# Patient Record
Sex: FEMALE | Race: WHITE | NOT HISPANIC OR LATINO | Employment: FULL TIME | URBAN - METROPOLITAN AREA
[De-identification: names, ages, dates, MRNs, and addresses within clinical notes are randomized per-mention and may not be internally consistent; named-entity substitution may affect disease eponyms.]

---

## 2017-04-12 ENCOUNTER — TELEPHONE (OUTPATIENT)
Dept: CARDIOLOGY | Facility: CLINIC | Age: 62
End: 2017-04-12

## 2017-04-12 DIAGNOSIS — R73.01 IMPAIRED FASTING GLUCOSE: Primary | ICD-10-CM

## 2017-04-12 DIAGNOSIS — E78.5 HYPERLIPIDEMIA, UNSPECIFIED HYPERLIPIDEMIA TYPE: ICD-10-CM

## 2017-04-26 ENCOUNTER — TELEPHONE (OUTPATIENT)
Dept: CARDIOLOGY | Facility: CLINIC | Age: 62
End: 2017-04-26

## 2017-04-26 NOTE — TELEPHONE ENCOUNTER
----- Message from Dianna Maynard sent at 4/26/2017 11:46 AM CDT -----  Contact: Pt  [phone] this is the wife work number  Della this pt  called and ask that you give them a call in ref to their appt.    Thanks

## 2017-05-23 ENCOUNTER — TELEPHONE (OUTPATIENT)
Dept: CARDIOLOGY | Facility: CLINIC | Age: 62
End: 2017-05-23

## 2017-05-24 NOTE — TELEPHONE ENCOUNTER
----- Message from Dianna Maynard sent at 5/23/2017  9:32 AM CDT -----  Contact: pt [phone]  Della this pt would like a call in ref to some labs that are ordered.    Thanks

## 2017-05-24 NOTE — TELEPHONE ENCOUNTER
Called patient. She has outside labs she wants to fax before her June 15th appt. She will get them from her outside pcp.

## 2017-06-06 DIAGNOSIS — Z00.00 PHYSICAL EXAM: Primary | ICD-10-CM

## 2017-06-07 ENCOUNTER — TELEPHONE (OUTPATIENT)
Dept: CARDIOLOGY | Facility: CLINIC | Age: 62
End: 2017-06-07

## 2017-06-07 NOTE — TELEPHONE ENCOUNTER
----- Message from Yaquelin Vazquez MA sent at 6/7/2017  4:32 PM CDT -----  Contact: patient called  Della please call the patient  At [phone] she need to talk to you about her surgery. Thank you.

## 2017-06-07 NOTE — TELEPHONE ENCOUNTER
"Returned pt's call and told her  reviewed the ekg's she faxed and they "looked ok ". Also confirmed her appt w/ for pre op clearance next week   ( for Thyroid surgery).   "

## 2017-06-15 PROBLEM — Z01.810 PREOP CARDIOVASCULAR EXAM: Status: ACTIVE | Noted: 2017-06-15

## 2017-08-11 ENCOUNTER — TELEPHONE (OUTPATIENT)
Dept: FAMILY MEDICINE | Facility: CLINIC | Age: 62
End: 2017-08-11

## 2017-08-11 ENCOUNTER — OFFICE VISIT (OUTPATIENT)
Dept: FAMILY MEDICINE | Facility: CLINIC | Age: 62
End: 2017-08-11
Payer: COMMERCIAL

## 2017-08-11 ENCOUNTER — APPOINTMENT (OUTPATIENT)
Dept: RADIOLOGY | Facility: HOSPITAL | Age: 62
End: 2017-08-11
Attending: FAMILY MEDICINE
Payer: COMMERCIAL

## 2017-08-11 VITALS
HEIGHT: 61 IN | TEMPERATURE: 98 F | WEIGHT: 198.44 LBS | BODY MASS INDEX: 37.47 KG/M2 | OXYGEN SATURATION: 93 % | SYSTOLIC BLOOD PRESSURE: 144 MMHG | HEART RATE: 84 BPM | RESPIRATION RATE: 20 BRPM | DIASTOLIC BLOOD PRESSURE: 82 MMHG

## 2017-08-11 DIAGNOSIS — M79.89 PAIN AND SWELLING OF LOWER LEG, UNSPECIFIED LATERALITY: ICD-10-CM

## 2017-08-11 DIAGNOSIS — I10 ESSENTIAL HYPERTENSION: ICD-10-CM

## 2017-08-11 DIAGNOSIS — E87.6 HYPOKALEMIA: Primary | ICD-10-CM

## 2017-08-11 DIAGNOSIS — M79.669 PAIN AND SWELLING OF LOWER LEG, UNSPECIFIED LATERALITY: Primary | ICD-10-CM

## 2017-08-11 DIAGNOSIS — Z98.890 HISTORY OF THYROID SURGERY: ICD-10-CM

## 2017-08-11 DIAGNOSIS — M79.669 PAIN AND SWELLING OF LOWER LEG, UNSPECIFIED LATERALITY: ICD-10-CM

## 2017-08-11 DIAGNOSIS — M79.89 PAIN AND SWELLING OF LOWER LEG, UNSPECIFIED LATERALITY: Primary | ICD-10-CM

## 2017-08-11 PROCEDURE — 3077F SYST BP >= 140 MM HG: CPT | Mod: S$GLB,,, | Performed by: FAMILY MEDICINE

## 2017-08-11 PROCEDURE — 99214 OFFICE O/P EST MOD 30 MIN: CPT | Mod: S$GLB,,, | Performed by: FAMILY MEDICINE

## 2017-08-11 PROCEDURE — 71020 XR CHEST PA AND LATERAL: CPT | Mod: TC,PN

## 2017-08-11 PROCEDURE — 71020 XR CHEST PA AND LATERAL: CPT | Mod: 26,,, | Performed by: RADIOLOGY

## 2017-08-11 PROCEDURE — 99999 PR PBB SHADOW E&M-EST. PATIENT-LVL III: CPT | Mod: PBBFAC,,, | Performed by: FAMILY MEDICINE

## 2017-08-11 PROCEDURE — 3079F DIAST BP 80-89 MM HG: CPT | Mod: S$GLB,,, | Performed by: FAMILY MEDICINE

## 2017-08-11 PROCEDURE — 3008F BODY MASS INDEX DOCD: CPT | Mod: S$GLB,,, | Performed by: FAMILY MEDICINE

## 2017-08-11 RX ORDER — ACETAMINOPHEN AND CODEINE PHOSPHATE 300; 60 MG/1; MG/1
TABLET ORAL
COMMUNITY
Start: 2017-08-01 | End: 2017-08-23

## 2017-08-11 RX ORDER — FUROSEMIDE 40 MG/1
40 TABLET ORAL EVERY MORNING
Refills: 0 | COMMUNITY
Start: 2017-08-06 | End: 2017-08-14 | Stop reason: SDUPTHER

## 2017-08-11 RX ORDER — AMOXICILLIN AND CLAVULANATE POTASSIUM 500; 125 MG/1; MG/1
1 TABLET, FILM COATED ORAL 2 TIMES DAILY
Refills: 0 | COMMUNITY
Start: 2017-08-06 | End: 2017-08-14 | Stop reason: SDUPTHER

## 2017-08-11 RX ORDER — LOSARTAN POTASSIUM 50 MG/1
50 TABLET ORAL DAILY
Refills: 1 | COMMUNITY
Start: 2017-08-10 | End: 2019-02-18

## 2017-08-11 RX ORDER — CLONAZEPAM 2 MG/1
TABLET ORAL
COMMUNITY
Start: 2017-08-01

## 2017-08-11 RX ORDER — FUROSEMIDE 20 MG/1
20 TABLET ORAL DAILY
Refills: 3 | COMMUNITY
Start: 2017-07-27 | End: 2017-08-14 | Stop reason: DRUGHIGH

## 2017-08-11 RX ORDER — FINASTERIDE 5 MG/1
TABLET, FILM COATED ORAL
COMMUNITY
Start: 2017-07-24 | End: 2017-08-23

## 2017-08-11 RX ORDER — POTASSIUM CHLORIDE 1500 MG/1
20 TABLET, EXTENDED RELEASE ORAL DAILY
Qty: 30 TABLET | Refills: 0 | Status: SHIPPED | OUTPATIENT
Start: 2017-08-11 | End: 2019-06-20

## 2017-08-11 RX ORDER — VENLAFAXINE HYDROCHLORIDE 150 MG/1
CAPSULE, EXTENDED RELEASE ORAL
COMMUNITY
Start: 2017-07-31 | End: 2019-06-20

## 2017-08-11 RX ORDER — LEVOTHYROXINE SODIUM 137 UG/1
TABLET ORAL
Refills: 5 | COMMUNITY
Start: 2017-07-11 | End: 2017-08-23
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
## 2017-08-12 PROBLEM — Z01.810 PREOP CARDIOVASCULAR EXAM: Status: RESOLVED | Noted: 2017-06-15 | Resolved: 2017-08-12

## 2017-08-12 NOTE — TELEPHONE ENCOUNTER
Called patient, discussed lab results.  +hypokalemia.  Sent potassium to her pharmacy. Likely due to Lasix usage.  Kidney function normal.  Discussed taking 80mg daily during the weekend, and going back down to 40mg on Monday.   Then come back to clinic in 7-10 days.  Her thyroid studies show TSH suppression, she had thyroid surgery and would like endocrinology consult.  That has been placed.  CXR results also discussed.  No cardiomegaly, BNP normal.  No evidence of heart stress.    Sincerely  Dr Hernandez

## 2017-08-14 ENCOUNTER — TELEPHONE (OUTPATIENT)
Dept: FAMILY MEDICINE | Facility: CLINIC | Age: 62
End: 2017-08-14

## 2017-08-14 DIAGNOSIS — L03.119 CELLULITIS OF LOWER EXTREMITY, UNSPECIFIED LATERALITY: ICD-10-CM

## 2017-08-14 DIAGNOSIS — R60.0 LOWER EXTREMITY EDEMA: Primary | ICD-10-CM

## 2017-08-14 RX ORDER — AMOXICILLIN AND CLAVULANATE POTASSIUM 500; 125 MG/1; MG/1
1 TABLET, FILM COATED ORAL 2 TIMES DAILY
Qty: 10 TABLET | Refills: 0 | Status: SHIPPED | OUTPATIENT
Start: 2017-08-14 | End: 2017-08-23

## 2017-08-14 RX ORDER — FUROSEMIDE 40 MG/1
40 TABLET ORAL EVERY MORNING
Qty: 30 TABLET | Refills: 0 | Status: SHIPPED | OUTPATIENT
Start: 2017-08-14 | End: 2017-08-23

## 2017-08-14 NOTE — TELEPHONE ENCOUNTER
----- Message from Kayley Larsen sent at 8/11/2017  4:37 PM CDT -----  Contact: self  Pt just left appt... She does not have a clear understanding of what medications she should be talking.... Please call [phone].. thanks
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
## 2017-08-14 NOTE — TELEPHONE ENCOUNTER
----- Message from Yulisa Duque sent at 8/14/2017  9:45 AM CDT -----  Contact: self  Pt is requesting a refill for furosemide (LASIX) 40 MG tablet with 80 mg & a script for antibiotics. Please advise. [phone]

## 2017-08-16 ENCOUNTER — TELEPHONE (OUTPATIENT)
Dept: FAMILY MEDICINE | Facility: CLINIC | Age: 62
End: 2017-08-16

## 2017-08-16 DIAGNOSIS — M79.604 PAIN IN BOTH LOWER EXTREMITIES: Primary | ICD-10-CM

## 2017-08-16 DIAGNOSIS — M79.605 PAIN IN BOTH LOWER EXTREMITIES: Primary | ICD-10-CM

## 2017-08-16 RX ORDER — GABAPENTIN 300 MG/1
600 CAPSULE ORAL 3 TIMES DAILY PRN
Qty: 180 CAPSULE | Refills: 1 | Status: SHIPPED | OUTPATIENT
Start: 2017-08-16 | End: 2017-08-23

## 2017-08-16 RX ORDER — KETOROLAC TROMETHAMINE 30 MG/ML
60 INJECTION, SOLUTION INTRAMUSCULAR; INTRAVENOUS
Status: COMPLETED | OUTPATIENT
Start: 2017-08-17 | End: 2017-08-17

## 2017-08-16 NOTE — TELEPHONE ENCOUNTER
----- Message from Corina Owusu sent at 8/16/2017  2:42 PM CDT -----  Contact: self  [phone]  Pt called requesting a appt, I told her that there was a 3:20 appt, pt declined and asked for a 4:30 to 5:00 appt , nothing available. . Pt said to call her [phone].......Thank..,.,,,,Leila  :

## 2017-08-16 NOTE — TELEPHONE ENCOUNTER
"Patient called and spoke to.  She says that she was "doing good" for the last 2 days.  And then today, she feels horrible "like I can't even walk."  Her legs are less swollen, but they are painful.      She's taking the potassium pills.  She continues to have tingling pains in her hands and feet.     She would like to get a pain shot, she's okay with anti-inflammatory. She'd like to come in tomorrow morning for this.     Sincerely  Dr Hernandez    "

## 2017-08-17 ENCOUNTER — TELEPHONE (OUTPATIENT)
Dept: FAMILY MEDICINE | Facility: CLINIC | Age: 62
End: 2017-08-17

## 2017-08-17 ENCOUNTER — LAB VISIT (OUTPATIENT)
Dept: LAB | Facility: HOSPITAL | Age: 62
End: 2017-08-17
Attending: INTERNAL MEDICINE
Payer: COMMERCIAL

## 2017-08-17 ENCOUNTER — CLINICAL SUPPORT (OUTPATIENT)
Dept: FAMILY MEDICINE | Facility: CLINIC | Age: 62
End: 2017-08-17
Payer: COMMERCIAL

## 2017-08-17 VITALS — TEMPERATURE: 98 F

## 2017-08-17 DIAGNOSIS — C73 THYROID CANCER: ICD-10-CM

## 2017-08-17 DIAGNOSIS — E89.0 POSTSURGICAL HYPOTHYROIDISM: Primary | ICD-10-CM

## 2017-08-17 LAB
T4 FREE SERPL-MCNC: 1.05 NG/DL
TSH SERPL DL<=0.005 MIU/L-ACNC: 0.05 UIU/ML

## 2017-08-17 PROCEDURE — 86800 THYROGLOBULIN ANTIBODY: CPT | Mod: 91

## 2017-08-17 PROCEDURE — 84432 ASSAY OF THYROGLOBULIN: CPT

## 2017-08-17 PROCEDURE — 83036 HEMOGLOBIN GLYCOSYLATED A1C: CPT

## 2017-08-17 PROCEDURE — 84443 ASSAY THYROID STIM HORMONE: CPT

## 2017-08-17 PROCEDURE — 36415 COLL VENOUS BLD VENIPUNCTURE: CPT | Mod: PN

## 2017-08-17 PROCEDURE — 99999 PR PBB SHADOW E&M-EST. PATIENT-LVL I: CPT | Mod: PBBFAC,,,

## 2017-08-17 PROCEDURE — 96372 THER/PROPH/DIAG INJ SC/IM: CPT | Mod: S$GLB,,, | Performed by: FAMILY MEDICINE

## 2017-08-17 PROCEDURE — 84439 ASSAY OF FREE THYROXINE: CPT

## 2017-08-17 RX ADMIN — KETOROLAC TROMETHAMINE 60 MG: 30 INJECTION, SOLUTION INTRAMUSCULAR; INTRAVENOUS at 09:08

## 2017-08-17 NOTE — TELEPHONE ENCOUNTER
Pt. Called c/o of diahrrea since starting the Neurontin yesturday. Per Dr. Romeo, pt. Is to stop Neurontin and start Imodium for the diahrrea.  She may be referred to specialist since patient has tried all other meds.     Message given to patient. Pt. States she has an appt next week with Dr. Romeo and will follow up at that time.

## 2017-08-17 NOTE — PROGRESS NOTES
Patient tolerate Toradol  60 mg Im injection . Patient advise to wait 15 min after injection  for assessment of any posssible side effects.

## 2017-08-17 NOTE — TELEPHONE ENCOUNTER
----- Message from Kristie Jovel sent at 8/17/2017  2:46 PM CDT -----  Contact: Self  Pt calling regarding medication. Please call [phone]

## 2017-08-18 ENCOUNTER — OFFICE VISIT (OUTPATIENT)
Dept: FAMILY MEDICINE | Facility: CLINIC | Age: 62
End: 2017-08-18
Payer: COMMERCIAL

## 2017-08-18 VITALS
WEIGHT: 196.19 LBS | HEART RATE: 74 BPM | RESPIRATION RATE: 20 BRPM | HEIGHT: 61 IN | DIASTOLIC BLOOD PRESSURE: 84 MMHG | BODY MASS INDEX: 37.04 KG/M2 | SYSTOLIC BLOOD PRESSURE: 138 MMHG | TEMPERATURE: 98 F

## 2017-08-18 DIAGNOSIS — M79.605 PAIN IN BOTH LOWER EXTREMITIES: Primary | ICD-10-CM

## 2017-08-18 DIAGNOSIS — K13.0 ANGULAR CHEILOSIS: ICD-10-CM

## 2017-08-18 DIAGNOSIS — M79.604 PAIN IN BOTH LOWER EXTREMITIES: Primary | ICD-10-CM

## 2017-08-18 LAB
ESTIMATED AVG GLUCOSE: 103 MG/DL
HBA1C MFR BLD HPLC: 5.2 %
THRYOGLOBULIN INTERPRETATION: ABNORMAL
THYROGLOB AB SERPL IA-ACNC: <4 IU/ML
THYROGLOB AB SERPL-ACNC: <1.8 IU/ML
THYROGLOB SERPL-MCNC: 0.1 NG/ML

## 2017-08-18 PROCEDURE — 99999 PR PBB SHADOW E&M-EST. PATIENT-LVL III: CPT | Mod: PBBFAC,,, | Performed by: FAMILY MEDICINE

## 2017-08-18 PROCEDURE — 99214 OFFICE O/P EST MOD 30 MIN: CPT | Mod: 25,S$GLB,, | Performed by: FAMILY MEDICINE

## 2017-08-18 PROCEDURE — 3008F BODY MASS INDEX DOCD: CPT | Mod: S$GLB,,, | Performed by: FAMILY MEDICINE

## 2017-08-18 PROCEDURE — 3079F DIAST BP 80-89 MM HG: CPT | Mod: S$GLB,,, | Performed by: FAMILY MEDICINE

## 2017-08-18 PROCEDURE — 96372 THER/PROPH/DIAG INJ SC/IM: CPT | Mod: S$GLB,,, | Performed by: FAMILY MEDICINE

## 2017-08-18 PROCEDURE — 3075F SYST BP GE 130 - 139MM HG: CPT | Mod: S$GLB,,, | Performed by: FAMILY MEDICINE

## 2017-08-18 RX ORDER — NABUMETONE 750 MG/1
750 TABLET, FILM COATED ORAL 2 TIMES DAILY
Qty: 60 TABLET | Refills: 0 | Status: SHIPPED | OUTPATIENT
Start: 2017-08-18 | End: 2017-08-23

## 2017-08-18 RX ORDER — KETOROLAC TROMETHAMINE 30 MG/ML
60 INJECTION, SOLUTION INTRAMUSCULAR; INTRAVENOUS
Status: COMPLETED | OUTPATIENT
Start: 2017-08-18 | End: 2017-08-18

## 2017-08-18 RX ORDER — HYDROCODONE BITARTRATE AND ACETAMINOPHEN 5; 325 MG/1; MG/1
TABLET ORAL
Status: ON HOLD | COMMUNITY
Start: 2017-08-11 | End: 2017-08-24 | Stop reason: HOSPADM

## 2017-08-18 RX ADMIN — KETOROLAC TROMETHAMINE 60 MG: 30 INJECTION, SOLUTION INTRAMUSCULAR; INTRAVENOUS at 02:08

## 2017-08-18 NOTE — PROGRESS NOTES
"Routine Office Visit    Patient Name: Jorge Joshua    : 1955  MRN: [id number]    Subjective:  Jorge is a 61 y.o. female who presents today for:    1. Hypothyroidism - she just got a call from her endocrinologist.  She doesn't need radiation/chemotherapy.  She is going to f/u with endocrinologist in January.      2. Anxious - forgetting things.  She is having trouble at work.  "Always been nervous."  She's worried about forgetting things.  She's scared about losing her job.  She's sleeping well.  She's tearful multiple times during interview.  "I'm falling apart.  I don't want to lose my job."  She states that what is causing her to feel this way is "the pain."  She gets out of the bed, and she has pain.   She's overwhelmed with "everything - just everything.  Taking my medications in the morning takes 15 minutes each morning."  Dr. Estrada sees - Sept 2, psychiatrist.  She states her hair is falling out "a big clump fell out yesterday."      3.  Corners of mouth are painful - there are cracking in both sides of her mouth.  Her tongue is also "burning."  She states she takes multiple medications but does not know specifically what they are.   She takes fish oil, and a couple other ones.      4.  Leg swelling - much improved. She continues to take lasix.  The erythema on the back of her leg is now resolved.  They continue to be painful. She uses her compression stockings at night and "sometimes at work."        Past Medical History  Past Medical History:   Diagnosis Date    Anxiety     Chronic low back pain     Depression     Heart murmur     Hyperlipidemia     Hypertension     Menorrhagia     OA (osteoarthritis) of knee     Obesity     Personal history of colonic polyps        Past Surgical History  Past Surgical History:   Procedure Laterality Date    breast reduction       SECTION, LOW TRANSVERSE      HYSTERECTOMY  2012    TOTAL KNEE ARTHROPLASTY      left       Family History  Family " History   Problem Relation Age of Onset    Colon cancer Father      colon    Hypertension Mother     Alzheimer's disease Father        Social History  Social History     Social History    Marital status:      Spouse name: kelly    Number of children: 2    Years of education: 12     Occupational History     [employer]     Social History Main Topics    Smoking status: Former Smoker     Types: Cigarettes     Quit date: 7/31/2012    Smokeless tobacco: Never Used    Alcohol use No    Drug use: No    Sexual activity: Yes     Partners: Male     Other Topics Concern    Not on file     Social History Narrative    No narrative on file       Current Medications  Current Outpatient Prescriptions on File Prior to Visit   Medication Sig Dispense Refill    acetaminophen-codeine 300-60mg (TYLENOL #4) 300-60 mg Tab       amoxicillin-clavulanate 500-125mg (AUGMENTIN) 500-125 mg Tab Take 1 tablet (500 mg total) by mouth 2 (two) times daily. 10 tablet 0    clonazePAM (KLONOPIN) 2 MG Tab       cyclobenzaprine (FLEXERIL) 10 MG tablet Take 1 tablet (10 mg total) by mouth nightly as needed for Muscle spasms. 30 tablet 2    duloxetine (CYMBALTA) 60 MG capsule Take 60 mg by mouth once daily.      estradiol (ESTRACE) 0.5 MG tablet   1    finasteride (PROSCAR) 5 mg tablet       furosemide (LASIX) 40 MG tablet Take 1 tablet (40 mg total) by mouth every morning. 30 tablet 0    gabapentin (NEURONTIN) 300 MG capsule Take 2 capsules (600 mg total) by mouth 3 (three) times daily as needed. 180 capsule 1    levothyroxine (SYNTHROID) 137 MCG Tab tablet TK 1 T PO QD  5    losartan (COZAAR) 50 MG tablet Take 50 mg by mouth once daily.  1    potassium chloride (K-TAB) 20 mEq Take 1 tablet (20 mEq total) by mouth once daily. 30 tablet 0    venlafaxine (EFFEXOR-XR) 150 MG Cp24       amlodipine (NORVASC) 10 MG tablet Take 1 tablet (10 mg total) by mouth once daily. 30 tablet 1     No current  "facility-administered medications on file prior to visit.        Allergies   Review of patient's allergies indicates:  No Known Allergies    Review of Systems (Pertinent positives)  Constititutional: Weight loss, excess fatigue, chills, fever, night sweats, weakness, loss of appetite  Skin: Rash, itching, lesions, color changes  Ears: Earache, ringing in ears, discharge, hearing loss, hearing aid, popping, infection Nose: stuffy nose, mouth breathing, post-nasal drip,   Lungs: Cough, sputum, cough up blood, wheeze, frequent URI, SOA, Asthma  Heart: Chest pain, angina, palpitations, extra heart beats  Stomach/Intestine: Heartburn, Nausea, vomiting, diarrhea, indigestion, bloating, constipation, change in bowel movements  Bones/Muscles/Joints: Joint pain, deformities, back pain, swelling, stiffness  Brain: Numbness, tingling, tremor, fainting, headaches, muscle weakness, frequent falls    /84 (BP Location: Left arm, Patient Position: Sitting, BP Method: Medium (Manual))   Pulse 74   Temp 98.3 °F (36.8 °C) (Oral)   Resp 20   Ht 5' 1" (1.549 m)   Wt 89 kg (196 lb 3.4 oz)   BMI 37.07 kg/m²     GENERAL APPEARANCE: in no apparent distress and well developed and well nourished  HEENT: PERRLA, EOMI, Sclera clear, anicteric, Oropharynx clear, no lesions, Neck supple with midline trachea  RESPIRATORY: appears well, vitals normal, no respiratory distress, acyanotic, normal RR, chest clear, no wheezing, crepitations, rhonchi, normal symmetric air entry  HEART: regular rate and rhythm, S1, S2 normal, no murmur, click, rub or gallop.    NEUROLOGIC: normal without focal findings, CN II-XII are intact.    Extremities: warm/well perfused.  No abnormal hair patterns.  No clubbing, cyanosis. +slight edema in lower legs b/l.   SKIN: no rashes, no wounds, no other lesions. +corners of mouth with cracking, redness.  +tongue appears dry, inflamed.  PSYCH: Alert, oriented x 3, thought content appropriate, speech normal, pleasant " and cooperative, good eye contact, well groomed, recall good, concentration/judgement good and apparently average intelligence.    Assessment/Plan:  Jorge Joshua is a 61 y.o. female who presents today for :    Jorge was seen today for mouth lesions.    Diagnoses and all orders for this visit:    Pain in both lower extremities  -     ketorolac injection 60 mg; Inject 2 mLs (60 mg total) into the muscle one time.  -     nabumetone (RELAFEN) 750 MG tablet; Take 1 tablet (750 mg total) by mouth 2 (two) times daily.    Angular cheilosis     - may be due to vitamin B deficiency, told to put vitamin B liquid and oral tablets.      -  Consider yeast infection due to recent antibiotics.  Can try treatment for oral candidiasis.    Anxiety  -   Follow up with Dr. Estrada. Has tried Abilify with side effects.  Unsure of her complete psych history and meds.  Recommended f/u with specialist.    F/u 2 weeks - angular cheilosis.

## 2017-08-18 NOTE — PATIENT INSTRUCTIONS
Vitamin B Complex Liquid Drops for Re-newed Energy, Vitality, and Immunity by [employer]  Average ratinout [id number], based [id number] reviewsratings   Q&A   By: [employer]   share on [employer] on [employer] on [employer]         This button opens a dialog that displays additional images for this product with the option to zoom in or out.   Highlights   Finally, An EASY Way to Take Your Vitamins - so many people do not take vitamins and minerals because swallowing pills can be difficult and uncomfortable. With [employer] Vitamin B Drops you can get your daily Vitamin B supplements with just a dropper full of LIQUID Vitamin B on your tongue. Read more....   Tell us if something is incorrect   $14.44

## 2017-08-23 ENCOUNTER — TELEPHONE (OUTPATIENT)
Dept: FAMILY MEDICINE | Facility: CLINIC | Age: 62
End: 2017-08-23

## 2017-08-23 ENCOUNTER — HOSPITAL ENCOUNTER (OUTPATIENT)
Facility: HOSPITAL | Age: 62
Discharge: HOME | End: 2017-08-24
Admitting: HOSPITALIST
Payer: COMMERCIAL

## 2017-08-23 DIAGNOSIS — R07.9 CHEST PAIN, UNSPECIFIED TYPE: Primary | ICD-10-CM

## 2017-08-23 DIAGNOSIS — F33.9 EPISODE OF RECURRENT MAJOR DEPRESSIVE DISORDER, UNSPECIFIED DEPRESSION EPISODE SEVERITY: ICD-10-CM

## 2017-08-23 DIAGNOSIS — I10 ESSENTIAL (PRIMARY) HYPERTENSION: ICD-10-CM

## 2017-08-23 DIAGNOSIS — E89.0 S/P THYROIDECTOMY: ICD-10-CM

## 2017-08-23 LAB
ALBUMIN SERPL BCP-MCNC: 3.4 G/DL
ALP SERPL-CCNC: 85 U/L
ALT SERPL W/O P-5'-P-CCNC: 18 U/L
ANION GAP SERPL CALC-SCNC: 6 MMOL/L
AORTIC VALVE STENOSIS: ABNORMAL
AST SERPL-CCNC: 16 U/L
BACTERIA #/AREA URNS HPF: NORMAL /HPF
BASOPHILS # BLD AUTO: 0.02 K/UL
BASOPHILS NFR BLD: 0.3 %
BILIRUB SERPL-MCNC: 0.6 MG/DL
BILIRUB UR QL STRIP: NEGATIVE
BNP SERPL-MCNC: 124 PG/ML
BUN SERPL-MCNC: 14 MG/DL
CALCIUM SERPL-MCNC: 9.4 MG/DL
CHLORIDE SERPL-SCNC: 106 MMOL/L
CK MB SERPL-MCNC: 0.8 NG/ML
CK MB SERPL-RTO: 2.1 %
CK SERPL-CCNC: 38 U/L
CLARITY UR: CLEAR
CO2 SERPL-SCNC: 31 MMOL/L
COLOR UR: YELLOW
CREAT SERPL-MCNC: 0.7 MG/DL
DIASTOLIC DYSFUNCTION: YES
DIFFERENTIAL METHOD: ABNORMAL
EOSINOPHIL # BLD AUTO: 0.1 K/UL
EOSINOPHIL NFR BLD: 0.6 %
ERYTHROCYTE [DISTWIDTH] IN BLOOD BY AUTOMATED COUNT: 12.7 %
EST. GFR  (AFRICAN AMERICAN): >60 ML/MIN/1.73 M^2
EST. GFR  (NON AFRICAN AMERICAN): >60 ML/MIN/1.73 M^2
ESTIMATED PA SYSTOLIC PRESSURE: 37.11
GLOBAL PERICARDIAL EFFUSION: ABNORMAL
GLUCOSE SERPL-MCNC: 120 MG/DL
GLUCOSE UR QL STRIP: NEGATIVE
HCT VFR BLD AUTO: 37.8 %
HGB BLD-MCNC: 12.4 G/DL
HGB UR QL STRIP: NEGATIVE
INR PPP: 1
KETONES UR QL STRIP: NEGATIVE
LEUKOCYTE ESTERASE UR QL STRIP: NEGATIVE
LYMPHOCYTES # BLD AUTO: 1.7 K/UL
LYMPHOCYTES NFR BLD: 21.4 %
MCH RBC QN AUTO: 29 PG
MCHC RBC AUTO-ENTMCNC: 32.8 G/DL
MCV RBC AUTO: 89 FL
MICROSCOPIC COMMENT: NORMAL
MONOCYTES # BLD AUTO: 0.6 K/UL
MONOCYTES NFR BLD: 7.1 %
NEUTROPHILS # BLD AUTO: 5.6 K/UL
NEUTROPHILS NFR BLD: 70.6 %
NITRITE UR QL STRIP: NEGATIVE
PH UR STRIP: 6 [PH] (ref 5–8)
PLATELET # BLD AUTO: 386 K/UL
PMV BLD AUTO: 9.2 FL
POTASSIUM SERPL-SCNC: 4.2 MMOL/L
PROT SERPL-MCNC: 6.6 G/DL
PROT UR QL STRIP: NEGATIVE
PROTHROMBIN TIME: 10.1 SEC
RBC # BLD AUTO: 4.27 M/UL
RETIRED EF AND QEF - SEE NOTES: 55 (ref 55–65)
SODIUM SERPL-SCNC: 143 MMOL/L
SP GR UR STRIP: 1.01 (ref 1–1.03)
T4 FREE SERPL-MCNC: 1.4 NG/DL
TRICUSPID VALVE REGURGITATION: ABNORMAL
TROPONIN I SERPL DL<=0.01 NG/ML-MCNC: <0.006 NG/ML
TSH SERPL DL<=0.005 MIU/L-ACNC: 0.06 UIU/ML
URN SPEC COLLECT METH UR: NORMAL
UROBILINOGEN UR STRIP-ACNC: NEGATIVE EU/DL
WBC # BLD AUTO: 7.86 K/UL
WBC #/AREA URNS HPF: 2 /HPF (ref 0–5)

## 2017-08-23 PROCEDURE — 93306 TTE W/DOPPLER COMPLETE: CPT | Mod: 26,,, | Performed by: INTERNAL MEDICINE

## 2017-08-23 PROCEDURE — 85610 PROTHROMBIN TIME: CPT

## 2017-08-23 PROCEDURE — 93010 ELECTROCARDIOGRAM REPORT: CPT | Mod: ,,, | Performed by: INTERNAL MEDICINE

## 2017-08-23 PROCEDURE — 96375 TX/PRO/DX INJ NEW DRUG ADDON: CPT

## 2017-08-23 PROCEDURE — 25000003 PHARM REV CODE 250: Performed by: NURSE PRACTITIONER

## 2017-08-23 PROCEDURE — 84484 ASSAY OF TROPONIN QUANT: CPT | Mod: 91

## 2017-08-23 PROCEDURE — 63600175 PHARM REV CODE 636 W HCPCS

## 2017-08-23 PROCEDURE — 80053 COMPREHEN METABOLIC PANEL: CPT

## 2017-08-23 PROCEDURE — 93306 TTE W/DOPPLER COMPLETE: CPT

## 2017-08-23 PROCEDURE — 84439 ASSAY OF FREE THYROXINE: CPT

## 2017-08-23 PROCEDURE — 83880 ASSAY OF NATRIURETIC PEPTIDE: CPT

## 2017-08-23 PROCEDURE — 99244 OFF/OP CNSLTJ NEW/EST MOD 40: CPT | Mod: ,,, | Performed by: INTERNAL MEDICINE

## 2017-08-23 PROCEDURE — G0378 HOSPITAL OBSERVATION PER HR: HCPCS

## 2017-08-23 PROCEDURE — 81000 URINALYSIS NONAUTO W/SCOPE: CPT

## 2017-08-23 PROCEDURE — 82553 CREATINE MB FRACTION: CPT

## 2017-08-23 PROCEDURE — 85025 COMPLETE CBC W/AUTO DIFF WBC: CPT

## 2017-08-23 PROCEDURE — 96374 THER/PROPH/DIAG INJ IV PUSH: CPT

## 2017-08-23 PROCEDURE — 99285 EMERGENCY DEPT VISIT HI MDM: CPT

## 2017-08-23 PROCEDURE — 93005 ELECTROCARDIOGRAM TRACING: CPT

## 2017-08-23 PROCEDURE — 25000003 PHARM REV CODE 250

## 2017-08-23 PROCEDURE — 36415 COLL VENOUS BLD VENIPUNCTURE: CPT

## 2017-08-23 PROCEDURE — 84443 ASSAY THYROID STIM HORMONE: CPT

## 2017-08-23 RX ORDER — LEVOTHYROXINE SODIUM 125 UG/1
125 TABLET ORAL
Status: DISCONTINUED | OUTPATIENT
Start: 2017-08-24 | End: 2017-08-24 | Stop reason: HOSPADM

## 2017-08-23 RX ORDER — POTASSIUM CHLORIDE 20 MEQ/1
20 TABLET, EXTENDED RELEASE ORAL DAILY
Status: DISCONTINUED | OUTPATIENT
Start: 2017-08-23 | End: 2017-08-24

## 2017-08-23 RX ORDER — ASPIRIN 325 MG
325 TABLET, DELAYED RELEASE (ENTERIC COATED) ORAL DAILY
Status: DISCONTINUED | OUTPATIENT
Start: 2017-08-24 | End: 2017-08-24 | Stop reason: HOSPADM

## 2017-08-23 RX ORDER — NITROGLYCERIN 0.4 MG/1
0.4 TABLET SUBLINGUAL EVERY 5 MIN PRN
Status: DISCONTINUED | OUTPATIENT
Start: 2017-08-23 | End: 2017-08-24 | Stop reason: HOSPADM

## 2017-08-23 RX ORDER — NITROGLYCERIN 0.4 MG/1
0.4 TABLET SUBLINGUAL
Status: ACTIVE | OUTPATIENT
Start: 2017-08-23 | End: 2017-08-24

## 2017-08-23 RX ORDER — ACETAMINOPHEN AND CODEINE PHOSPHATE 300; 60 MG/1; MG/1
1 TABLET ORAL EVERY 12 HOURS PRN
Status: DISCONTINUED | OUTPATIENT
Start: 2017-08-23 | End: 2017-08-24

## 2017-08-23 RX ORDER — MORPHINE SULFATE 10 MG/ML
5 INJECTION INTRAMUSCULAR; INTRAVENOUS; SUBCUTANEOUS
Status: COMPLETED | OUTPATIENT
Start: 2017-08-23 | End: 2017-08-23

## 2017-08-23 RX ORDER — NAPROXEN SODIUM 220 MG/1
162 TABLET, FILM COATED ORAL
Status: COMPLETED | OUTPATIENT
Start: 2017-08-23 | End: 2017-08-23

## 2017-08-23 RX ORDER — ONDANSETRON 2 MG/ML
4 INJECTION INTRAMUSCULAR; INTRAVENOUS
Status: COMPLETED | OUTPATIENT
Start: 2017-08-23 | End: 2017-08-23

## 2017-08-23 RX ORDER — LEVOTHYROXINE SODIUM 125 UG/1
125 TABLET ORAL DAILY
COMMUNITY
End: 2017-09-11 | Stop reason: SDUPTHER

## 2017-08-23 RX ORDER — CLONAZEPAM 0.5 MG/1
1 TABLET ORAL 2 TIMES DAILY PRN
Status: DISCONTINUED | OUTPATIENT
Start: 2017-08-23 | End: 2017-08-24

## 2017-08-23 RX ORDER — MORPHINE SULFATE 10 MG/ML
5 INJECTION INTRAMUSCULAR; INTRAVENOUS; SUBCUTANEOUS EVERY 6 HOURS PRN
Status: DISCONTINUED | OUTPATIENT
Start: 2017-08-23 | End: 2017-08-23

## 2017-08-23 RX ORDER — VENLAFAXINE HYDROCHLORIDE 37.5 MG/1
150 CAPSULE, EXTENDED RELEASE ORAL DAILY
Status: DISCONTINUED | OUTPATIENT
Start: 2017-08-23 | End: 2017-08-24 | Stop reason: HOSPADM

## 2017-08-23 RX ORDER — LOSARTAN POTASSIUM 25 MG/1
50 TABLET ORAL DAILY
Status: DISCONTINUED | OUTPATIENT
Start: 2017-08-23 | End: 2017-08-24 | Stop reason: HOSPADM

## 2017-08-23 RX ADMIN — ASPIRIN 81 MG 162 MG: 81 TABLET ORAL at 10:08

## 2017-08-23 RX ADMIN — MORPHINE SULFATE 5 MG: 10 INJECTION INTRAVENOUS at 03:08

## 2017-08-23 RX ADMIN — NITROGLYCERIN 0.5 INCH: 20 OINTMENT TOPICAL at 05:08

## 2017-08-23 RX ADMIN — ACETAMINOPHEN AND CODEINE PHOSPHATE 1 TABLET: 300; 60 TABLET ORAL at 07:08

## 2017-08-23 RX ADMIN — NITROGLYCERIN 0.4 MG: 0.4 TABLET SUBLINGUAL at 11:08

## 2017-08-23 RX ADMIN — LOSARTAN POTASSIUM 50 MG: 25 TABLET, FILM COATED ORAL at 02:08

## 2017-08-23 RX ADMIN — CLONAZEPAM 1 MG: 0.5 TABLET ORAL at 11:08

## 2017-08-23 RX ADMIN — NITROGLYCERIN 0.5 INCH: 20 OINTMENT TOPICAL at 10:08

## 2017-08-23 RX ADMIN — ONDANSETRON 4 MG: 2 INJECTION INTRAMUSCULAR; INTRAVENOUS at 01:08

## 2017-08-23 RX ADMIN — VENLAFAXINE HYDROCHLORIDE 150 MG: 37.5 CAPSULE, EXTENDED RELEASE ORAL at 02:08

## 2017-08-23 RX ADMIN — NITROGLYCERIN 0.5 INCH: 20 OINTMENT TOPICAL at 11:08

## 2017-08-23 RX ADMIN — MORPHINE SULFATE 5 MG: 10 INJECTION INTRAVENOUS at 01:08

## 2017-08-23 RX ADMIN — POTASSIUM CHLORIDE 20 MEQ: 1500 TABLET, EXTENDED RELEASE ORAL at 02:08

## 2017-08-23 NOTE — TELEPHONE ENCOUNTER
----- Message from Taylor Gomez LPN sent at 8/23/2017  9:14 AM CDT -----  Contact: self      ----- Message -----  From: Emma Brownlee  Sent: 8/22/2017   1:33 PM  To: Mary GIRON Staff    Patient need to talk with doctor regarding the endocrinologist. Patient can be reached at [phone].    thanks

## 2017-08-23 NOTE — SUBJECTIVE & OBJECTIVE
Past Medical History:   Diagnosis Date    Anxiety     Cancer     thyroid    Chronic low back pain     Depression     Heart murmur     Hyperlipidemia     Hypertension     Menorrhagia     OA (osteoarthritis) of knee     Obesity     Personal history of colonic polyps        Past Surgical History:   Procedure Laterality Date    breast reduction       SECTION, LOW TRANSVERSE      HYSTERECTOMY  2012    TOTAL KNEE ARTHROPLASTY      left       Review of patient's allergies indicates:  No Known Allergies    No current facility-administered medications on file prior to encounter.      Current Outpatient Prescriptions on File Prior to Encounter   Medication Sig    clonazePAM (KLONOPIN) 2 MG Tab     hydrocodone-acetaminophen 5-325mg (NORCO) 5-325 mg per tablet     losartan (COZAAR) 50 MG tablet Take 50 mg by mouth once daily.    potassium chloride (K-TAB) 20 mEq Take 1 tablet (20 mEq total) by mouth once daily.    venlafaxine (EFFEXOR-XR) 150 MG Cp24     [DISCONTINUED] acetaminophen-codeine 300-60mg (TYLENOL #4) 300-60 mg Tab     [DISCONTINUED] amlodipine (NORVASC) 10 MG tablet Take 1 tablet (10 mg total) by mouth once daily.    [DISCONTINUED] amoxicillin-clavulanate 500-125mg (AUGMENTIN) 500-125 mg Tab Take 1 tablet (500 mg total) by mouth 2 (two) times daily.    [DISCONTINUED] cyclobenzaprine (FLEXERIL) 10 MG tablet Take 1 tablet (10 mg total) by mouth nightly as needed for Muscle spasms.    [DISCONTINUED] duloxetine (CYMBALTA) 60 MG capsule Take 60 mg by mouth once daily.    [DISCONTINUED] estradiol (ESTRACE) 0.5 MG tablet     [DISCONTINUED] finasteride (PROSCAR) 5 mg tablet     [DISCONTINUED] furosemide (LASIX) 40 MG tablet Take 1 tablet (40 mg total) by mouth every morning.    [DISCONTINUED] gabapentin (NEURONTIN) 300 MG capsule Take 2 capsules (600 mg total) by mouth 3 (three) times daily as needed.    [DISCONTINUED] levothyroxine (SYNTHROID) 137 MCG Tab tablet TK 1 T PO QD     [DISCONTINUED] nabumetone (RELAFEN) 750 MG tablet Take 1 tablet (750 mg total) by mouth 2 (two) times daily.     Family History     Problem Relation (Age of Onset)    Alzheimer's disease Father    Colon cancer Father    Hypertension Mother        Social History Main Topics    Smoking status: Former Smoker     Types: Cigarettes     Quit date: 7/31/2012    Smokeless tobacco: Never Used    Alcohol use No    Drug use: No    Sexual activity: Yes     Partners: Male     Review of Systems   Constitution: Negative.   HENT: Negative.    Eyes: Negative.    Cardiovascular: Positive for chest pain. Negative for dyspnea on exertion, irregular heartbeat, leg swelling, near-syncope, orthopnea, palpitations, paroxysmal nocturnal dyspnea and syncope.   Respiratory: Negative for shortness of breath.    Skin: Negative.    Musculoskeletal: Negative.    Gastrointestinal: Negative for abdominal pain, constipation and diarrhea.   Genitourinary: Negative for dysuria.   Neurological: Positive for dizziness, focal weakness and light-headedness.   Psychiatric/Behavioral: Negative.      Objective:     Vital Signs (Most Recent):  Temp: 97.8 °F (36.6 °C) (08/23/17 1237)  Pulse: (!) 46 (08/23/17 1400)  Resp: 17 (08/23/17 1328)  BP: (!) 184/78 (08/23/17 1328)  SpO2: 100 % (08/23/17 1328) Vital Signs (24h Range):  Temp:  [97.8 °F (36.6 °C)-98.1 °F (36.7 °C)] 97.8 °F (36.6 °C)  Pulse:  [42-50] 46  Resp:  [17-20] 17  SpO2:  [96 %-100 %] 100 %  BP: (119-186)/(59-79) 184/78     Weight: 79.8 kg (176 lb)  Body mass index is 33.25 kg/m².    SpO2: 100 %  O2 Device (Oxygen Therapy): room air    No intake or output data in the 24 hours ending 08/23/17 1818    Lines/Drains/Airways     Peripheral Intravenous Line                 Peripheral IV - Single Lumen 08/23/17 1023 Left Hand less than 1 day                Physical Exam   Constitutional: She is oriented to person, place, and time. She appears well-developed and well-nourished.   HENT:   Head:  Normocephalic and atraumatic.   Eyes: Conjunctivae and EOM are normal. Pupils are equal, round, and reactive to light.   Neck: Normal range of motion. Neck supple. No thyromegaly present.   Cardiovascular: Normal rate and regular rhythm.    Murmur heard.   Harsh midsystolic murmur is present with a grade of 2/6  at the upper right sternal border radiating to the neck  Pulmonary/Chest: Effort normal and breath sounds normal. No respiratory distress.   Abdominal: Soft. Bowel sounds are normal.   Musculoskeletal: She exhibits no edema.   Neurological: She is alert and oriented to person, place, and time.   Skin: Skin is warm and dry.   Psychiatric: She has a normal mood and affect. Her behavior is normal.       Significant Labs:   CMP   Recent Labs  Lab 08/23/17  1120      K 4.2      CO2 31*   *   BUN 14   CREATININE 0.7   CALCIUM 9.4   PROT 6.6   ALBUMIN 3.4*   BILITOT 0.6   ALKPHOS 85   AST 16   ALT 18   ANIONGAP 6*   ESTGFRAFRICA >60   EGFRNONAA >60   , CBC   Recent Labs  Lab 08/23/17  1120   WBC 7.86   HGB 12.4   HCT 37.8   *   , INR   Recent Labs  Lab 08/23/17  1120   INR 1.0   , Lipid Panel No results for input(s): CHOL, HDL, LDLCALC, TRIG, CHOLHDL in the last 48 hours. and Troponin   Recent Labs  Lab 08/23/17  1120 08/23/17  1340   TROPONINI <0.006 <0.006       Significant Imaging: Echocardiogram:   2D echo with color flow doppler:   Results for orders placed or performed during the hospital encounter of 08/23/17   2D echo with color flow doppler   Result Value Ref Range    EF 55 55 - 65    Diastolic Dysfunction Yes (A)     Aortic Valve Stenosis TRIVIAL     Est. PA Systolic Pressure 37.11     Pericardial Effusion NONE     Tricuspid Valve Regurgitation MILD

## 2017-08-23 NOTE — CONSULTS
[facility]  Cardiology  Consult Note    Patient Name: Jorge Joshua  MRN: [id number]  Admission Date: 8/23/2017  Hospital Length of Stay: 0 days  Code Status: Full Code   Attending Provider: Kenton Muñiz MD   Consulting Provider: Víctor Camejo MD  Primary Care Physician: Adelaida Hernandez MD  Principal Problem:<principal problem not specified>    Patient information was obtained from patient, past medical records and ER records.     Inpatient consult to Cardiology  Consult performed by: VÍCTOR CAMEJO  Consult ordered by: MARTA COOK  Reason for consult: Chest pain        Subjective:     Chief Complaint:  Chest pain     HPI:   61 y.o. female with a past medical history of Anxiety; Chronic low back pain; Depression; Heart murmur; Hyperlipidemia; Hypertension; Menorrhagia; OA of knee; Obesity; and Personal history of colonic polyps, presents to the ED via EMS complaining of a mid sternal chest pain, described as tightness that radiates to her bilateral jaws and left shoulder since this morning at 7 AM. She reports associated nausea, mild SOB along with her CP episode. No exacerbating or alleviating factors are noted. She denies any family hx of heart disease. She states pain was 8/10 but now has subsided to 5/10. Patient was not given any medication en route by EMS and she did not take her aspirin this morning. She denies fever, chills, arm pain, numbness/weakness, dizziness or any other associated sx.     Previously followed by Dr. Morrell.  She's not followed regularly but had a remote workup prior to surgery several years ago which mainly including echocardiogram and EKG.  She had recent thyroid surgery and was cleared again by her cardiologist at the main campus of E[facility].  She mainly is felt a little erratic since the surgery.  She says she's mainly felt a little stressed when back at work.  She developed some substernal chest pressure which radiated into her jaw.  This also  radiated into her back.  She felt lightheaded and was brought to the emergency room for evaluation.  She is accompanied by her  at bedside who says she's been acting hormonal since the surgery.  She was admitted to rule out any acute coronary syndrome.    Past Medical History:   Diagnosis Date    Anxiety     Cancer     thyroid    Chronic low back pain     Depression     Heart murmur     Hyperlipidemia     Hypertension     Menorrhagia     OA (osteoarthritis) of knee     Obesity     Personal history of colonic polyps        Past Surgical History:   Procedure Laterality Date    breast reduction       SECTION, LOW TRANSVERSE      HYSTERECTOMY      TOTAL KNEE ARTHROPLASTY      left       Review of patient's allergies indicates:  No Known Allergies    No current facility-administered medications on file prior to encounter.      Current Outpatient Prescriptions on File Prior to Encounter   Medication Sig    clonazePAM (KLONOPIN) 2 MG Tab     hydrocodone-acetaminophen 5-325mg (NORCO) 5-325 mg per tablet     losartan (COZAAR) 50 MG tablet Take 50 mg by mouth once daily.    potassium chloride (K-TAB) 20 mEq Take 1 tablet (20 mEq total) by mouth once daily.    venlafaxine (EFFEXOR-XR) 150 MG Cp24     [DISCONTINUED] acetaminophen-codeine 300-60mg (TYLENOL #4) 300-60 mg Tab     [DISCONTINUED] amlodipine (NORVASC) 10 MG tablet Take 1 tablet (10 mg total) by mouth once daily.    [DISCONTINUED] amoxicillin-clavulanate 500-125mg (AUGMENTIN) 500-125 mg Tab Take 1 tablet (500 mg total) by mouth 2 (two) times daily.    [DISCONTINUED] cyclobenzaprine (FLEXERIL) 10 MG tablet Take 1 tablet (10 mg total) by mouth nightly as needed for Muscle spasms.    [DISCONTINUED] duloxetine (CYMBALTA) 60 MG capsule Take 60 mg by mouth once daily.    [DISCONTINUED] estradiol (ESTRACE) 0.5 MG tablet     [DISCONTINUED] finasteride (PROSCAR) 5 mg tablet     [DISCONTINUED] furosemide (LASIX) 40 MG tablet  Take 1 tablet (40 mg total) by mouth every morning.    [DISCONTINUED] gabapentin (NEURONTIN) 300 MG capsule Take 2 capsules (600 mg total) by mouth 3 (three) times daily as needed.    [DISCONTINUED] levothyroxine (SYNTHROID) 137 MCG Tab tablet TK 1 T PO QD    [DISCONTINUED] nabumetone (RELAFEN) 750 MG tablet Take 1 tablet (750 mg total) by mouth 2 (two) times daily.     Family History     Problem Relation (Age of Onset)    Alzheimer's disease Father    Colon cancer Father    Hypertension Mother        Social History Main Topics    Smoking status: Former Smoker     Types: Cigarettes     Quit date: 7/31/2012    Smokeless tobacco: Never Used    Alcohol use No    Drug use: No    Sexual activity: Yes     Partners: Male     Review of Systems   Constitution: Negative.   HENT: Negative.    Eyes: Negative.    Cardiovascular: Positive for chest pain. Negative for dyspnea on exertion, irregular heartbeat, leg swelling, near-syncope, orthopnea, palpitations, paroxysmal nocturnal dyspnea and syncope.   Respiratory: Negative for shortness of breath.    Skin: Negative.    Musculoskeletal: Negative.    Gastrointestinal: Negative for abdominal pain, constipation and diarrhea.   Genitourinary: Negative for dysuria.   Neurological: Positive for dizziness, focal weakness and light-headedness.   Psychiatric/Behavioral: Negative.      Objective:     Vital Signs (Most Recent):  Temp: 97.8 °F (36.6 °C) (08/23/17 1237)  Pulse: (!) 46 (08/23/17 1400)  Resp: 17 (08/23/17 1328)  BP: (!) 184/78 (08/23/17 1328)  SpO2: 100 % (08/23/17 1328) Vital Signs (24h Range):  Temp:  [97.8 °F (36.6 °C)-98.1 °F (36.7 °C)] 97.8 °F (36.6 °C)  Pulse:  [42-50] 46  Resp:  [17-20] 17  SpO2:  [96 %-100 %] 100 %  BP: (119-186)/(59-79) 184/78     Weight: 79.8 kg (176 lb)  Body mass index is 33.25 kg/m².    SpO2: 100 %  O2 Device (Oxygen Therapy): room air    No intake or output data in the 24 hours ending 08/23/17 1818    Lines/Drains/Airways     Peripheral  Intravenous Line                 Peripheral IV - Single Lumen 08/23/17 1023 Left Hand less than 1 day                Physical Exam   Constitutional: She is oriented to person, place, and time. She appears well-developed and well-nourished.   HENT:   Head: Normocephalic and atraumatic.   Eyes: Conjunctivae and EOM are normal. Pupils are equal, round, and reactive to light.   Neck: Normal range of motion. Neck supple. No thyromegaly present.   Cardiovascular: Normal rate and regular rhythm.    Murmur heard.   Harsh midsystolic murmur is present with a grade of 2/6  at the upper right sternal border radiating to the neck  Pulmonary/Chest: Effort normal and breath sounds normal. No respiratory distress.   Abdominal: Soft. Bowel sounds are normal.   Musculoskeletal: She exhibits no edema.   Neurological: She is alert and oriented to person, place, and time.   Skin: Skin is warm and dry.   Psychiatric: She has a normal mood and affect. Her behavior is normal.       Significant Labs:   CMP   Recent Labs  Lab 08/23/17  1120      K 4.2      CO2 31*   *   BUN 14   CREATININE 0.7   CALCIUM 9.4   PROT 6.6   ALBUMIN 3.4*   BILITOT 0.6   ALKPHOS 85   AST 16   ALT 18   ANIONGAP 6*   ESTGFRAFRICA >60   EGFRNONAA >60   , CBC   Recent Labs  Lab 08/23/17  1120   WBC 7.86   HGB 12.4   HCT 37.8   *   , INR   Recent Labs  Lab 08/23/17  1120   INR 1.0   , Lipid Panel No results for input(s): CHOL, HDL, LDLCALC, TRIG, CHOLHDL in the last 48 hours. and Troponin   Recent Labs  Lab 08/23/17  1120 08/23/17  1340   TROPONINI <0.006 <0.006       Significant Imaging: Echocardiogram:   2D echo with color flow doppler:   Results for orders placed or performed during the hospital encounter of 08/23/17   2D echo with color flow doppler   Result Value Ref Range    EF 55 55 - 65    Diastolic Dysfunction Yes (A)     Aortic Valve Stenosis TRIVIAL     Est. PA Systolic Pressure 37.11     Pericardial Effusion NONE     Tricuspid  Valve Regurgitation MILD      Assessment and Plan:     Chest pain    Rule out for ACS  EF preserved by echo  Plan for stress test in a.m.        Hypertension    Titrate medicines as needed        Hyperlipidemia    Previously on statin        Anxiety             Obesity (BMI 30-39.9)    Encouraged diet and exercise tolerated            VTE Risk Mitigation     None          Thank you for your consult. I will follow-up with patient. Please contact us if you have any additional questions.    Víctor Limon MD  Cardiology   [facility]

## 2017-08-23 NOTE — HPI
61 y.o. female with a past medical history of Anxiety; Chronic low back pain; Depression; Heart murmur; Hyperlipidemia; Hypertension; Menorrhagia; OA of knee; Obesity; and Personal history of colonic polyps, presents to the ED via EMS complaining of a mid sternal chest pain, described as tightness that radiates to her bilateral jaws and left shoulder since this morning at 7 AM. She reports associated nausea, mild SOB along with her CP episode. No exacerbating or alleviating factors are noted. She denies any family hx of heart disease. She states pain was 8/10 but now has subsided to 5/10. Patient was not given any medication en route by EMS and she did not take her aspirin this morning. She denies fever, chills, arm pain, numbness/weakness, dizziness or any other associated sx.     Previously followed by Dr. Morrell.  She's not followed regularly but had a remote workup prior to surgery several years ago which mainly including echocardiogram and EKG.  She had recent thyroid surgery and was cleared again by her cardiologist at the main campus of EKG.  She mainly is felt a little erratic since the surgery.  She says she's mainly felt a little stressed when back at work.  She developed some substernal chest pressure which radiated into her jaw.  This also radiated into her back.  She felt lightheaded and was brought to the emergency room for evaluation.  She is accompanied by her  at bedside who says she's been acting hormonal since the surgery.  She was admitted to rule out any acute coronary syndrome.

## 2017-08-23 NOTE — PLAN OF CARE
Problem: Pain, Acute (Adult)  Goal: Identify Related Risk Factors and Signs and Symptoms  Related risk factors and signs and symptoms are identified upon initiation of Human Response Clinical Practice Guideline (CPG)  Outcome: Ongoing (interventions implemented as appropriate)   08/23/17 1433   Pain, Acute   Related Risk Factors (Acute Pain) patient perception;fear;other (see comments);disease process   Signs and Symptoms (Acute Pain) verbalization of pain descriptors;questions meaning of pain;pacing/restlessness;fear of reinjury     PAtient admitted to Obs with chest pain extended to jaw and back. Awaiting echocardiogram. Troponin currently negative. Continue to monitor/

## 2017-08-23 NOTE — ED TRIAGE NOTES
Pt arrived via [facility] from work. Pt reports intermittent jaw pain early this morning. Pt reports chest pain started around 0930. Pt report cold sweats. Co worker called EMS. Pt reports SOB and nausea.

## 2017-08-23 NOTE — PROVIDER PROGRESS NOTES - EMERGENCY DEPT.
Encounter Date: 8/23/2017    ED Physician Progress Notes        Physician Note:   Silvia Bauer NP obs     patient had a normal nuclear medicine perfusion scan in 2012.  She states prior to her thyroid surgery she was given clearance by cardiologist with the inferior lateral ischemic changes.      Patient states the pain occasionally radiates between her shoulder blades still in her right jaw and centralized chest pain, comes and goes.  She remains bradycardic.  I'm concerned she may be showing signs of inferior wall ischemia versus early infarct.  She is having headaches no nitrates we'll give her 5 morphine    Silvia

## 2017-08-23 NOTE — ED NOTES
Pt states SL nitro somewhat relieved chest pain but did not help her jaw pain. Did not want any more nitro due to headache.

## 2017-08-23 NOTE — ED PROVIDER NOTES
Encounter Date: 2017    SCRIBE #1 NOTE: I, Zaina Aponte, am scribing for, and in the presence of,  Brown Lynn MD. I have scribed the following portions of the note - Other sections scribed: ROS and HPI.       History     Chief Complaint   Patient presents with    Chest Pain     intermittent midsternal chest pain/bilat jaw pain/diaphoresis since 7am      CC: Chest Pain  HPI: This 61 y.o. female with a past medical history of Anxiety; Chronic low back pain; Depression; Heart murmur; Hyperlipidemia; Hypertension; Menorrhagia; OA of knee; Obesity; and Personal history of colonic polyps, presents to the ED via EMS complaining of a mid sternal chest pain, described as tightness that radiates to her bilateral jaws and left shoulder since this morning at 7 AM. She reports associated nausea, mild SOB along with her CP episode. No exacerbating or alleviating factors are noted. She denies any family hx of heart disease. She states pain was 8/10 but now has subsided to 5/10. Patient was not given any medication en route by EMS and she did not take her aspirin this morning. She denies fever, chills, arm pain, numbness/weakness, dizziness or any other associated sx.       The history is provided by the patient. No  was used.     Review of patient's allergies indicates:  No Known Allergies  Past Medical History:   Diagnosis Date    Anxiety     Cancer     thyroid    Chronic low back pain     Depression     Heart murmur     Hyperlipidemia     Hypertension     Menorrhagia     OA (osteoarthritis) of knee     Obesity     Personal history of colonic polyps      Past Surgical History:   Procedure Laterality Date    breast reduction       SECTION, LOW TRANSVERSE      HYSTERECTOMY      TOTAL KNEE ARTHROPLASTY      left     Family History   Problem Relation Age of Onset    Hypertension Mother     Colon cancer Father      colon    Alzheimer's disease Father      Social History    Substance Use Topics    Smoking status: Former Smoker     Types: Cigarettes     Quit date: 7/31/2012    Smokeless tobacco: Never Used    Alcohol use No     Review of Systems   Constitutional: Positive for diaphoresis. Negative for chills and fever.   HENT: Negative for congestion, ear pain, rhinorrhea and sore throat.    Eyes: Negative for pain and visual disturbance.   Respiratory: Positive for shortness of breath (mild). Negative for cough.    Cardiovascular: Positive for chest pain (mid sternal chest tightness with radiating pain to bilateral jaws and L shoulder).   Gastrointestinal: Positive for nausea. Negative for abdominal pain, diarrhea and vomiting.   Genitourinary: Negative for dysuria.   Musculoskeletal: Negative for back pain and neck pain.   Skin: Negative for rash.   Neurological: Negative for headaches.       Physical Exam     Initial Vitals [08/23/17 1021]   BP Pulse Resp Temp SpO2   (!) 148/76 (!) 50 20 98.1 °F (36.7 °C) 99 %      MAP       100         Physical Exam well-developed well-nourished white female alert oriented ×3  HEENT exam pupils reactive extraocular movements full nonicteric sclera TMs gray nares benign moist mixed membranes posterior pharynx benign  Neck well-healed anterior crescent-shaped surgical incision, one plus carotids without bruit no subclavicular bruit no significant adenopathy  Lungs clear no rales rhonchi or wheezing no chest wall tenderness  Cardiac vascular regular rate and rhythm no murmur rub or gallop  Abdomen bowel sounds positive soft nontender no masses or prostatomegaly  Musca skeletal without deformity  Skin without rash distal pulses full and equal    ED Course   Procedures  Labs Reviewed   CBC W/ AUTO DIFFERENTIAL - Abnormal; Notable for the following:        Result Value    Platelets 386 (*)     All other components within normal limits   COMPREHENSIVE METABOLIC PANEL - Abnormal; Notable for the following:     CO2 31 (*)     Glucose 120 (*)     Albumin  3.4 (*)     Anion Gap 6 (*)     All other components within normal limits   B-TYPE NATRIURETIC PEPTIDE - Abnormal; Notable for the following:      (*)     All other components within normal limits   PROTIME-INR   TROPONIN I   URINALYSIS     EKG Readings: (Independently Interpreted)   [place] sinus bradycardia of 45 bpm normal UT normal QT early criteria for LVH inferior lateral ischemic changes noted, also some inferior lateral ischemia noted on August 1 EKG 2012             MDM patient with history of hypertension and hyperlipidemia status post recent thyroid surgery for malignancy, presents with onset of centralized chest tightness with radiation to her jaw and her left shoulder associated nausea diaphoresis and slight shortness of breath.  Pain was 8 out of 10 ambulance states she was given nothing did not take her morning aspirin and is currently a 5 out of 10.  No family history cardiac disease differential includes acute MI unstable angina pneumothorax aortic dissection I doubt pulmonary embolus          Scribe Attestation:   Scribe #1: I performed the above scribed service and the documentation accurately describes the services I performed. I attest to the accuracy of the note.    Attending Attestation:           Physician Attestation for Scribe:  Physician Attestation Statement for Scribe #1: I, Brown Lynn MD, reviewed documentation, as scribed by Zaina Aponte in my presence, and it is both accurate and complete.                 ED Course     Clinical Impression:   The encounter diagnosis was Chest pain, unspecified type.                           Brown Lynn MD  08/23/17 9317

## 2017-08-24 VITALS
WEIGHT: 184.5 LBS | RESPIRATION RATE: 16 BRPM | OXYGEN SATURATION: 98 % | DIASTOLIC BLOOD PRESSURE: 55 MMHG | SYSTOLIC BLOOD PRESSURE: 102 MMHG | HEIGHT: 61 IN | HEART RATE: 50 BPM | BODY MASS INDEX: 34.83 KG/M2 | TEMPERATURE: 97 F

## 2017-08-24 PROBLEM — E89.0 S/P THYROIDECTOMY: Status: ACTIVE | Noted: 2017-08-24

## 2017-08-24 PROBLEM — Z90.89 S/P THYROIDECTOMY: Status: ACTIVE | Noted: 2017-08-24

## 2017-08-24 PROBLEM — Z98.890 S/P THYROIDECTOMY: Status: ACTIVE | Noted: 2017-08-24

## 2017-08-24 LAB
ALBUMIN SERPL BCP-MCNC: 3.4 G/DL
ALP SERPL-CCNC: 87 U/L
ALT SERPL W/O P-5'-P-CCNC: 22 U/L
ANION GAP SERPL CALC-SCNC: 7 MMOL/L
AST SERPL-CCNC: 17 U/L
BILIRUB SERPL-MCNC: 0.5 MG/DL
BUN SERPL-MCNC: 15 MG/DL
CALCIUM SERPL-MCNC: 9.5 MG/DL
CHLORIDE SERPL-SCNC: 106 MMOL/L
CO2 SERPL-SCNC: 29 MMOL/L
CREAT SERPL-MCNC: 0.8 MG/DL
DIASTOLIC DYSFUNCTION: NO
EST. GFR  (AFRICAN AMERICAN): >60 ML/MIN/1.73 M^2
EST. GFR  (NON AFRICAN AMERICAN): >60 ML/MIN/1.73 M^2
GLUCOSE SERPL-MCNC: 89 MG/DL
POTASSIUM SERPL-SCNC: 4.9 MMOL/L
PROT SERPL-MCNC: 6.6 G/DL
SODIUM SERPL-SCNC: 142 MMOL/L

## 2017-08-24 PROCEDURE — 78452 HT MUSCLE IMAGE SPECT MULT: CPT | Mod: 26,,, | Performed by: INTERNAL MEDICINE

## 2017-08-24 PROCEDURE — 80053 COMPREHEN METABOLIC PANEL: CPT

## 2017-08-24 PROCEDURE — 63600175 PHARM REV CODE 636 W HCPCS

## 2017-08-24 PROCEDURE — 25000003 PHARM REV CODE 250

## 2017-08-24 PROCEDURE — 93016 CV STRESS TEST SUPVJ ONLY: CPT | Mod: ,,, | Performed by: INTERNAL MEDICINE

## 2017-08-24 PROCEDURE — 36415 COLL VENOUS BLD VENIPUNCTURE: CPT

## 2017-08-24 PROCEDURE — 93018 CV STRESS TEST I&R ONLY: CPT | Mod: ,,, | Performed by: INTERNAL MEDICINE

## 2017-08-24 PROCEDURE — 25000003 PHARM REV CODE 250: Performed by: HOSPITALIST

## 2017-08-24 PROCEDURE — 93017 CV STRESS TEST TRACING ONLY: CPT

## 2017-08-24 PROCEDURE — 99224 PR SUBSEQUENT OBSERVATION CARE,LEVEL I: CPT | Mod: 25,,, | Performed by: INTERNAL MEDICINE

## 2017-08-24 PROCEDURE — G0378 HOSPITAL OBSERVATION PER HR: HCPCS

## 2017-08-24 RX ORDER — REGADENOSON 0.08 MG/ML
INJECTION, SOLUTION INTRAVENOUS
Status: DISCONTINUED
Start: 2017-08-24 | End: 2017-08-24 | Stop reason: HOSPADM

## 2017-08-24 RX ORDER — CLONAZEPAM 0.5 MG/1
2 TABLET ORAL 2 TIMES DAILY PRN
Status: DISCONTINUED | OUTPATIENT
Start: 2017-08-24 | End: 2017-08-24 | Stop reason: HOSPADM

## 2017-08-24 RX ORDER — CLONAZEPAM 0.5 MG/1
1 TABLET ORAL ONCE
Status: COMPLETED | OUTPATIENT
Start: 2017-08-24 | End: 2017-08-24

## 2017-08-24 RX ORDER — ACETAMINOPHEN AND CODEINE PHOSPHATE 300; 60 MG/1; MG/1
1 TABLET ORAL ONCE
Status: COMPLETED | OUTPATIENT
Start: 2017-08-24 | End: 2017-08-24

## 2017-08-24 RX ADMIN — CLONAZEPAM 2 MG: 0.5 TABLET ORAL at 02:08

## 2017-08-24 RX ADMIN — CLONAZEPAM 1 MG: 0.5 TABLET ORAL at 02:08

## 2017-08-24 RX ADMIN — LOSARTAN POTASSIUM 50 MG: 25 TABLET, FILM COATED ORAL at 09:08

## 2017-08-24 RX ADMIN — VENLAFAXINE HYDROCHLORIDE 150 MG: 37.5 CAPSULE, EXTENDED RELEASE ORAL at 09:08

## 2017-08-24 RX ADMIN — ACETAMINOPHEN AND CODEINE PHOSPHATE 1 TABLET: 300; 60 TABLET ORAL at 04:08

## 2017-08-24 RX ADMIN — NITROGLYCERIN 0.5 INCH: 20 OINTMENT TOPICAL at 06:08

## 2017-08-24 RX ADMIN — ASPIRIN 325 MG: 325 TABLET, DELAYED RELEASE ORAL at 09:08

## 2017-08-24 NOTE — PLAN OF CARE
08/24/2017      Jorge Joshua   [address]  [facility] [address]       [facility]  Department of Hospital Medicine  [address]  [place], [place] [id number] [phone] fax      Jorge Joshua has been hospitalized at the [facility] since 8/23/2017.  Please excuse the patient from duties.  Patient may return on 08/28/2017 .  No restrictions.     Please contact me if you have any questions.                  __________________________  DONITA Davison  08/24/2017

## 2017-08-24 NOTE — NURSING
Report received from JENISE Lechuga. Visualized patient and assessed patient's overall condition and appearance. NAD noted. Will continue to monitor.

## 2017-08-24 NOTE — H&P
[facility] Hospital Medicine  History & Physical    Patient Name: Jorge Joshua  MRN: [id number]  Admission Date: 2017  Attending Physician: Kenton Muñiz MD   Primary Care Provider: Adelaida Hernandez MD         Patient information was obtained from patient and ER records.     Subjective:     Principal Problem:<principal problem not specified>    Chief Complaint:   Chief Complaint   Patient presents with    Chest Pain     intermittent midsternal chest pain/bilat jaw pain/diaphoresis since 7am         HPI: Jogre Joshua is a 61 y.o. female with a history as below who presented to the ED with multiple vague complaints. She c/o initially jaw pain, nausea, diarrhea and chest tightness. She reports when she woke up she felt some jaw pain and thought possibly a toothache; she went work, felt nauseated, thought she had to vomit, but instead, had diarrhea. She further reports as she was standing up she felt like she was going to pass out, then chest started to feel tight and jaw pain returned. She states symptoms wax/wane, off/on. Denies known aggravating/alleviating factors. Reports thyroidectomy 2017, followed by endocrinology with recent adjustment made to levothyroxine. In ED, EKG significant for SB. CXR without acute findings. Troponin negative. BNP mildly elevated.  CBC and chemistry unimpressive. Admitted for ACS r/o and symptom management with cardiology consulted in ED.             Past Medical History:   Diagnosis Date    Anxiety     Cancer     thyroid    Chronic low back pain     Depression     Heart murmur     Hyperlipidemia     Hypertension     Menorrhagia     OA (osteoarthritis) of knee     Obesity     Personal history of colonic polyps        Past Surgical History:   Procedure Laterality Date    breast reduction       SECTION, LOW TRANSVERSE      HYSTERECTOMY      TOTAL KNEE ARTHROPLASTY      left       Review of patient's allergies indicates:  No  Known Allergies    No current facility-administered medications on file prior to encounter.      Current Outpatient Prescriptions on File Prior to Encounter   Medication Sig    clonazePAM (KLONOPIN) 2 MG Tab     hydrocodone-acetaminophen 5-325mg (NORCO) 5-325 mg per tablet     losartan (COZAAR) 50 MG tablet Take 50 mg by mouth once daily.    potassium chloride (K-TAB) 20 mEq Take 1 tablet (20 mEq total) by mouth once daily.    venlafaxine (EFFEXOR-XR) 150 MG Cp24      Family History     Problem Relation (Age of Onset)    Alzheimer's disease Father    Colon cancer Father    Hypertension Mother        Social History Main Topics    Smoking status: Former Smoker     Types: Cigarettes     Quit date: 7/31/2012    Smokeless tobacco: Never Used    Alcohol use No    Drug use: No    Sexual activity: Yes     Partners: Male     Review of Systems   Constitutional: Negative for chills, diaphoresis and fever.   HENT: Negative for congestion, postnasal drip, sinus pressure and sore throat.    Eyes: Negative for visual disturbance.   Respiratory: Negative for cough, chest tightness, shortness of breath and wheezing.    Cardiovascular: Positive for chest pain. Negative for palpitations and leg swelling.   Gastrointestinal: Positive for diarrhea and nausea. Negative for abdominal distention, abdominal pain, blood in stool, constipation and vomiting.   Endocrine: Negative for polydipsia and polyuria.   Genitourinary: Negative for dysuria.   Musculoskeletal: Negative.         Jaw pain   Skin: Negative.    Allergic/Immunologic: Negative.    Neurological: Negative for dizziness, weakness, numbness and headaches.   Hematological: Negative.    Psychiatric/Behavioral: Negative.      Objective:     Vital Signs (Most Recent):  Temp: 98.8 °F (37.1 °C) (08/24/17 0753)  Pulse: (!) 45 (08/24/17 0753)  Resp: 18 (08/24/17 0753)  BP: (!) 154/70 (08/24/17 0753)  SpO2: 98 % (08/24/17 0753) Vital Signs (24h Range):  Temp:  [97.6 °F (36.4  °C)-98.8 °F (37.1 °C)] 98.8 °F (37.1 °C)  Pulse:  [42-50] 45  Resp:  [17-19] 18  SpO2:  [96 %-100 %] 98 %  BP: (119-186)/(59-79) 154/70     Weight: 83.7 kg (184 lb 8 oz)  Body mass index is 34.86 kg/m².    Physical Exam   Constitutional: She is oriented to person, place, and time. She appears well-developed and well-nourished. She is cooperative.  Non-toxic appearance. No distress.   HENT:   Head: Normocephalic and atraumatic.   Eyes: Conjunctivae and lids are normal. Pupils are equal, round, and reactive to light.   Neck: Trachea normal, normal range of motion and full passive range of motion without pain. Neck supple. Normal carotid pulses and no JVD present. No tracheal deviation present. No thyroid mass and no thyromegaly present.   Cardiovascular: Regular rhythm, S1 normal, S2 normal, normal heart sounds and normal pulses.  Bradycardia present.    Pulmonary/Chest: Effort normal and breath sounds normal. No stridor.   Abdominal: Soft. Normal appearance and bowel sounds are normal. There is no tenderness.   Musculoskeletal: Normal range of motion.   Neurological: She is alert and oriented to person, place, and time. She has normal strength. No cranial nerve deficit or sensory deficit.   Skin: Skin is warm, dry and intact. She is not diaphoretic. No cyanosis. Nails show no clubbing.   Psychiatric: She has a normal mood and affect. Her speech is normal and behavior is normal. Judgment and thought content normal. Cognition and memory are normal.        Significant Labs:   CBC:   Recent Labs  Lab 08/23/17  1120   WBC 7.86   HGB 12.4   HCT 37.8   *     CMP:   Recent Labs  Lab 08/23/17  1120 08/24/17  0635    142   K 4.2 4.9    106   CO2 31* 29   * 89   BUN 14 15   CREATININE 0.7 0.8   CALCIUM 9.4 9.5   PROT 6.6 6.6   ALBUMIN 3.4* 3.4*   BILITOT 0.6 0.5   ALKPHOS 85 87   AST 16 17   ALT 18 22   ANIONGAP 6* 7*   EGFRNONAA >60 >60     Cardiac Markers:   Recent Labs  Lab 08/23/17  1120   *      Troponin:   Recent Labs  Lab 08/23/17  1120 08/23/17  1340 08/23/17  1902   TROPONINI <0.006 <0.006 <0.006  <0.006       Significant Imaging:   Imaging Results          X-Ray Chest AP Portable (Final result)  Result time 08/23/17 12:13:15    Final result by Ragini Tristan MD (08/23/17 12:13:15)                 Impression:     No acute intrathoracic process seen.      Electronically signed by: RAGINI TRISTAN MD  Date:     08/23/17  Time:    12:13              Narrative:     AP chest radiograph.      Comparison: 8/11/2017    Results: The lung volume appears adequate.  No parenchymal or pleural abnormality is seen.  The cardiac silhouette  and pulmonary vascularity appear within normal limits.  The mediastinum is midline. .  No pneumothorax.  The osseous structures  appear normal  for age.                                Assessment/Plan:     S/P thyroidectomy    Report history of thyroid CA with thyroidectomy June 2017.  TSH on admit 0.055. Reports followed by endocrine with recent adjustments made to levoxyl Friday.  This could ?possibly be contributory to her symptoms.  Will consult endocrinology to assist with management            Chest pain    Admitted for rule out ACS.  Ischemic work-up (-) to date. EKG is non-ischemic. Initial troponin level negative. BNP wnl. Plan for continuous cardiac monitoring. Continue to trend serial troponins q6 hours X 2. ASA/NTG 0.4 mg SL PRN CP. Echo pending. Cardiology consulted with plans for NST in AM                 History of narcotic addiction    Discontinue IV and oral opioids.  Treat conservatively.         Major depression    Denies SI/HI. Continue Effexor at currently prescribed dose          Hypertension    Continue current home antihypertensive medication regimen          Anxiety    Continue current clonazepam at prescribed home dose            VTE Risk Mitigation     None             DONITA Davison  Department of Hospital Medicine   [facility]

## 2017-08-24 NOTE — PROGRESS NOTES
[facility]  Cardiology  Progress Note    Patient Name: Jorge Joshua  MRN: [id number]  Admission Date: 8/23/2017  Hospital Length of Stay: 0 days  Code Status: Full Code   Attending Physician: Kenton Muñiz MD   Primary Care Physician: Adelaida Hernandez MD  Expected Discharge Date:   Principal Problem:<principal problem not specified>    Subjective:     Hospital Course:   9/22: Admitted for chest pain, rule out for ACS    Interval History: Still complains of chest and jaw pain only relieved with narcotic pain medication    Review of Systems   Cardiovascular: Positive for chest pain.   Musculoskeletal: Positive for neck pain.   All other systems reviewed and are negative.    Objective:     Vital Signs (Most Recent):  Temp: 98.8 °F (37.1 °C) (08/24/17 0753)  Pulse: (!) 45 (08/24/17 0753)  Resp: 18 (08/24/17 0753)  BP: (!) 154/70 (08/24/17 0753)  SpO2: 98 % (08/24/17 0753) Vital Signs (24h Range):  Temp:  [97.6 °F (36.4 °C)-98.8 °F (37.1 °C)] 98.8 °F (37.1 °C)  Pulse:  [42-50] 45  Resp:  [17-18] 18  SpO2:  [96 %-100 %] 98 %  BP: (134-186)/(62-79) 154/70     Weight: 83.7 kg (184 lb 8 oz)  Body mass index is 34.86 kg/m².     SpO2: 98 %  O2 Device (Oxygen Therapy): room air      Intake/Output Summary (Last 24 hours) at 08/24/17 1141  Last data filed at 08/23/17 2200   Gross per 24 hour   Intake              240 ml   Output                0 ml   Net              240 ml       Lines/Drains/Airways     Peripheral Intravenous Line                 Peripheral IV - Single Lumen 08/23/17 1023 Left Hand 1 day                Physical Exam   Constitutional: She is oriented to person, place, and time. She appears well-developed and well-nourished.   HENT:   Head: Normocephalic and atraumatic.   Eyes: Conjunctivae and EOM are normal. Pupils are equal, round, and reactive to light.   Neck: Normal range of motion. Neck supple. No thyromegaly present.   Cardiovascular: Normal rate and regular rhythm.    Murmur  heard.   Harsh midsystolic murmur is present with a grade of 2/6  at the upper right sternal border radiating to the neck  Pulmonary/Chest: Effort normal and breath sounds normal. No respiratory distress.   Abdominal: Soft. Bowel sounds are normal.   Musculoskeletal: She exhibits no edema.   Neurological: She is alert and oriented to person, place, and time.   Skin: Skin is warm and dry.   Psychiatric: She has a normal mood and affect. Her behavior is normal.       Significant Labs:   BMP:   Recent Labs  Lab 08/23/17  1120 08/24/17  0635   * 89    142   K 4.2 4.9    106   CO2 31* 29   BUN 14 15   CREATININE 0.7 0.8   CALCIUM 9.4 9.5    and CBC   Recent Labs  Lab 08/23/17  1120   WBC 7.86   HGB 12.4   HCT 37.8   *       Significant Imaging: Echocardiogram:   2D echo with color flow doppler:   Results for orders placed or performed during the hospital encounter of 08/23/17   2D echo with color flow doppler   Result Value Ref Range    EF 55 55 - 65    Diastolic Dysfunction Yes (A)     Aortic Valve Stenosis TRIVIAL     Est. PA Systolic Pressure 37.11     Pericardial Effusion NONE     Tricuspid Valve Regurgitation MILD      Assessment and Plan:     Brief HPI:     Chest pain    Rule out for ACS  EF preserved by echo  Plan for stress test in a.m.        Hypertension    Titrate medicines as needed        Hyperlipidemia    Previously on statin        Anxiety             Obesity (BMI 30-39.9)    Encouraged diet and exercise tolerated            VTE Risk Mitigation     None        If nuclear stress within normal limits then okay for DC from CV standpoint follow-up with me in 2 weeks.    Víctor Limon MD  Cardiology  [facility]

## 2017-08-24 NOTE — NURSING
Ambulating in [place], continues to complaine about upper chest and jaw pain. States NTG doesn't help. Spoke with Dr Velez regarding pain and bradycardia to 41 with B/P 132/62. Order for once extra dose Tylenol #4.

## 2017-08-24 NOTE — SUBJECTIVE & OBJECTIVE
Past Medical History:   Diagnosis Date    Anxiety     Cancer     thyroid    Chronic low back pain     Depression     Heart murmur     Hyperlipidemia     Hypertension     Menorrhagia     OA (osteoarthritis) of knee     Obesity     Personal history of colonic polyps        Past Surgical History:   Procedure Laterality Date    breast reduction  1998     SECTION, LOW TRANSVERSE      HYSTERECTOMY  2012    TOTAL KNEE ARTHROPLASTY      left       Review of patient's allergies indicates:  No Known Allergies    No current facility-administered medications on file prior to encounter.      Current Outpatient Prescriptions on File Prior to Encounter   Medication Sig    clonazePAM (KLONOPIN) 2 MG Tab     hydrocodone-acetaminophen 5-325mg (NORCO) 5-325 mg per tablet     losartan (COZAAR) 50 MG tablet Take 50 mg by mouth once daily.    potassium chloride (K-TAB) 20 mEq Take 1 tablet (20 mEq total) by mouth once daily.    venlafaxine (EFFEXOR-XR) 150 MG Cp24      Family History     Problem Relation (Age of Onset)    Alzheimer's disease Father    Colon cancer Father    Hypertension Mother        Social History Main Topics    Smoking status: Former Smoker     Types: Cigarettes     Quit date: 2012    Smokeless tobacco: Never Used    Alcohol use No    Drug use: No    Sexual activity: Yes     Partners: Male     Review of Systems   Constitutional: Negative for chills, diaphoresis and fever.   HENT: Negative for congestion, postnasal drip, sinus pressure and sore throat.    Eyes: Negative for visual disturbance.   Respiratory: Negative for cough, chest tightness, shortness of breath and wheezing.    Cardiovascular: Positive for chest pain. Negative for palpitations and leg swelling.   Gastrointestinal: Positive for diarrhea and nausea. Negative for abdominal distention, abdominal pain, blood in stool, constipation and vomiting.   Endocrine: Negative for polydipsia and polyuria.   Genitourinary:  Negative for dysuria.   Musculoskeletal: Negative.         Jaw pain   Skin: Negative.    Allergic/Immunologic: Negative.    Neurological: Negative for dizziness, weakness, numbness and headaches.   Hematological: Negative.    Psychiatric/Behavioral: Negative.      Objective:     Vital Signs (Most Recent):  Temp: 98.8 °F (37.1 °C) (08/24/17 0753)  Pulse: (!) 45 (08/24/17 0753)  Resp: 18 (08/24/17 0753)  BP: (!) 154/70 (08/24/17 0753)  SpO2: 98 % (08/24/17 0753) Vital Signs (24h Range):  Temp:  [97.6 °F (36.4 °C)-98.8 °F (37.1 °C)] 98.8 °F (37.1 °C)  Pulse:  [42-50] 45  Resp:  [17-19] 18  SpO2:  [96 %-100 %] 98 %  BP: (119-186)/(59-79) 154/70     Weight: 83.7 kg (184 lb 8 oz)  Body mass index is 34.86 kg/m².    Physical Exam   Constitutional: She is oriented to person, place, and time. She appears well-developed and well-nourished. She is cooperative.  Non-toxic appearance. No distress.   HENT:   Head: Normocephalic and atraumatic.   Eyes: Conjunctivae and lids are normal. Pupils are equal, round, and reactive to light.   Neck: Trachea normal, normal range of motion and full passive range of motion without pain. Neck supple. Normal carotid pulses and no JVD present. No tracheal deviation present. No thyroid mass and no thyromegaly present.   Cardiovascular: Regular rhythm, S1 normal, S2 normal, normal heart sounds and normal pulses.  Bradycardia present.    Pulmonary/Chest: Effort normal and breath sounds normal. No stridor.   Abdominal: Soft. Normal appearance and bowel sounds are normal. There is no tenderness.   Musculoskeletal: Normal range of motion.   Neurological: She is alert and oriented to person, place, and time. She has normal strength. No cranial nerve deficit or sensory deficit.   Skin: Skin is warm, dry and intact. She is not diaphoretic. No cyanosis. Nails show no clubbing.   Psychiatric: She has a normal mood and affect. Her speech is normal and behavior is normal. Judgment and thought content normal.  Cognition and memory are normal.        Significant Labs:   CBC:   Recent Labs  Lab 08/23/17  1120   WBC 7.86   HGB 12.4   HCT 37.8   *     CMP:   Recent Labs  Lab 08/23/17  1120 08/24/17  0635    142   K 4.2 4.9    106   CO2 31* 29   * 89   BUN 14 15   CREATININE 0.7 0.8   CALCIUM 9.4 9.5   PROT 6.6 6.6   ALBUMIN 3.4* 3.4*   BILITOT 0.6 0.5   ALKPHOS 85 87   AST 16 17   ALT 18 22   ANIONGAP 6* 7*   EGFRNONAA >60 >60     Cardiac Markers:   Recent Labs  Lab 08/23/17  1120   *     Troponin:   Recent Labs  Lab 08/23/17  1120 08/23/17  1340 08/23/17  1902   TROPONINI <0.006 <0.006 <0.006  <0.006       Significant Imaging:   Imaging Results          X-Ray Chest AP Portable (Final result)  Result time 08/23/17 12:13:15    Final result by Ragini Tristan MD (08/23/17 12:13:15)                 Impression:     No acute intrathoracic process seen.      Electronically signed by: RAGINI TRISTAN MD  Date:     08/23/17  Time:    12:13              Narrative:     AP chest radiograph.      Comparison: 8/11/2017    Results: The lung volume appears adequate.  No parenchymal or pleural abnormality is seen.  The cardiac silhouette  and pulmonary vascularity appear within normal limits.  The mediastinum is midline. .  No pneumothorax.  The osseous structures  appear normal  for age.

## 2017-08-24 NOTE — SUBJECTIVE & OBJECTIVE
Interval History: Still complains of chest and jaw pain only relieved with narcotic pain medication    Review of Systems   Cardiovascular: Positive for chest pain.   Musculoskeletal: Positive for neck pain.   All other systems reviewed and are negative.    Objective:     Vital Signs (Most Recent):  Temp: 98.8 °F (37.1 °C) (08/24/17 0753)  Pulse: (!) 45 (08/24/17 0753)  Resp: 18 (08/24/17 0753)  BP: (!) 154/70 (08/24/17 0753)  SpO2: 98 % (08/24/17 0753) Vital Signs (24h Range):  Temp:  [97.6 °F (36.4 °C)-98.8 °F (37.1 °C)] 98.8 °F (37.1 °C)  Pulse:  [42-50] 45  Resp:  [17-18] 18  SpO2:  [96 %-100 %] 98 %  BP: (134-186)/(62-79) 154/70     Weight: 83.7 kg (184 lb 8 oz)  Body mass index is 34.86 kg/m².     SpO2: 98 %  O2 Device (Oxygen Therapy): room air      Intake/Output Summary (Last 24 hours) at 08/24/17 1141  Last data filed at 08/23/17 2200   Gross per 24 hour   Intake              240 ml   Output                0 ml   Net              240 ml       Lines/Drains/Airways     Peripheral Intravenous Line                 Peripheral IV - Single Lumen 08/23/17 1023 Left Hand 1 day                Physical Exam   Constitutional: She is oriented to person, place, and time. She appears well-developed and well-nourished.   HENT:   Head: Normocephalic and atraumatic.   Eyes: Conjunctivae and EOM are normal. Pupils are equal, round, and reactive to light.   Neck: Normal range of motion. Neck supple. No thyromegaly present.   Cardiovascular: Normal rate and regular rhythm.    Murmur heard.   Harsh midsystolic murmur is present with a grade of 2/6  at the upper right sternal border radiating to the neck  Pulmonary/Chest: Effort normal and breath sounds normal. No respiratory distress.   Abdominal: Soft. Bowel sounds are normal.   Musculoskeletal: She exhibits no edema.   Neurological: She is alert and oriented to person, place, and time.   Skin: Skin is warm and dry.   Psychiatric: She has a normal mood and affect. Her behavior  is normal.       Significant Labs:   BMP:   Recent Labs  Lab 08/23/17  1120 08/24/17  0635   * 89    142   K 4.2 4.9    106   CO2 31* 29   BUN 14 15   CREATININE 0.7 0.8   CALCIUM 9.4 9.5    and CBC   Recent Labs  Lab 08/23/17  1120   WBC 7.86   HGB 12.4   HCT 37.8   *       Significant Imaging: Echocardiogram:   2D echo with color flow doppler:   Results for orders placed or performed during the hospital encounter of 08/23/17   2D echo with color flow doppler   Result Value Ref Range    EF 55 55 - 65    Diastolic Dysfunction Yes (A)     Aortic Valve Stenosis TRIVIAL     Est. PA Systolic Pressure 37.11     Pericardial Effusion NONE     Tricuspid Valve Regurgitation MILD

## 2017-08-24 NOTE — CONSULTS
Reason for consult : Recent thyroidectomy for thyroid cancer.     HPI : Ms Joshua is a 61 yr old woman with recent Hx of total thyroidectomy for PTC and is on thyroid hormone suppression for thyroid cancer with levothyroxine 125 mcg daily. Post surgery she was on synthroid 137 mcg daily and due to severe suppression the dose was reduced to 125 mcg daily.  She was admitted on 17 with jaw pain , chest tightness and was admitted for further cardiac work up.   No previous Hx of CAD. No family Hx of thryoid cancer.   She has underlying anxiety problem. Since surgery she is more emotional -depressed , tendency to cry easily , irritable , weight gain . She just don't feel right . Clinically she has more of hypothyroid symptoms than hyperthyroid ( recent diarrhea and irritability/anxiety) .   Denied obstructive symptoms. Patient not sure of the pathology report.   Since admission she is bradycardic. Still gets jaw pain intermittently.     Past Medical History:   Diagnosis Date    Anxiety     Cancer     thyroid    Chronic low back pain     Depression     Heart murmur     Hyperlipidemia     Hypertension     Menorrhagia     OA (osteoarthritis) of knee     Obesity     Personal history of colonic polyps      Past Surgical History:   Procedure Laterality Date    breast reduction       SECTION, LOW TRANSVERSE      HYSTERECTOMY  2012    TOTAL KNEE ARTHROPLASTY      left     Social History     Social History    Marital status:      Spouse name: kelly    Number of children: 2    Years of education: 12     Occupational History     [employer]     Social History Main Topics    Smoking status: Former Smoker     Types: Cigarettes     Quit date: 2012    Smokeless tobacco: Never Used    Alcohol use No    Drug use: No    Sexual activity: Yes     Partners: Male     Other Topics Concern    Not on file     Social History Narrative    No narrative on file      Family History   Problem Relation Age of Onset    Hypertension Mother     Colon cancer Father      colon    Alzheimer's disease Father      No current facility-administered medications on file prior to encounter.      Current Outpatient Prescriptions on File Prior to Encounter   Medication Sig Dispense Refill    clonazePAM (KLONOPIN) 2 MG Tab       hydrocodone-acetaminophen 5-325mg (NORCO) 5-325 mg per tablet       losartan (COZAAR) 50 MG tablet Take 50 mg by mouth once daily.  1    potassium chloride (K-TAB) 20 mEq Take 1 tablet (20 mEq total) by mouth once daily. 30 tablet 0    venlafaxine (EFFEXOR-XR) 150 MG Cp24        Review of patient's allergies indicates:  No Known Allergies    Review of Systems    Constitutional: Negative for activity change, + appetite change, + fatigue and + unexpected weight change.   HENT: Negative for congestion.   Eyes: Negative for discharge and visual disturbance.  Respiratory: Negative for cough,  +chest tightness and no shortness of breath.    Cardiovascular: Negative for  palpitations and leg swelling.   Gastrointestinal: Negative for abdominal distention.   Endocrine: Negative for cold intolerance,  polydipsia, polyphagia and polyuria.   Genitourinary: Negative for frequency and decreased urine volume.  Neurological: Negative for dizziness, tremors, speech difficulty, weakness, light-headedness, numbness and headaches.  Hematological: Negative for adenopathy.  Psychiatric/Behavioral: Negative for agitation.     Physical Exam   Vitals:    08/24/17 0753   BP: (!) 154/70   Pulse: (!) 45   Resp: 18   Temp: 98.8 °F (37.1 °C)       Constitutional:  oriented to person, place, and time. Emotional.  HENT: Head: Normocephalic and atraumatic.   Right Ear:External ear normal.  Left Ear: External ear normal. Eyes: Conjunctivae are normal. Pupils are equal, round, and reactive to light.  Neck: Normal range of motion. Neck supple. Healed thyroidectomy scar.   Cardiovascular: Normal  rate and regular rhythm.   Pulmonary/Chest: Effort normal and breath sounds normal.   Abdominal: Soft. Bowel sounds are normal. Non distended and non tender.    Musculoskeletal: Normal range of motion. exhibits no edema or tenderness.   Neurological: alert and oriented to person, place, and time.    Skin: Skin is warm and dry.    Psychiatric:  has a normal mood and affect.     Labs :   Results for MARK POSADA (MRN [id number]) as of 8/24/2017 13:00   Ref. Range 8/11/2017 14:28 8/17/2017 09:07 8/23/2017 11:20   TSH Latest Ref Range: 0.400 - 4.000 uIU/mL 0.086 (L) 0.052 (L) 0.055 (L)   Free T4 Latest Ref Range: 0.71 - 1.51 ng/dL 1.14 1.05 1.40   Thyroglobulin Interpretation Unknown  SEE BELOW    Thyroglobulin Antibody Screen Latest Ref Range: <4.0 IU/mL  <1.8    Thyroglobulin, Tumor Marker Latest Units: ng/mL  0.1 (H)        Assessment and Plan :     Ms Posada is a 61 yr old woman with recent total thyroidectomy , pathology showing thyroid cancer ,on TSH suppression therapy for thyroid cancer is admitted with chest tightness , jaw pain and bradycardia.   Even though labs showing excess thyroid hormone , her clinical picture is more of hypothyroidism. High likely galarza the increased anxiety level is due to increased thyroid hormone level. But will need further evaluation for bradycardia and Jaw pain. Levothyroxine dose was reduced to 125 mcg 5 days ago , therefore further dose adjustment can be made in 4-6 weeks .     Plan :   -Will get pathology report. Based on the size of the tumor /extend of thyroid cancer , will decide if she needs radioactive iodine suppression therapy or not.   - If patient not able to tolerate the current dose , then will have to reduce to synthroid 112 mcg daily. If she is low risk PTC patient , then can keep the TSH target at the low normal range.   - Will see her as OP in 2 weeks. Discussed the treatment plan and also advised to bring the path report .     Case discussed in detail with the  patient.     Thank you for involving me in the care of this patient.        Fidelina Morales M.D.  Endocrinology     [address], [address]  [place] [address]  [phone] ([facility])  [phone] (Fax

## 2017-08-24 NOTE — NURSING
Provided patient with d/c instructions and patient verbalized understanding. Patient refused to wait for transport, ambulating off unit for discharge. NAD noted.

## 2017-08-24 NOTE — PLAN OF CARE
Problem: Fall Risk (Adult)  Goal: Absence of Falls  Patient will demonstrate the desired outcomes by discharge/transition of care.   Outcome: Ongoing (interventions implemented as appropriate)   08/24/17 1507   Fall Risk (Adult)   Absence of Falls making progress toward outcome       Problem: Patient Care Overview  Goal: Plan of Care Review  Outcome: Ongoing (interventions implemented as appropriate)   08/24/17 1507   Coping/Psychosocial   Plan Of Care Reviewed With patient       Problem: Pain, Acute (Adult)  Goal: Acceptable Pain Control/Comfort Level  Patient will demonstrate the desired outcomes by discharge/transition of care.   Outcome: Ongoing (interventions implemented as appropriate)   08/24/17 1507   Pain, Acute (Adult)   Acceptable Pain Control/Comfort Level making progress toward outcome       Problem: Anxiety (Adult)  Goal: Reduction/Resolution  Patient will demonstrate the desired outcomes by discharge/transition of care.   Outcome: Ongoing (interventions implemented as appropriate)   08/24/17 1507   Anxiety (Adult)   Reduction/Resolution making progress toward outcome

## 2017-08-24 NOTE — PLAN OF CARE
Problem: Fall Risk (Adult)  Goal: Identify Related Risk Factors and Signs and Symptoms  Related risk factors and signs and symptoms are identified upon initiation of Human Response Clinical Practice Guideline (CPG)   Outcome: Ongoing (interventions implemented as appropriate)   08/24/17 0319   Fall Risk   Related Risk Factors (Fall Risk) depression/anxiety;polypharmacy;sleep pattern alteration;environment unfamiliar   Signs and Symptoms (Fall Risk) presence of risk factors     Plan of care reviewed with patient and . Informed of meds ordered for pain and anxiety. Pain relieved with Tylenol # 4 dose initially, but pain returned. Patient voiced that she was on Clonazepam 2mg at home and only 1mg here. Spoke with Dr Velez and dose adjusted. Patient able to fall asleep after increased dose. Handout for nuclear stress given to patient.    Problem: Patient Care Overview  Goal: Plan of Care Review  Outcome: Ongoing (interventions implemented as appropriate)   08/24/17 0307   Coping/Psychosocial   Plan Of Care Reviewed With patient;spouse     Goal: Individualization & Mutuality  Outcome: Ongoing (interventions implemented as appropriate)   08/23/17 1400   Mutuality/Individual Preferences   What Information Would Help Us Give You More Personalized Care? anxiety       Problem: Pain, Acute (Adult)  Goal: Identify Related Risk Factors and Signs and Symptoms  Related risk factors and signs and symptoms are identified upon initiation of Human Response Clinical Practice Guideline (CPG)   Outcome: Ongoing (interventions implemented as appropriate)   08/24/17 0307   Pain, Acute   Related Risk Factors (Acute Pain) disease process;patient perception;persistent pain   Signs and Symptoms (Acute Pain) sleep pattern alteration;verbalization of pain descriptors     Goal: Acceptable Pain Control/Comfort Level  Patient will demonstrate the desired outcomes by discharge/transition of care.   Outcome: Ongoing (interventions  implemented as appropriate)   08/24/17 0307   Pain, Acute (Adult)   Acceptable Pain Control/Comfort Level making progress toward outcome       Problem: Anxiety (Adult)  Goal: Identify Related Risk Factors and Signs and Symptoms  Related risk factors and signs and symptoms are identified upon initiation of Human Response Clinical Practice Guideline (CPG)  Outcome: Ongoing (interventions implemented as appropriate)   08/24/17 0307   Anxiety   Related Risk Factors (Anxiety) coping ineffective;pain;procedure/treatment;substance use/abuse   Signs and Symptoms (Anxiety) apprehension/being worried;nervousness/tension/restlessness;sleep disturbance     Goal: Reduction/Resolution  Patient will demonstrate the desired outcomes by discharge/transition of care.  Outcome: Ongoing (interventions implemented as appropriate)   08/24/17 0307   Anxiety (Adult)   Reduction/Resolution making progress toward outcome

## 2017-08-24 NOTE — ASSESSMENT & PLAN NOTE
Admitted for rule out ACS.  Ischemic work-up (-) to date. EKG is non-ischemic. Initial troponin level negative. BNP wnl. Plan for continuous cardiac monitoring. Continue to trend serial troponins q6 hours X 2. ASA/NTG 0.4 mg SL PRN CP. Echo pending. Cardiology consulted with plans for NST in AM

## 2017-08-24 NOTE — PLAN OF CARE
08/23/17 1326   Discharge Assessment   Assessment Type Discharge Planning Assessment   Confirmed/corrected address and phone number on facesheet? Yes   Assessment information obtained from? Patient   Communicated expected length of stay with patient/caregiver yes   Prior to hospitilization cognitive status: Alert/Oriented   Prior to hospitalization functional status: Independent   Current cognitive status: Alert/Oriented   Current Functional Status: Independent   Lives With spouse   Able to Return to Prior Arrangements yes   Is patient able to care for self after discharge? Yes   Who are your caregiver(s) and their phone number(s)? Dom- benita- [phone]   Patient's perception of discharge disposition home or selfcare   Readmission Within The Last 30 Days no previous admission in last 30 days   Patient currently being followed by outpatient case management? No   Patient currently receives any other outside agency services? No   Is it the patient/care giver preference to resume care with the current outside agency? No   Equipment Currently Used at Home none   Do you have any problems affording any of your prescribed medications? No   Is the patient taking medications as prescribed? yes   Does the patient have transportation home? Yes   Transportation Available family or friend will provide   Does the patient receive services at the [facility]mad[facility] Clinic? No   Discharge Plan A Home with family   Patient/Family In Agreement With Plan yes     [facility] [place] [address]. - [place], [place] - [address] [address]  Phone: [phone] Fax: [phone]    [facility] - [place], [place] - [address]  [address]  [facility] [address]  Phone: [phone] Fax: [phone]    [facility] - [place], [place] - [address]  [address] [facility] [address]  Phone: [phone] Fax: [phone]

## 2017-08-24 NOTE — HPI
Jorge Joshua is a 61 y.o. female with a history as below who presented to the ED with multiple vague complaints. She c/o initially jaw pain, nausea, diarrhea and chest tightness. She reports when she woke up she felt some jaw pain and thought possibly a toothache; she went work, felt nauseated, thought she had to vomit, but instead, had diarrhea. She further reports as she was standing up she felt like she was going to pass out, then chest started to feel tight and jaw pain returned. She states symptoms wax/wane, off/on. Denies known aggravating/alleviating factors. Reports thyroidectomy June 2017, followed by endocrinology with recent adjustment made to levothyroxine. In ED, EKG significant for SB. CXR without acute findings. Troponin negative. BNP mildly elevated.  CBC and chemistry unimpressive. Admitted for ACS r/o and symptom management with cardiology consulted in ED.

## 2017-08-24 NOTE — NURSING
Report given to Rosas Bauer NP regarding bradycardia 39-40s and complaints of continued pain. Requested levothyroxine to be held and consult to endocrinology. Patient updated.

## 2017-08-24 NOTE — NURSING
In preparation for discharge, d/c'ed patient's saline lock, applied pressure to site, secured site with gauze and tape, no redness or swelling noted. D/C'ed patient's tele, SB, prior to removal. Patient is sitting in room waiting for discharge instructions. NAD noted.

## 2017-08-24 NOTE — ASSESSMENT & PLAN NOTE
Report history of thyroid CA with thyroidectomy June 2017.  TSH on admit 0.055. Reports followed by endocrine with recent adjustments made to levoxyl Friday.  This could ?possibly be contributory to her symptoms.  Will consult endocrinology to assist with management

## 2017-08-24 NOTE — PLAN OF CARE
08/24/17 1505   Final Note   Assessment Type Final Discharge Note   Discharge Disposition Home   Hospital Follow Up  Appt(s) scheduled? Yes   Discharge plans and expectations educations in teach back method with documentation complete? Yes   Right Care Referral Info   Post Acute Recommendation No Care     Follow-up Information     Víctor Limon MD On 9/7/2017.    Specialties:  INTERVENTIONAL CARDIOLOGY, Cardiology  Why:  Appointment scheduled for Thursday September 7th at 2pm  Contact information:  [address] [facility] [facility]  [address]  [place] [id number]  [phone]             Adelaida Hernandez MD On 9/6/2017.    Specialty:  Family Medicine  Why:  Appointment scheduled for Wednesday September 6th at 8:20am. Please be sure to keep all scheduled follow up appointments  Contact information:  [address]  [place] [id number] [phone]             Fidelina Morales MD. Call in 1 week.    Specialty:  Endocrinology  Contact information:  [address] [facility]  [address]  [place] [id number]  [phone]

## 2017-09-05 NOTE — DISCHARGE SUMMARY
[facility] Hospital Medicine  Discharge Summary      Patient Name: Jorge Joshua  MRN: [id number]  Admission Date: 8/23/2017  Hospital Length of Stay: 0 days  Discharge Date and Time: 8/24/2017  4:30 PM  Attending Physician: No att. providers found   Discharging Provider: DONITA Davison  Primary Care Provider: Adelaida Hernandez MD      HPI:   Jorge Joshua is a 61 y.o. female with a history as below who presented to the ED with multiple vague complaints. She c/o initially jaw pain, nausea, diarrhea and chest tightness. She reports when she woke up she felt some jaw pain and thought possibly a toothache; she went work, felt nauseated, thought she had to vomit, but instead, had diarrhea. She further reports as she was standing up she felt like she was going to pass out, then chest started to feel tight and jaw pain returned. She states symptoms wax/wane, off/on. Denies known aggravating/alleviating factors. Reports thyroidectomy June 2017, followed by endocrinology with recent adjustment made to levothyroxine. In ED, EKG significant for SB. CXR without acute findings. Troponin negative. BNP mildly elevated.  CBC and chemistry unimpressive. Admitted for ACS r/o and symptom management with cardiology consulted in ED.             * No surgery found *      Indwelling Lines/Drains at time of discharge:   Lines/Drains/Airways          No matching active lines, drains, or airways        Hospital Course:   Admitted for chest tightness with ACS ruled out.  EKG is non ischemic. Serial troponin levels normal x 3. BNP normal. CXR without acute cardiothoracic processes. Echocardiogram performed and shows EF 55% + DD. NST performed and is without evidence of myocardial ischemia. Cardiology consulted and after evaluation has cleared the patient for discharge from a CV standpoint. Also reports recent thyroid surgery for thyroid CA. Found to have decreased TSH levels. Patient reports she is followed by  endocrine services at [facility] but have been unable to obtain an appointment. Endocrinology consulted and evaluated and agrees to follow patient with plans for further outpatient evaluation. She is stable and will discharge home at this time with instructions to follow up with PCP and endocrinology in one to two weeks.        Consults:   Consults         Status Ordering Provider     Inpatient consult to Cardiology  Once     Provider:  Víctor Limon MD    Completed MARTA COOK     Inpatient consult to Endocrinology  Once     Provider:  Fidelina Morales MD    Completed RAD HARRIS          Significant Diagnostic Studies: Labs: All labs within the past 24 hours have been reviewed    Pending Diagnostic Studies:     Procedure Component Value Units Date/Time    NM Myocardial Perfusion Spect Multi Pharmacologic [[id number]] Updated:  08/24/17 1241    Order Status:  Sent Lab Status:  In process         Final Active Diagnoses:    Diagnosis Date Noted POA    PRINCIPAL PROBLEM:  Chest pain [R07.9] 08/23/2017 Yes    S/P thyroidectomy [E89.0] 08/24/2017 Not Applicable    History of narcotic addiction [F11.21] 03/06/2015 Yes    Anxiety [F41.9]  Yes    Hypertension [I10]  Yes    Major depression [F32.9]  Yes      Problems Resolved During this Admission:    Diagnosis Date Noted Date Resolved POA      No new Assessment & Plan notes have been filed under this hospital service since the last note was generated.  Service: Hospital Medicine      Discharged Condition: good    Disposition: Home or Self Care    Follow Up:  Follow-up Information     Víctor Limon MD On 9/7/2017.    Specialties:  INTERVENTIONAL CARDIOLOGY, Cardiology  Why:  Appointment scheduled for Thursday September 7th at 2pm  Contact information:  [address] [place] [address]  [phone]             Adelaida Hernandez MD On 9/6/2017.    Specialty:  Family Medicine  Why:  Appointment scheduled for Wednesday September 6th at  8:20am. Please be sure to keep all scheduled follow up appointments  Contact information:  [facility]  [phone]             Fidelina Morales MD. Call in 1 week.    Specialty:  Endocrinology  Contact information:  [address]  [address]  [place] [address]  [phone]                 Patient Instructions:     Diet general   Order Specific Question Answer Comments   Na restriction, if any: 2gNa      Activity as tolerated     Call MD for:  persistent dizziness, light-headedness, or visual disturbances     Call MD for:  increased confusion or weakness     Call MD for:  severe persistent headache     Call MD for:  difficulty breathing or increased cough     Call MD for:  severe uncontrolled pain       Medications:  Reconciled Home Medications:   Discharge Medication List as of 8/24/2017  3:28 PM      CONTINUE these medications which have NOT CHANGED    Details   clonazePAM (KLONOPIN) 2 MG Tab Starting Tue 8/1/2017, Historical Med      levothyroxine (LEVOXYL) 125 MCG tablet Take 125 mcg by mouth once daily., Historical Med      losartan (COZAAR) 50 MG tablet Take 50 mg by mouth once daily., Starting Thu 8/10/2017, Historical Med      potassium chloride (K-TAB) 20 mEq Take 1 tablet (20 mEq total) by mouth once daily., Starting Fri 8/11/2017, Normal      venlafaxine (EFFEXOR-XR) 150 MG Cp24 Starting Mon 7/31/2017, Historical Med         STOP taking these medications       hydrocodone-acetaminophen 5-325mg (NORCO) 5-325 mg per tablet Comments:   Reason for Stopping:             Time spent on the discharge of patient: 30 minutes    HOS POC IP DISCHARGE SUMMARY    DONITA Davison  Department of Hospital Medicine  [facility]

## 2017-09-05 NOTE — HOSPITAL COURSE
Admitted for chest tightness with ACS ruled out.  EKG is non ischemic. Serial troponin levels normal x 3. BNP normal. CXR without acute cardiothoracic processes. Echocardiogram performed and shows EF 55% + DD. NST performed and is without evidence of myocardial ischemia. Cardiology consulted and after evaluation has cleared the patient for discharge from a CV standpoint. Also reports recent thyroid surgery for thyroid CA. Found to have decreased TSH levels. Patient reports she is followed by endocrine services at [facility] but have been unable to obtain an appointment. Endocrinology consulted and evaluated and agrees to follow patient with plans for further outpatient evaluation. She is stable and will discharge home at this time with instructions to follow up with PCP and endocrinology in one to two weeks.

## 2017-11-30 ENCOUNTER — TELEPHONE (OUTPATIENT)
Dept: OBSTETRICS AND GYNECOLOGY | Facility: CLINIC | Age: 62
End: 2017-11-30

## 2017-11-30 NOTE — TELEPHONE ENCOUNTER
----- Message from Apple English sent at 11/29/2017  3:56 PM CST -----  Contact: Patient   X _1st Request  _  2nd Request  _  3rd Request    Who:MARK POSADA [[id number]]    Why:Patient states she needs to schedule a annual exam and discuss pellets     What Number to Call Back:[phone]    When to Expect a call back: (Before the end of the day)   -- if call after 3:00 call back will be tomorrow.

## 2017-12-18 ENCOUNTER — HOSPITAL ENCOUNTER (OUTPATIENT)
Dept: RADIOLOGY | Facility: HOSPITAL | Age: 62
Discharge: HOME | End: 2017-12-18
Attending: INTERNAL MEDICINE
Payer: COMMERCIAL

## 2017-12-18 DIAGNOSIS — C73 PRIMARY THYROID CANCER: ICD-10-CM

## 2017-12-18 PROCEDURE — 76536 US EXAM OF HEAD AND NECK: CPT | Mod: TC

## 2017-12-18 PROCEDURE — 76536 US EXAM OF HEAD AND NECK: CPT | Mod: 26,,, | Performed by: RADIOLOGY

## 2018-01-26 ENCOUNTER — TELEPHONE (OUTPATIENT)
Dept: OBSTETRICS AND GYNECOLOGY | Facility: CLINIC | Age: 63
End: 2018-01-26

## 2018-01-26 NOTE — TELEPHONE ENCOUNTER
Called Jorge Joshua to reschedule recent cancelled appointment.  No answer.  Mail box full unable to leave message.

## 2018-04-13 ENCOUNTER — HOSPITAL ENCOUNTER (EMERGENCY)
Facility: HOSPITAL | Age: 63
Discharge: HOME | End: 2018-04-13
Attending: EMERGENCY MEDICINE
Payer: COMMERCIAL

## 2018-04-13 VITALS
HEIGHT: 61 IN | BODY MASS INDEX: 35.5 KG/M2 | OXYGEN SATURATION: 98 % | DIASTOLIC BLOOD PRESSURE: 89 MMHG | WEIGHT: 188 LBS | HEART RATE: 66 BPM | TEMPERATURE: 98 F | RESPIRATION RATE: 18 BRPM | SYSTOLIC BLOOD PRESSURE: 213 MMHG

## 2018-04-13 DIAGNOSIS — M54.6 CHRONIC THORACIC BACK PAIN, UNSPECIFIED BACK PAIN LATERALITY: Primary | ICD-10-CM

## 2018-04-13 DIAGNOSIS — G89.29 CHRONIC THORACIC BACK PAIN, UNSPECIFIED BACK PAIN LATERALITY: Primary | ICD-10-CM

## 2018-04-13 PROCEDURE — 25000003 PHARM REV CODE 250: Performed by: EMERGENCY MEDICINE

## 2018-04-13 PROCEDURE — 99284 EMERGENCY DEPT VISIT MOD MDM: CPT

## 2018-04-13 RX ORDER — HYDROCODONE BITARTRATE AND ACETAMINOPHEN 5; 325 MG/1; MG/1
2 TABLET ORAL
Status: COMPLETED | OUTPATIENT
Start: 2018-04-13 | End: 2018-04-13

## 2018-04-13 RX ORDER — LOSARTAN POTASSIUM 25 MG/1
50 TABLET ORAL ONCE
Status: COMPLETED | OUTPATIENT
Start: 2018-04-13 | End: 2018-04-13

## 2018-04-13 RX ADMIN — HYDROCODONE BITARTRATE AND ACETAMINOPHEN 2 TABLET: 5; 325 TABLET ORAL at 02:04

## 2018-04-13 RX ADMIN — LOSARTAN POTASSIUM 50 MG: 25 TABLET, FILM COATED ORAL at 04:04

## 2018-04-13 NOTE — ED NOTES
Patient ambulated to nursing station to ask for ice. Ambulated without apparent distress or difficulty.

## 2018-04-13 NOTE — DISCHARGE INSTRUCTIONS
Is very important that you see your spine specialist as soon as possible to review your MRI and further manage her symptoms.  Continue using the medications you have been prescribed as well as warm compresses and stretching exercises as tolerated.  Return to emergency room for any new or worsening symptoms.

## 2018-04-13 NOTE — ED NOTES
"Patient again walking around unit stated that she was trying to "walk off the knot in her back". Instructed patient that she must remain in her room. Patient verbalized understanding.  "

## 2018-04-13 NOTE — ED TRIAGE NOTES
Patient presents to the ER via EMS. Patient presents with mid back pain that radiates to left side of abdomen. Reports not being able to get any relief from reposition. 10/10 pain
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
## 2019-01-08 ENCOUNTER — TELEPHONE (OUTPATIENT)
Dept: OBSTETRICS AND GYNECOLOGY | Facility: CLINIC | Age: 64
End: 2019-01-08

## 2019-01-08 NOTE — TELEPHONE ENCOUNTER
----- Message from Monica Burns sent at 1/8/2019 10:28 AM CST -----  Contact: Patient   Patient called to schedule an appointment for an annual exam and to establish care. The patient was referred by a patient that is under the providers care. The patient can be reached at [phone] or [phone].

## 2019-01-29 DIAGNOSIS — R00.2 PALPITATIONS: Primary | ICD-10-CM

## 2019-02-17 PROBLEM — I35.0 AS (AORTIC STENOSIS): Status: ACTIVE | Noted: 2019-02-17

## 2019-02-17 PROBLEM — R06.09 DYSPNEA ON EXERTION: Status: ACTIVE | Noted: 2019-02-17

## 2019-02-17 NOTE — PROGRESS NOTES
Subjective:   Patient ID:  Jorge Joshua is a 63 y.o. female who presents for evaluation of LOPEZ    HPI: She was supposed to see me years ago but did not-had CP in past coming now for LOPEZ-I have seen a number of family members. The patient has no chest pain TIA, palpitations, syncope or pre-syncope.Patient does not exercise much.        Review of Systems   Constitution: Negative for chills, decreased appetite, diaphoresis, fever, weakness, malaise/fatigue, night sweats, weight gain and weight loss.   HENT: Negative for congestion, hoarse voice, nosebleeds, sore throat and tinnitus.    Eyes: Negative for blurred vision, double vision, vision loss in left eye, vision loss in right eye, visual disturbance and visual halos.   Cardiovascular: Positive for dyspnea on exertion. Negative for chest pain, claudication, cyanosis, irregular heartbeat, leg swelling, near-syncope, orthopnea, palpitations, paroxysmal nocturnal dyspnea and syncope.   Respiratory: Positive for shortness of breath. Negative for cough, hemoptysis, sleep disturbances due to breathing, snoring, sputum production and wheezing.    Endocrine: Negative for cold intolerance, heat intolerance, polydipsia, polyphagia and polyuria.   Hematologic/Lymphatic: Negative for adenopathy and bleeding problem. Does not bruise/bleed easily.   Skin: Negative for color change, dry skin, flushing, itching, nail changes, poor wound healing, rash, skin cancer, suspicious lesions and unusual hair distribution.   Musculoskeletal: Negative for arthritis, back pain, falls, gout, joint pain, joint swelling, muscle cramps, muscle weakness, myalgias and stiffness.   Gastrointestinal: Negative for abdominal pain, anorexia, change in bowel habit, constipation, diarrhea, dysphagia, heartburn, hematemesis, hematochezia, melena and vomiting.   Genitourinary: Negative for decreased libido, dysuria, hematuria, hesitancy and urgency.   Neurological: Negative for excessive daytime sleepiness,  dizziness, focal weakness, headaches, light-headedness, loss of balance, numbness, paresthesias, seizures, sensory change, tremors and vertigo.   Psychiatric/Behavioral: Negative for altered mental status, depression, hallucinations, memory loss, substance abuse and suicidal ideas. The patient does not have insomnia and is not nervous/anxious.    Allergic/Immunologic: Negative for environmental allergies and hives.       Objective: BP (!) 165/96 (BP Location: Left arm, Patient Position: Sitting, BP Method: X-Large (Automatic))   Pulse 88   Ht 5' (1.524 m)   Wt 89.1 kg (196 lb 6.9 oz)   SpO2 (!) 94%   BMI 38.36 kg/m²      Physical Exam   Constitutional: She is oriented to person, place, and time. She appears well-developed and well-nourished.   HENT:   Head: Normocephalic.   Eyes: EOM are normal. Pupils are equal, round, and reactive to light.   Neck: Normal range of motion. Normal carotid pulses, no hepatojugular reflux and no JVD present. Carotid bruit is not present. No thyromegaly present.   Cardiovascular: Normal rate, regular rhythm, normal heart sounds and intact distal pulses. Exam reveals no gallop and no friction rub.   No murmur heard.  Pulmonary/Chest: Effort normal and breath sounds normal. No tachypnea. No respiratory distress. She has no wheezes. She has no rales. She exhibits no tenderness.   Abdominal: Soft. Bowel sounds are normal. She exhibits no distension and no mass. There is no tenderness. There is no rebound and no guarding.   Musculoskeletal: Normal range of motion. She exhibits no edema or tenderness.   Lymphadenopathy:     She has no cervical adenopathy.   Neurological: She is alert and oriented to person, place, and time. No cranial nerve deficit. Coordination normal.   Skin: Skin is warm. No rash noted. No erythema.   Psychiatric: She has a normal mood and affect. Her behavior is normal. Judgment and thought content normal.       Assessment:     1. Mixed hyperlipidemia    2. Dyspnea  on exertion    3. Essential hypertension    4. Obesity (BMI 30-39.9)    5. S/P thyroidectomy    6. Heart murmur    7. Chronic low back pain with sciatica, sciatica laterality unspecified, unspecified back pain laterality    8. Hypokalemia    9. Nonrheumatic aortic valve stenosis    10. At risk for coronary artery disease        Plan:   Discussed diet , achieving and maintaining ideal body weight, and exercise.   We reviewed meds in detail.  Reassured-discussed goals, options, plan.  Discussed CAC scanning.  Double losartan to 100 mg 2 50s or one 100  Add HCTZ/Triamterene 25/37.5 just half in am      Jorge was seen today for shortness of breath.    Diagnoses and all orders for this visit:    Mixed hyperlipidemia  -     Lipid panel; Future; Expected date: 02/19/2019  -     Comprehensive metabolic panel; Future; Expected date: 02/19/2019  -     TSH; Future; Expected date: 02/19/2019  -     T4, free; Future; Expected date: 02/19/2019  -     CT Cardiac Scoring; Future; Expected date: 02/19/2019    Dyspnea on exertion  -     Comprehensive metabolic panel; Future; Expected date: 02/19/2019    Essential hypertension  -     Comprehensive metabolic panel; Future; Expected date: 02/19/2019  -     TSH; Future; Expected date: 02/19/2019  -     T4, free; Future; Expected date: 02/19/2019  -     CT Cardiac Scoring; Future; Expected date: 02/19/2019  -     Basic metabolic panel; Future; Expected date: 04/08/2019  -     triamterene-hydrochlorothiazide 37.5-25 mg (MAXZIDE-25) 37.5-25 mg per tablet; Take 1 tablet by mouth once daily. Use as directed 0.5-1 daily or every other day  -     losartan (COZAAR) 100 MG tablet; Take 1 tablet (100 mg total) by mouth once daily.    Obesity (BMI 30-39.9)  -     TSH; Future; Expected date: 02/19/2019  -     T4, free; Future; Expected date: 02/19/2019  -     CT Cardiac Scoring; Future; Expected date: 02/19/2019    S/P thyroidectomy  -     TSH; Future; Expected date: 02/19/2019  -     T4, free;  Future; Expected date: 02/19/2019    Heart murmur    Chronic low back pain with sciatica, sciatica laterality unspecified, unspecified back pain laterality    Hypokalemia  -     Comprehensive metabolic panel; Future; Expected date: 02/19/2019  -     CT Cardiac Scoring; Future; Expected date: 02/19/2019  -     Basic metabolic panel; Future; Expected date: 04/08/2019    Nonrheumatic aortic valve stenosis    At risk for coronary artery disease  -     CT Cardiac Scoring; Future; Expected date: 02/19/2019    Other orders  -     methotrexate 2.5 MG Tab; every 7 days.   -     RHEUMATE 1-1-500 mg Cap; Take 1 capsule by mouth once daily.  -     ergocalciferol (ERGOCALCIFEROL) 50,000 unit Cap  -     dextroamphetamine-amphetamine 30 mg Tab; once daily.             Follow-up in about 6 months (around 8/18/2019) for no labs; blood and CAC today; chem 7 in 8 weeks.

## 2019-02-18 ENCOUNTER — HOSPITAL ENCOUNTER (OUTPATIENT)
Dept: CARDIOLOGY | Facility: CLINIC | Age: 64
Discharge: HOME | End: 2019-02-18
Payer: COMMERCIAL

## 2019-02-18 ENCOUNTER — OFFICE VISIT (OUTPATIENT)
Dept: CARDIOLOGY | Facility: CLINIC | Age: 64
End: 2019-02-18
Payer: COMMERCIAL

## 2019-02-18 VITALS
BODY MASS INDEX: 38.56 KG/M2 | DIASTOLIC BLOOD PRESSURE: 96 MMHG | OXYGEN SATURATION: 94 % | HEIGHT: 60 IN | HEART RATE: 88 BPM | WEIGHT: 196.44 LBS | SYSTOLIC BLOOD PRESSURE: 165 MMHG

## 2019-02-18 DIAGNOSIS — I10 ESSENTIAL HYPERTENSION: ICD-10-CM

## 2019-02-18 DIAGNOSIS — Z91.89 AT RISK FOR CORONARY ARTERY DISEASE: ICD-10-CM

## 2019-02-18 DIAGNOSIS — E66.9 OBESITY (BMI 30-39.9): ICD-10-CM

## 2019-02-18 DIAGNOSIS — R01.1 HEART MURMUR: ICD-10-CM

## 2019-02-18 DIAGNOSIS — E87.6 HYPOKALEMIA: ICD-10-CM

## 2019-02-18 DIAGNOSIS — I35.0 NONRHEUMATIC AORTIC VALVE STENOSIS: ICD-10-CM

## 2019-02-18 DIAGNOSIS — R00.2 PALPITATIONS: ICD-10-CM

## 2019-02-18 DIAGNOSIS — R06.09 DYSPNEA ON EXERTION: ICD-10-CM

## 2019-02-18 DIAGNOSIS — M54.40 CHRONIC LOW BACK PAIN WITH SCIATICA, SCIATICA LATERALITY UNSPECIFIED, UNSPECIFIED BACK PAIN LATERALITY: ICD-10-CM

## 2019-02-18 DIAGNOSIS — G89.29 CHRONIC LOW BACK PAIN WITH SCIATICA, SCIATICA LATERALITY UNSPECIFIED, UNSPECIFIED BACK PAIN LATERALITY: ICD-10-CM

## 2019-02-18 DIAGNOSIS — E78.2 MIXED HYPERLIPIDEMIA: Primary | ICD-10-CM

## 2019-02-18 DIAGNOSIS — E89.0 S/P THYROIDECTOMY: ICD-10-CM

## 2019-02-18 PROCEDURE — 99204 OFFICE O/P NEW MOD 45 MIN: CPT | Mod: S$GLB,,, | Performed by: INTERNAL MEDICINE

## 2019-02-18 PROCEDURE — 93000 ELECTROCARDIOGRAM COMPLETE: CPT | Mod: S$GLB,,, | Performed by: INTERNAL MEDICINE

## 2019-02-18 PROCEDURE — 99999 PR PBB SHADOW E&M-EST. PATIENT-LVL IV: ICD-10-PCS | Mod: PBBFAC,,, | Performed by: INTERNAL MEDICINE

## 2019-02-18 PROCEDURE — 99204 PR OFFICE/OUTPT VISIT, NEW, LEVL IV, 45-59 MIN: ICD-10-PCS | Mod: S$GLB,,, | Performed by: INTERNAL MEDICINE

## 2019-02-18 PROCEDURE — 93000 EKG 12-LEAD: ICD-10-PCS | Mod: S$GLB,,, | Performed by: INTERNAL MEDICINE

## 2019-02-18 PROCEDURE — 99999 PR PBB SHADOW E&M-EST. PATIENT-LVL IV: CPT | Mod: PBBFAC,,, | Performed by: INTERNAL MEDICINE

## 2019-02-18 RX ORDER — ME-TETRAHYDROFOLATE/B12/HRB236 1-1-500 MG
1 CAPSULE ORAL DAILY
Refills: 12 | COMMUNITY
Start: 2019-01-16

## 2019-02-18 RX ORDER — METHOTREXATE 2.5 MG/1
TABLET ORAL
COMMUNITY
Start: 2019-01-28 | End: 2019-06-20

## 2019-02-18 RX ORDER — TRIAMTERENE/HYDROCHLOROTHIAZID 37.5-25 MG
1 TABLET ORAL DAILY
Qty: 90 TABLET | Refills: 3 | Status: SHIPPED | OUTPATIENT
Start: 2019-02-18 | End: 2019-06-21 | Stop reason: SDUPTHER

## 2019-02-18 RX ORDER — DEXTROAMPHETAMINE SACCHARATE, AMPHETAMINE ASPARTATE, DEXTROAMPHETAMINE SULFATE AND AMPHETAMINE SULFATE 7.5; 7.5; 7.5; 7.5 MG/1; MG/1; MG/1; MG/1
30 TABLET ORAL DAILY
COMMUNITY
Start: 2019-01-11

## 2019-02-18 RX ORDER — ERGOCALCIFEROL 1.25 MG/1
50000 CAPSULE ORAL
COMMUNITY
Start: 2019-01-07

## 2019-02-18 RX ORDER — LOSARTAN POTASSIUM 100 MG/1
100 TABLET ORAL DAILY
Qty: 90 TABLET | Refills: 3 | Status: SHIPPED | OUTPATIENT
Start: 2019-02-18 | End: 2019-06-21 | Stop reason: SDUPTHER

## 2019-02-18 NOTE — PATIENT INSTRUCTIONS
Discussed diet , achieving and maintaining ideal body weight, and exercise.   We reviewed meds in detail.  Reassured-discussed goals, options, plan.  Discussed CAC scanning.  Double losartan to 100 mg 2 50s or one 100  Add HCTZ/Triamterene 25/37.5 just half in am

## 2019-03-04 ENCOUNTER — HOSPITAL ENCOUNTER (OUTPATIENT)
Dept: RADIOLOGY | Facility: HOSPITAL | Age: 64
Discharge: HOME | End: 2019-03-04
Attending: INTERNAL MEDICINE
Payer: COMMERCIAL

## 2019-03-04 DIAGNOSIS — I10 ESSENTIAL HYPERTENSION: ICD-10-CM

## 2019-03-04 DIAGNOSIS — Z91.89 AT RISK FOR CORONARY ARTERY DISEASE: ICD-10-CM

## 2019-03-04 DIAGNOSIS — E66.9 OBESITY (BMI 30-39.9): ICD-10-CM

## 2019-03-04 DIAGNOSIS — E87.6 HYPOKALEMIA: ICD-10-CM

## 2019-03-04 DIAGNOSIS — E78.2 MIXED HYPERLIPIDEMIA: ICD-10-CM

## 2019-03-04 PROBLEM — R93.1 AGATSTON CAC SCORE 200-399: Status: ACTIVE | Noted: 2019-03-04

## 2019-03-04 PROCEDURE — 75571 CT HRT W/O DYE W/CA TEST: CPT | Mod: TC

## 2019-03-04 PROCEDURE — 75571 CT HRT W/O DYE W/CA TEST: CPT | Mod: 26,,, | Performed by: RADIOLOGY

## 2019-03-04 PROCEDURE — 75571 CT CALCIUM SCORING CARDIAC: ICD-10-PCS | Mod: 26,,, | Performed by: RADIOLOGY

## 2019-04-04 ENCOUNTER — TELEPHONE (OUTPATIENT)
Dept: CARDIOLOGY | Facility: CLINIC | Age: 64
End: 2019-04-04

## 2019-04-04 DIAGNOSIS — I10 ESSENTIAL HYPERTENSION: Primary | ICD-10-CM

## 2019-04-04 DIAGNOSIS — E78.5 HYPERLIPIDEMIA, UNSPECIFIED HYPERLIPIDEMIA TYPE: Primary | ICD-10-CM

## 2019-04-04 DIAGNOSIS — I35.0 AORTIC VALVE STENOSIS, ETIOLOGY OF CARDIAC VALVE DISEASE UNSPECIFIED: ICD-10-CM

## 2019-04-04 DIAGNOSIS — R93.1 AGATSTON CAC SCORE 200-399: ICD-10-CM

## 2019-04-04 RX ORDER — ATORVASTATIN CALCIUM 40 MG/1
40 TABLET, FILM COATED ORAL DAILY
COMMUNITY
End: 2019-09-17

## 2019-04-25 ENCOUNTER — TELEPHONE (OUTPATIENT)
Dept: CARDIOLOGY | Facility: CLINIC | Age: 64
End: 2019-04-25

## 2019-04-25 NOTE — TELEPHONE ENCOUNTER
Returned patient's call but no answer. Left message on voicemail.    Jennifer GIRON Staff   Caller: Self (Today,  9:05 AM)             .Needs Advice     Reason for call: Della could you call Pt Pt has question about her ECHO tomorrow.          Communication Preference: [phone]     Additional Information

## 2019-04-26 ENCOUNTER — TELEPHONE (OUTPATIENT)
Dept: CARDIOLOGY | Facility: CLINIC | Age: 64
End: 2019-04-26

## 2019-04-26 ENCOUNTER — HOSPITAL ENCOUNTER (OUTPATIENT)
Dept: CARDIOLOGY | Facility: CLINIC | Age: 64
Discharge: HOME | End: 2019-04-26
Attending: INTERNAL MEDICINE
Payer: COMMERCIAL

## 2019-04-26 VITALS — HEIGHT: 60 IN | WEIGHT: 200 LBS | BODY MASS INDEX: 39.27 KG/M2

## 2019-04-26 DIAGNOSIS — I35.0 AORTIC VALVE STENOSIS, ETIOLOGY OF CARDIAC VALVE DISEASE UNSPECIFIED: ICD-10-CM

## 2019-04-26 DIAGNOSIS — I10 ESSENTIAL HYPERTENSION: ICD-10-CM

## 2019-04-26 DIAGNOSIS — R93.1 AGATSTON CAC SCORE 200-399: ICD-10-CM

## 2019-04-26 LAB
ASCENDING AORTA: 2.99 CM
AV INDEX (PROSTH): 0.69
AV MEAN GRADIENT: 5.75 MMHG
AV PEAK GRADIENT: 10.24 MMHG
AV VALVE AREA: 2.1 CM2
AV VELOCITY RATIO: 0.64
BSA FOR ECHO PROCEDURE: 1.96 M2
CV ECHO LV RWT: 0.31 CM
CV STRESS BASE HR: 69 BPM
DIASTOLIC BLOOD PRESSURE: 95 MMHG
DOP CALC AO PEAK VEL: 1.6 M/S
DOP CALC AO VTI: 37.66 CM
DOP CALC LVOT AREA: 3.05 CM2
DOP CALC LVOT DIAMETER: 1.97 CM
DOP CALC LVOT PEAK VEL: 1.03 M/S
DOP CALC LVOT STROKE VOLUME: 78.9 CM3
DOP CALCLVOT PEAK VEL VTI: 25.9 CM
E WAVE DECELERATION TIME: 185.72 MSEC
E/A RATIO: 0.85
E/E' RATIO: 9.22
ECHO LV POSTERIOR WALL: 0.66 CM (ref 0.6–1.1)
FRACTIONAL SHORTENING: 41 % (ref 28–44)
INTERVENTRICULAR SEPTUM: 0.94 CM (ref 0.6–1.1)
LA MAJOR: 4.72 CM
LA MINOR: 4.56 CM
LA WIDTH: 3.6 CM
LEFT ATRIUM SIZE: 3.26 CM
LEFT ATRIUM VOLUME INDEX: 24.8 ML/M2
LEFT ATRIUM VOLUME: 46.27 CM3
LEFT INTERNAL DIMENSION IN SYSTOLE: 2.54 CM (ref 2.1–4)
LEFT VENTRICLE DIASTOLIC VOLUME INDEX: 43.79 ML/M2
LEFT VENTRICLE DIASTOLIC VOLUME: 81.73 ML
LEFT VENTRICLE MASS INDEX: 55.8 G/M2
LEFT VENTRICLE SYSTOLIC VOLUME INDEX: 12.4 ML/M2
LEFT VENTRICLE SYSTOLIC VOLUME: 23.21 ML
LEFT VENTRICULAR INTERNAL DIMENSION IN DIASTOLE: 4.27 CM (ref 3.5–6)
LEFT VENTRICULAR MASS: 104.11 G
LV LATERAL E/E' RATIO: 6.92
LV SEPTAL E/E' RATIO: 13.83
MV PEAK A VEL: 0.98 M/S
MV PEAK E VEL: 0.83 M/S
OHS CV CPX 1 MINUTE RECOVERY HEART RATE: 108 BPM
OHS CV CPX 85 PERCENT MAX PREDICTED HEART RATE MALE: 129
OHS CV CPX ESTIMATED METS: 7
OHS CV CPX MAX PREDICTED HEART RATE: 151
OHS CV CPX PATIENT IS FEMALE: 1
OHS CV CPX PATIENT IS MALE: 0
OHS CV CPX PEAK DIASTOLIC BLOOD PRESSURE: 95 MMHG
OHS CV CPX PEAK HEAR RATE: 127 BPM
OHS CV CPX PEAK RATE PRESSURE PRODUCT: NORMAL
OHS CV CPX PEAK SYSTOLIC BLOOD PRESSURE: 230 MMHG
OHS CV CPX PERCENT MAX PREDICTED HEART RATE ACHIEVED: 84
OHS CV CPX PERCENT TARGET HEART RATE ACHIEVED: 98.45
OHS CV CPX RATE PRESSURE PRODUCT PRESENTING: 9246
OHS CV CPX TARGET HEART RATE: 129
RA MAJOR: 3.59 CM
RA PRESSURE: 3 MMHG
RA WIDTH: 1.9 CM
RIGHT VENTRICULAR END-DIASTOLIC DIMENSION: 1.96 CM
SINUS: 3 CM
STJ: 2.79 CM
STRESS ECHO POST EXERCISE DUR MIN: 4 MIN
STRESS ECHO POST EXERCISE DUR SEC: 44
STRESS ST DEPRESSION: 0.5 MM
SYSTOLIC BLOOD PRESSURE: 134 MMHG
TDI LATERAL: 0.12
TDI SEPTAL: 0.06
TDI: 0.09
TRICUSPID ANNULAR PLANE SYSTOLIC EXCURSION: 1.59 CM

## 2019-04-26 PROCEDURE — 93351 ECHOCARDIOGRAM STRESS TEST WITH COLOR FLOW DOPPLER (CUPID ONLY): ICD-10-PCS | Mod: S$GLB,,, | Performed by: INTERNAL MEDICINE

## 2019-04-26 PROCEDURE — 93351 STRESS TTE COMPLETE: CPT | Mod: S$GLB,,, | Performed by: INTERNAL MEDICINE

## 2019-04-26 RX ORDER — AMLODIPINE BESYLATE 5 MG/1
5 TABLET ORAL NIGHTLY
COMMUNITY
End: 2019-04-26 | Stop reason: SDUPTHER

## 2019-04-26 RX ORDER — AMLODIPINE BESYLATE 5 MG/1
5 TABLET ORAL NIGHTLY
Qty: 30 TABLET | Refills: 11 | Status: SHIPPED | OUTPATIENT
Start: 2019-04-26 | End: 2019-05-28 | Stop reason: DRUGHIGH

## 2019-05-10 ENCOUNTER — TELEPHONE (OUTPATIENT)
Dept: CARDIOLOGY | Facility: CLINIC | Age: 64
End: 2019-05-10

## 2019-05-10 NOTE — TELEPHONE ENCOUNTER
----- Message from Akua Ngo sent at 5/10/2019 11:39 AM CDT -----  Contact: Pt called   Pt would like for you to give her a call. Please call pt @ [phone]. Thank you.

## 2019-05-10 NOTE — TELEPHONE ENCOUNTER
"Returned patient's call but no answer. Her "mailbox was full " and could not leave a message. Will call again.    Akua GIRON Staff   Caller: Pt called (Today, 11:39 AM)             Pt would like for you to give her a call. Please call pt @ [phone]. Thank you.        "

## 2019-05-13 ENCOUNTER — TELEPHONE (OUTPATIENT)
Dept: CARDIOLOGY | Facility: CLINIC | Age: 64
End: 2019-05-13

## 2019-05-13 NOTE — TELEPHONE ENCOUNTER
Returned patient's call . She will fax most recent b/p readings to .    Akua GIRON Staff   Caller: Pt called (Today, 12:05 PM)             Pt returning call. Please call pt @ [phone]. Thank you.

## 2019-05-13 NOTE — TELEPHONE ENCOUNTER
----- Message from Akua Ngo sent at 5/13/2019 12:05 PM CDT -----  Contact: Pt called   Pt returning call. Please call pt @ [phone]. Thank you.

## 2019-05-22 ENCOUNTER — PATIENT MESSAGE (OUTPATIENT)
Dept: CARDIOLOGY | Facility: CLINIC | Age: 64
End: 2019-05-22

## 2019-05-24 ENCOUNTER — TELEPHONE (OUTPATIENT)
Dept: CARDIOLOGY | Facility: CLINIC | Age: 64
End: 2019-05-24

## 2019-05-24 NOTE — TELEPHONE ENCOUNTER
Patient sent list of b/ps to . Most readings were high ( in the 150/80 - 166/94 range ).  instructed that patient increase HCTZ to a whole pill every day ( patient stated that she had been taking it every other day ) . He also increased Amlodipine from 5mg to 10 mg nightly and added Spironolactone 25 mg daily. New Rx for Amlodipine 10 and Spironolactone were called in to [facility] ( [phone]). Patient is scheduled for lab 6/11/19. She will continue to monitor b/p.

## 2019-05-28 ENCOUNTER — PATIENT MESSAGE (OUTPATIENT)
Dept: CARDIOLOGY | Facility: CLINIC | Age: 64
End: 2019-05-28

## 2019-05-28 RX ORDER — AMLODIPINE BESYLATE 10 MG/1
10 TABLET ORAL NIGHTLY
COMMUNITY
End: 2019-06-21 | Stop reason: HOSPADM

## 2019-05-28 RX ORDER — SPIRONOLACTONE 25 MG/1
25 TABLET ORAL DAILY
COMMUNITY
End: 2019-06-20

## 2019-05-29 ENCOUNTER — PATIENT MESSAGE (OUTPATIENT)
Dept: CARDIOLOGY | Facility: CLINIC | Age: 64
End: 2019-05-29

## 2019-06-03 ENCOUNTER — TELEPHONE (OUTPATIENT)
Dept: CARDIOLOGY | Facility: CLINIC | Age: 64
End: 2019-06-03

## 2019-06-03 ENCOUNTER — PATIENT MESSAGE (OUTPATIENT)
Dept: CARDIOLOGY | Facility: CLINIC | Age: 64
End: 2019-06-03

## 2019-06-03 NOTE — TELEPHONE ENCOUNTER
Patient called to report that she thinks her b/p machine is old and inaccurate. She bought another one just a couple of hours ago and when she checked her b/p with the new one, b/p is 124/76 and pulse is 50.

## 2019-06-04 ENCOUNTER — PATIENT MESSAGE (OUTPATIENT)
Dept: CARDIOLOGY | Facility: CLINIC | Age: 64
End: 2019-06-04

## 2019-06-05 ENCOUNTER — PATIENT MESSAGE (OUTPATIENT)
Dept: CARDIOLOGY | Facility: CLINIC | Age: 64
End: 2019-06-05

## 2019-06-13 ENCOUNTER — PATIENT MESSAGE (OUTPATIENT)
Dept: CARDIOLOGY | Facility: CLINIC | Age: 64
End: 2019-06-13

## 2019-06-13 DIAGNOSIS — I10 ESSENTIAL HYPERTENSION: Primary | ICD-10-CM

## 2019-06-14 RX ORDER — CARVEDILOL 12.5 MG/1
12.5 TABLET ORAL 2 TIMES DAILY
COMMUNITY
End: 2019-06-14 | Stop reason: SDUPTHER

## 2019-06-14 RX ORDER — CARVEDILOL 12.5 MG/1
12.5 TABLET ORAL 2 TIMES DAILY
Qty: 60 TABLET | Refills: 6 | OUTPATIENT
Start: 2019-06-14 | End: 2019-06-21 | Stop reason: HOSPADM

## 2019-06-14 NOTE — TELEPHONE ENCOUNTER
----- Message from Dianna Maynard sent at 6/14/2019  1:42 PM CDT -----  Contact: pt  Pt returning your missed call and she states that the Rx for Coreg was never received by the pharmacy.    Thanks

## 2019-06-20 ENCOUNTER — PATIENT MESSAGE (OUTPATIENT)
Dept: CARDIOLOGY | Facility: CLINIC | Age: 64
End: 2019-06-20

## 2019-06-20 ENCOUNTER — HOSPITAL ENCOUNTER (INPATIENT)
Facility: HOSPITAL | Age: 64
LOS: 1 days | Discharge: HOME | DRG: 918 | End: 2019-06-21
Attending: EMERGENCY MEDICINE | Admitting: EMERGENCY MEDICINE
Payer: COMMERCIAL

## 2019-06-20 DIAGNOSIS — R53.1 WEAKNESS: ICD-10-CM

## 2019-06-20 DIAGNOSIS — R00.1 BRADYCARDIA: ICD-10-CM

## 2019-06-20 DIAGNOSIS — R55 SYNCOPE: ICD-10-CM

## 2019-06-20 DIAGNOSIS — I95.2 HYPOTENSION DUE TO DRUGS: Primary | ICD-10-CM

## 2019-06-20 DIAGNOSIS — I10 ESSENTIAL HYPERTENSION: ICD-10-CM

## 2019-06-20 PROBLEM — I95.0 IDIOPATHIC HYPOTENSION: Status: ACTIVE | Noted: 2019-06-20

## 2019-06-20 LAB
ALBUMIN SERPL BCP-MCNC: 3.2 G/DL (ref 3.5–5.2)
ALP SERPL-CCNC: 81 U/L (ref 55–135)
ALT SERPL W/O P-5'-P-CCNC: 13 U/L (ref 10–44)
ANION GAP SERPL CALC-SCNC: 10 MMOL/L (ref 8–16)
APTT BLDCRRT: 22.4 SEC (ref 21–32)
AST SERPL-CCNC: 16 U/L (ref 10–40)
BASOPHILS # BLD AUTO: 0.05 K/UL (ref 0–0.2)
BASOPHILS NFR BLD: 0.6 % (ref 0–1.9)
BILIRUB SERPL-MCNC: 0.6 MG/DL (ref 0.1–1)
BILIRUB UR QL STRIP: NEGATIVE
BNP SERPL-MCNC: 113 PG/ML (ref 0–99)
BUN SERPL-MCNC: 27 MG/DL (ref 8–23)
CALCIUM SERPL-MCNC: 8.5 MG/DL (ref 8.7–10.5)
CHLORIDE SERPL-SCNC: 102 MMOL/L (ref 95–110)
CHOLEST SERPL-MCNC: 139 MG/DL (ref 120–199)
CHOLEST/HDLC SERPL: 3.3 {RATIO} (ref 2–5)
CLARITY UR REFRACT.AUTO: CLEAR
CO2 SERPL-SCNC: 24 MMOL/L (ref 23–29)
COLOR UR AUTO: YELLOW
CREAT SERPL-MCNC: 1.3 MG/DL (ref 0.5–1.4)
DIFFERENTIAL METHOD: ABNORMAL
EOSINOPHIL # BLD AUTO: 0.1 K/UL (ref 0–0.5)
EOSINOPHIL NFR BLD: 1.6 % (ref 0–8)
ERYTHROCYTE [DISTWIDTH] IN BLOOD BY AUTOMATED COUNT: 13.7 % (ref 11.5–14.5)
EST. GFR  (AFRICAN AMERICAN): 50.8 ML/MIN/1.73 M^2
EST. GFR  (NON AFRICAN AMERICAN): 44.1 ML/MIN/1.73 M^2
GLUCOSE SERPL-MCNC: 84 MG/DL (ref 70–110)
GLUCOSE UR QL STRIP: NEGATIVE
HCT VFR BLD AUTO: 30.5 % (ref 37–48.5)
HDLC SERPL-MCNC: 42 MG/DL (ref 40–75)
HDLC SERPL: 30.2 % (ref 20–50)
HGB BLD-MCNC: 10 G/DL (ref 12–16)
HGB UR QL STRIP: NEGATIVE
IMM GRANULOCYTES # BLD AUTO: 0.05 K/UL (ref 0–0.04)
IMM GRANULOCYTES NFR BLD AUTO: 0.6 % (ref 0–0.5)
INR PPP: 0.9 (ref 0.8–1.2)
KETONES UR QL STRIP: NEGATIVE
LACTATE SERPL-SCNC: 1.2 MMOL/L (ref 0.5–2.2)
LDLC SERPL CALC-MCNC: 53 MG/DL (ref 63–159)
LEUKOCYTE ESTERASE UR QL STRIP: NEGATIVE
LYMPHOCYTES # BLD AUTO: 2.2 K/UL (ref 1–4.8)
LYMPHOCYTES NFR BLD: 26.7 % (ref 18–48)
MCH RBC QN AUTO: 30 PG (ref 27–31)
MCHC RBC AUTO-ENTMCNC: 32.8 G/DL (ref 32–36)
MCV RBC AUTO: 92 FL (ref 82–98)
MONOCYTES # BLD AUTO: 0.9 K/UL (ref 0.3–1)
MONOCYTES NFR BLD: 10.6 % (ref 4–15)
NEUTROPHILS # BLD AUTO: 4.9 K/UL (ref 1.8–7.7)
NEUTROPHILS NFR BLD: 59.9 % (ref 38–73)
NITRITE UR QL STRIP: NEGATIVE
NONHDLC SERPL-MCNC: 97 MG/DL
NRBC BLD-RTO: 0 /100 WBC
PH UR STRIP: 6 [PH] (ref 5–8)
PLATELET # BLD AUTO: 264 K/UL (ref 150–350)
PMV BLD AUTO: 9.9 FL (ref 9.2–12.9)
POTASSIUM SERPL-SCNC: 3.9 MMOL/L (ref 3.5–5.1)
PROT SERPL-MCNC: 5.7 G/DL (ref 6–8.4)
PROT UR QL STRIP: NEGATIVE
PROTHROMBIN TIME: 9.9 SEC (ref 9–12.5)
RBC # BLD AUTO: 3.33 M/UL (ref 4–5.4)
SODIUM SERPL-SCNC: 136 MMOL/L (ref 136–145)
SP GR UR STRIP: 1.01 (ref 1–1.03)
TRIGL SERPL-MCNC: 220 MG/DL (ref 30–150)
TROPONIN I SERPL DL<=0.01 NG/ML-MCNC: <0.006 NG/ML (ref 0–0.03)
TROPONIN I SERPL DL<=0.01 NG/ML-MCNC: <0.006 NG/ML (ref 0–0.03)
URN SPEC COLLECT METH UR: NORMAL
WBC # BLD AUTO: 8.18 K/UL (ref 3.9–12.7)

## 2019-06-20 PROCEDURE — 81003 URINALYSIS AUTO W/O SCOPE: CPT

## 2019-06-20 PROCEDURE — 84484 ASSAY OF TROPONIN QUANT: CPT | Mod: 91

## 2019-06-20 PROCEDURE — 83605 ASSAY OF LACTIC ACID: CPT

## 2019-06-20 PROCEDURE — 93010 EKG 12-LEAD: ICD-10-PCS | Mod: ,,, | Performed by: INTERNAL MEDICINE

## 2019-06-20 PROCEDURE — 85610 PROTHROMBIN TIME: CPT

## 2019-06-20 PROCEDURE — 96374 THER/PROPH/DIAG INJ IV PUSH: CPT

## 2019-06-20 PROCEDURE — 25000003 PHARM REV CODE 250: Performed by: STUDENT IN AN ORGANIZED HEALTH CARE EDUCATION/TRAINING PROGRAM

## 2019-06-20 PROCEDURE — 83880 ASSAY OF NATRIURETIC PEPTIDE: CPT

## 2019-06-20 PROCEDURE — 87040 BLOOD CULTURE FOR BACTERIA: CPT

## 2019-06-20 PROCEDURE — 85730 THROMBOPLASTIN TIME PARTIAL: CPT

## 2019-06-20 PROCEDURE — 99223 PR INITIAL HOSPITAL CARE,LEVL III: ICD-10-PCS | Mod: ,,, | Performed by: HOSPITALIST

## 2019-06-20 PROCEDURE — 25000003 PHARM REV CODE 250: Performed by: EMERGENCY MEDICINE

## 2019-06-20 PROCEDURE — 96372 THER/PROPH/DIAG INJ SC/IM: CPT

## 2019-06-20 PROCEDURE — 96361 HYDRATE IV INFUSION ADD-ON: CPT

## 2019-06-20 PROCEDURE — 99291 CRITICAL CARE FIRST HOUR: CPT | Mod: ,,, | Performed by: EMERGENCY MEDICINE

## 2019-06-20 PROCEDURE — 82962 GLUCOSE BLOOD TEST: CPT

## 2019-06-20 PROCEDURE — 80053 COMPREHEN METABOLIC PANEL: CPT

## 2019-06-20 PROCEDURE — 63600175 PHARM REV CODE 636 W HCPCS: Performed by: STUDENT IN AN ORGANIZED HEALTH CARE EDUCATION/TRAINING PROGRAM

## 2019-06-20 PROCEDURE — 99285 EMERGENCY DEPT VISIT HI MDM: CPT

## 2019-06-20 PROCEDURE — 93005 ELECTROCARDIOGRAM TRACING: CPT

## 2019-06-20 PROCEDURE — 63600175 PHARM REV CODE 636 W HCPCS: Performed by: EMERGENCY MEDICINE

## 2019-06-20 PROCEDURE — 93010 ELECTROCARDIOGRAM REPORT: CPT | Mod: ,,, | Performed by: INTERNAL MEDICINE

## 2019-06-20 PROCEDURE — 80061 LIPID PANEL: CPT

## 2019-06-20 PROCEDURE — 85025 COMPLETE CBC W/AUTO DIFF WBC: CPT

## 2019-06-20 PROCEDURE — 99223 1ST HOSP IP/OBS HIGH 75: CPT | Mod: ,,, | Performed by: HOSPITALIST

## 2019-06-20 PROCEDURE — 84484 ASSAY OF TROPONIN QUANT: CPT

## 2019-06-20 PROCEDURE — 99291 PR CRITICAL CARE, E/M 30-74 MINUTES: ICD-10-PCS | Mod: ,,, | Performed by: EMERGENCY MEDICINE

## 2019-06-20 PROCEDURE — 12000002 HC ACUTE/MED SURGE SEMI-PRIVATE ROOM

## 2019-06-20 RX ORDER — CLONAZEPAM 0.5 MG/1
2 TABLET ORAL 2 TIMES DAILY PRN
Status: DISCONTINUED | OUTPATIENT
Start: 2019-06-20 | End: 2019-06-21 | Stop reason: HOSPADM

## 2019-06-20 RX ORDER — ONDANSETRON 8 MG/1
8 TABLET, ORALLY DISINTEGRATING ORAL EVERY 8 HOURS PRN
Status: DISCONTINUED | OUTPATIENT
Start: 2019-06-20 | End: 2019-06-21 | Stop reason: HOSPADM

## 2019-06-20 RX ORDER — AZATHIOPRINE 50 MG/1
50 TABLET ORAL DAILY
COMMUNITY
End: 2020-10-16

## 2019-06-20 RX ORDER — ENOXAPARIN SODIUM 100 MG/ML
40 INJECTION SUBCUTANEOUS EVERY 24 HOURS
Status: DISCONTINUED | OUTPATIENT
Start: 2019-06-20 | End: 2019-06-21 | Stop reason: HOSPADM

## 2019-06-20 RX ORDER — AZATHIOPRINE 50 MG/1
100 TABLET ORAL DAILY
Status: DISCONTINUED | OUTPATIENT
Start: 2019-06-21 | End: 2019-06-21

## 2019-06-20 RX ORDER — ATROPINE SULFATE 0.1 MG/ML
0.5 INJECTION INTRAVENOUS EVERY 5 MIN PRN
Status: DISCONTINUED | OUTPATIENT
Start: 2019-06-20 | End: 2019-06-21 | Stop reason: HOSPADM

## 2019-06-20 RX ORDER — SODIUM CHLORIDE 0.9 % (FLUSH) 0.9 %
10 SYRINGE (ML) INJECTION
Status: DISCONTINUED | OUTPATIENT
Start: 2019-06-20 | End: 2019-06-21 | Stop reason: HOSPADM

## 2019-06-20 RX ORDER — TIZANIDINE 4 MG/1
4 TABLET ORAL EVERY 6 HOURS PRN
COMMUNITY
End: 2019-06-21 | Stop reason: HOSPADM

## 2019-06-20 RX ORDER — TRAMADOL HYDROCHLORIDE 50 MG/1
50 TABLET ORAL EVERY 6 HOURS PRN
COMMUNITY
End: 2019-07-02

## 2019-06-20 RX ORDER — IBUPROFEN 200 MG
24 TABLET ORAL
Status: DISCONTINUED | OUTPATIENT
Start: 2019-06-20 | End: 2019-06-21 | Stop reason: HOSPADM

## 2019-06-20 RX ORDER — SODIUM CHLORIDE 0.9 % (FLUSH) 0.9 %
10 SYRINGE (ML) INJECTION
Status: CANCELLED | OUTPATIENT
Start: 2019-06-20

## 2019-06-20 RX ORDER — GABAPENTIN 300 MG/1
300 CAPSULE ORAL 3 TIMES DAILY
Status: DISCONTINUED | OUTPATIENT
Start: 2019-06-20 | End: 2019-06-21 | Stop reason: HOSPADM

## 2019-06-20 RX ORDER — ATROPINE SULFATE 0.1 MG/ML
0.5 INJECTION INTRAVENOUS
Status: COMPLETED | OUTPATIENT
Start: 2019-06-20 | End: 2019-06-20

## 2019-06-20 RX ORDER — GABAPENTIN 300 MG/1
300 CAPSULE ORAL 3 TIMES DAILY
COMMUNITY
End: 2019-07-02

## 2019-06-20 RX ORDER — ACETAMINOPHEN 325 MG/1
650 TABLET ORAL EVERY 4 HOURS PRN
Status: DISCONTINUED | OUTPATIENT
Start: 2019-06-20 | End: 2019-06-21 | Stop reason: HOSPADM

## 2019-06-20 RX ORDER — LEVOTHYROXINE SODIUM 100 UG/1
100 TABLET ORAL
COMMUNITY
End: 2020-10-16

## 2019-06-20 RX ORDER — GLUCAGON 1 MG
1 KIT INJECTION
Status: DISCONTINUED | OUTPATIENT
Start: 2019-06-20 | End: 2019-06-21 | Stop reason: HOSPADM

## 2019-06-20 RX ORDER — IBUPROFEN 200 MG
16 TABLET ORAL
Status: DISCONTINUED | OUTPATIENT
Start: 2019-06-20 | End: 2019-06-21 | Stop reason: HOSPADM

## 2019-06-20 RX ORDER — PAROXETINE 10 MG/1
20 TABLET, FILM COATED ORAL DAILY
Status: DISCONTINUED | OUTPATIENT
Start: 2019-06-21 | End: 2019-06-21

## 2019-06-20 RX ORDER — ATORVASTATIN CALCIUM 10 MG/1
40 TABLET, FILM COATED ORAL DAILY
Status: DISCONTINUED | OUTPATIENT
Start: 2019-06-21 | End: 2019-06-21 | Stop reason: HOSPADM

## 2019-06-20 RX ADMIN — ENOXAPARIN SODIUM 40 MG: 100 INJECTION SUBCUTANEOUS at 06:06

## 2019-06-20 RX ADMIN — SODIUM CHLORIDE 1000 ML: 0.9 INJECTION, SOLUTION INTRAVENOUS at 02:06

## 2019-06-20 RX ADMIN — ACETAMINOPHEN 650 MG: 325 TABLET ORAL at 06:06

## 2019-06-20 RX ADMIN — ATROPINE SULFATE 0.5 MG: 0.1 INJECTION PARENTERAL at 02:06

## 2019-06-20 RX ADMIN — ACETAMINOPHEN 650 MG: 325 TABLET ORAL at 10:06

## 2019-06-20 RX ADMIN — ATROPINE SULFATE 0.5 MG: 0.1 INJECTION PARENTERAL at 06:06

## 2019-06-20 RX ADMIN — CLONAZEPAM 2 MG: 0.5 TABLET ORAL at 10:06

## 2019-06-20 RX ADMIN — GABAPENTIN 300 MG: 300 CAPSULE ORAL at 09:06

## 2019-06-20 NOTE — ASSESSMENT & PLAN NOTE
- Patient's heart rate is normally in the 50's. She had heart rates as low as 40  - Received .5 mg of atropine in ED, ordered additional dose of atropine   - On telemetry, ordered atropine prn to be used in case of symptomatic bradycardia  - Nursing communication to keep pacer pads on the patient

## 2019-06-20 NOTE — ED TRIAGE NOTES
Pt came from work via EMS with . Pt cc fall while at work and slurred speech. EMS report pt was hypotensive and bradycardic. Pt reports not feeling like she was going to fall at the time. Pt denies hitting head. Pt reports falls in the passed couple of days but no head injury. Pt denies sob at this time, chest pain, n/v/d , or changes in urinary habits. Pt aaoX4 and ambulatory with steady gate. Pt reports heart rate normally in the 50's.          Psychosocial:  Patient is calm and cooperative.  Patients insight and judgement are appropriate to situation.  Appears clean, well maintained, with clothing appropriate to environment.  No evidence of delusions, hallucinations, or psychosis.     Neuro:  Eyes open spontaneously.  Awake, alert, oriented x 4.  Speech clear and appropriate.  Tolerating saliva secretions well.  Able to follow commands, demonstrating ability to actively and appropriately communicate within context of current conversation.  Symmetrical facial muscles.  Moving all extremities well with no noted weakness.  Adequate muscle tone present.         Airway:  Bilateral chest rise and fall.  RR regular and non-labored.  Air entry patent with and clear x 5 lobes of the lungs.  No crepitus or subcutaneous emphysema noted on palpation.       Circulatory:  Skin warm, dry, and pink.  Apical and radial pulses strong and regular.  Capillary refill/skin blanching less than 3 seconds to distal of 4 extremities.     Abdomen:  Abdomen soft and non-distended.  Positive normo-active bowel sounds x 4 quadrants.       Urinary: Denies pressure, frequency, urgency, odor or pain.     Extremities:  No redness, heat, deformity, or pain.     Skin:  Intact with no bruising/discolorations noted.

## 2019-06-20 NOTE — ASSESSMENT & PLAN NOTE
- See Drug induced hypotension  - Holding all HTN meds inpatient  - Will require follow up with cardiology/pcp to come up with new HTN regimen

## 2019-06-20 NOTE — ASSESSMENT & PLAN NOTE
- Patient is on multiple blood pressure medications at home due to refractory HTN  - Symptoms started after starting carvedilol and previous episodes of hypotensive symptoms appear to have started after accidentally taking double her usual medication  - At home, on triamterene-hydrochlorothiazide, spironolactone, losartan, amlodipine, and carvedilol: holding all HTN medications here  - Treated with 1 L saline bolus, will give additional fluid as needed  - See Syncope

## 2019-06-20 NOTE — ASSESSMENT & PLAN NOTE
- Patient has had multiple syncopal episodes in the past, several of which have resulted in falls  - Although these appear to be correlated to her HTN medications, differential diagnosis includes carotid artery stenosis, heart failure, CAD, hypothyroidism, hypoglycemia.  - Will get carotid ultrasound, TSH, lipid panel, HA1c, echocardiogram, and check glucose q2 for now  - Also want to rule out possibility of TIA or ischemic stroke either now or in the past. Will get CT head without contrast, and MRI brain tomorrow

## 2019-06-20 NOTE — HPI
Patient is a 63 year old female with HTN, HLD, anxiety, fibromyalgia, history of thyroid cancer s/p complete thyroidectomy, and sjogren's who presents to hospital with complaints of slurred speech, lightheadedness, fatigue and recurrent falls. Patient reports that her problem started around noon today when a co-worker of hers felt that the patient had more slurred speech. As the day went on, the patient reports feeling more fatigued and feeling like she is likely to fall. At the time, co-workers noted her to be a bit pale and were having trouble finding a pulse. They called EMT's who suggested that she should come to hospital to be evaluated for this. In ED, she received IVF and .5 mg of atropine as she was found to be bradycardic in the 40's and have a blood pressure of 90/50; although it went as low as 70/42.     Patient has an extensive history of difficulty managing her HTN; she follows with cardiology at [facility]. One week ago she was started on carvedilol but only started taking it yesterday. She was only supposed to take a 12.5 dose but took the whole pill. She is on multiple blood pressure medications including triamterene-hydrochlorothiazide, spironolactone, losartan, amlodipine, and the newly added carvedilol. Patient reports her blood pressures to her cardiologist weekly which has several high readings however her  notes that her blood pressures are always elevated when she is in pain the mornings (170-180 systolic) and normalize throughout the day (130's systolic). In addition, patient's last fall happened in a similar fashion a few weeks ago in which she accidentally took double the dose of her medications. She and her  report that her pulse is usually in the 50's at home.      At time of exam, patient's blood pressure had increased to 130 systolic and her pulse was 48. She endorsed headache and fatigue. She denied chest pain, SOB, abdominal pain, n/v/d, fevers or chills. Per her ,  her speech had returned to normal and she was able to walk to the bathroom without difficulty, unassisted.

## 2019-06-20 NOTE — ED PROVIDER NOTES
"Encounter Date: 2019    SCRIBE #1 NOTE: I, Son Antoinette, am scribing for, and in the presence of,  Dr. Le . I have scribed the entire note.       History     Chief Complaint   Patient presents with    Hypotension     also bradycardic, while at work, co-workers noted change in speech, patient without neuro deficits at present      63 y.o. female with history of HTN, HLD  presenting via EMS with increased fatigue. Patient notes that her blood pressure has been elevated for the past few weeks. She recently seen her PCP 2 weeks ago for her hypertension which was where she had an echo and had increased the dosage of her losartan. Since then, she states that she has been more fatigue and describes it as being "tired." She denies any fever or chest pain. However, she states that she has "fallen" more often. Last episode was last night where she was in the kitchen. She denies loss of consciousness during these episodes and states that she "doesn't see it coming." She notes that she only sustained minor bruises due to the falls, but denies head trauma. Her  also notes that she has been "slurring in her speech."     The history is provided by the patient, medical records, the EMS personnel and the spouse.     Review of patient's allergies indicates:  No Known Allergies  Past Medical History:   Diagnosis Date    Anxiety     Cancer     thyroid    Chronic low back pain     Depression     Heart murmur     Hyperlipidemia     Hypertension     Menorrhagia     OA (osteoarthritis) of knee     Obesity     Personal history of colonic polyps      Past Surgical History:   Procedure Laterality Date    breast reduction       SECTION, LOW TRANSVERSE      HYSTERECTOMY      ROTATOR CUFF REPAIR      TOTAL KNEE ARTHROPLASTY      left     Family History   Problem Relation Age of Onset    Hypertension Mother     Colon cancer Father         colon    Alzheimer's disease Father      Social History "     Tobacco Use    Smoking status: Former Smoker     Types: Cigarettes     Last attempt to quit: 2012     Years since quittin.8    Smokeless tobacco: Never Used   Substance Use Topics    Alcohol use: No    Drug use: No     Review of Systems   Constitutional: Positive for fatigue. Negative for fever.   HENT: Negative for congestion.    Eyes: Negative for visual disturbance.   Respiratory: Negative for shortness of breath.    Cardiovascular: Negative for chest pain.   Gastrointestinal: Negative for abdominal pain.   Genitourinary: Negative for dysuria.   Musculoskeletal: Negative for back pain.   Skin:        + ecchymosis   Neurological: Positive for speech difficulty. Negative for syncope.       Physical Exam     Initial Vitals   BP Pulse Resp Temp SpO2   19 1325 19 1325 19 1325 19 1331 19 1325   (!) 90/50 (!) 40 16 97.8 °F (36.6 °C) 99 %      MAP       --                Physical Exam    Nursing note and vitals reviewed.  Constitutional: She appears well-developed and well-nourished. She is not diaphoretic. No distress.   HENT:   Head: Normocephalic and atraumatic.   Mouth/Throat: Oropharynx is clear and moist.   Eyes: Conjunctivae and EOM are normal. Pupils are equal, round, and reactive to light.   Neck: Normal range of motion. Neck supple. No JVD present.   Cardiovascular: Regular rhythm and normal heart sounds. Bradycardia present.    Pulmonary/Chest: Breath sounds normal. No respiratory distress. She has no wheezes. She has no rhonchi. She has no rales.   Abdominal: Soft. Bowel sounds are normal. She exhibits no distension and no mass. There is no tenderness. There is no rebound and no guarding.   Musculoskeletal: Normal range of motion. She exhibits no edema (no edema in extremities) or tenderness.   Neurological: She is alert and oriented to person, place, and time. She has normal strength and normal reflexes. No cranial nerve deficit or sensory deficit.   Clear  speech, normal strength and sensation, normal CN and mental status.    Skin: Skin is warm and dry. No rash noted.   Psychiatric: She has a normal mood and affect.         ED Course   Procedures  Labs Reviewed   CBC W/ AUTO DIFFERENTIAL - Abnormal; Notable for the following components:       Result Value    RBC 3.33 (*)     Hemoglobin 10.0 (*)     Hematocrit 30.5 (*)     Immature Granulocytes 0.6 (*)     Immature Grans (Abs) 0.05 (*)     All other components within normal limits   COMPREHENSIVE METABOLIC PANEL - Abnormal; Notable for the following components:    BUN, Bld 27 (*)     Calcium 8.5 (*)     Total Protein 5.7 (*)     Albumin 3.2 (*)     eGFR if  50.8 (*)     eGFR if non  44.1 (*)     All other components within normal limits   B-TYPE NATRIURETIC PEPTIDE - Abnormal; Notable for the following components:     (*)     All other components within normal limits   CULTURE, BLOOD   CULTURE, BLOOD   URINALYSIS, REFLEX TO URINE CULTURE    Narrative:     Preferred Collection Type->Urine, Clean Catch   LACTIC ACID, PLASMA   PROTIME-INR   APTT   TROPONIN I   LIPID PANEL   TROPONIN I   POCT GLUCOSE MONITORING CONTINUOUS     EKG Readings: (Independently Interpreted)   Sinus bradycardia, rate of 42 bpm, no ischemic changes       Imaging Results          CT Head Without Contrast (In process)                X-Ray Chest 1 View (Final result)  Result time 06/20/19 15:25:14    Final result by Michele Ramos MD (06/20/19 15:25:14)                 Impression:      No failure or pneumonia or nodule.      Electronically signed by: Michele Ramos  Date:    06/20/2019  Time:    15:25             Narrative:    EXAMINATION:  XR CHEST 1 VIEW    CLINICAL HISTORY:  Weakness    TECHNIQUE:  Single frontal view of the chest was performed.    COMPARISON:  08/23/2017    FINDINGS:  Single AP portable view at 15:18.    Modest inspiratory exam.  The heart and hilar shadows and trachea appear normal.  No  pneumothorax or pleural effusion or interstitial edema consolidation or nodule.  No abdominal free air.  There are overlying leads.                              X-Rays:   Independently Interpreted Readings:   Chest X-Ray: Normal heart, normal lungs.     Medical Decision Making:   History:   Old Medical Records: I decided to obtain old medical records.  Initial Assessment:   Patient with hypotension and bradycardia. Will start with IV fluids. Blood pressure has already come up from the 70s to the 90s. I suspect that that is where her falling and slurred speech are coming from. No signs of stroke at this point.  Independently Interpreted Test(s):   I have ordered and independently interpreted X-rays - see prior notes.  I have ordered and independently interpreted EKG Reading(s) - see prior notes  Clinical Tests:   Lab Tests: Ordered and Reviewed  Radiological Study: Ordered and Reviewed  Medical Tests: Ordered and Reviewed  ED Management:  2:25 PM  Her heart is in the low 40s. Blood pressure is 105 systolic. We are having technical difficulties obtaining a 12-lead    3:50 PM  Discussed with cardiology who recommended to hold on beta blockers. Discussed with medicine. They will observe.   Other:   I have discussed this case with another health care provider.       <> Summary of the Discussion: Cardiology, medicine             Scribe Attestation:   Scribe #1: I performed the above scribed service and the documentation accurately describes the services I performed. I attest to the accuracy of the note.    Attending Attestation:         Attending Critical Care:   Critical Care Times:   Direct Patient Care (initial evaluation, reassessments, and time considering the case)................................................................22 minutes.   Additional History from reviewing old medical records or taking additional history from the family, EMS, PCP, etc.......................3 minutes.   Ordering, Reviewing, and  Interpreting Diagnostic Studies...............................................................................................................3 minutes.   Documentation..................................................................................................................................................................................3 minutes.   Consultation with other Physicians. .................................................................................................................................................3 minutes.   ==============================================================  · Total Critical Care Time - exclusive of procedural time: 34 minutes.  ==============================================================                 Clinical Impression:       ICD-10-CM ICD-9-CM   1. Bradycardia R00.1 427.89   2. Weakness R53.1 780.79   3. Syncope R55 780.2         Disposition:   Disposition: Placed in Observation                        Piyush Le MD  06/20/19 4796       Piyush Le MD  06/20/19 8582

## 2019-06-20 NOTE — H&P
[facility] Hospital Medicine  History & Physical    Patient Name: Jorge Joshua  MRN: [id number]  Admission Date: 6/20/2019  Attending Physician: Wendy Carrington MD   Primary Care Provider: Fidelina Morales MD    [facility] Medicine Team: [facility]OS[facility] MED 1 Paulie Stevens MD     Patient information was obtained from patient, spouse/SO and ER records.     Subjective:     Principal Problem:Hypotension due to drugs    Chief Complaint:   Chief Complaint   Patient presents with    Hypotension     also bradycardic, while at work, co-workers noted change in speech, patient without neuro deficits at present         HPI: Patient is a 63 year old female with HTN, HLD, anxiety, fibromyalgia, history of thyroid cancer s/p complete thyroidectomy, and sjogren's who presents to hospital with complaints of slurred speech, lightheadedness, fatigue and recurrent falls. Patient reports that her problem started around noon today when a co-worker of hers felt that the patient had more slurred speech. As the day went on, the patient reports feeling more fatigued and feeling like she is likely to fall. At the time, co-workers noted her to be a bit pale and were having trouble finding a pulse. They called EMT's who suggested that she should come to hospital to be evaluated for this. In ED, she received IVF and .5 mg of atropine as she was found to be bradycardic in the 40's and have a blood pressure of 90/50; although it went as low as 70/42.     Patient has an extensive history of difficulty managing her HTN; she follows with cardiology at [facility]. One week ago she was started on carvedilol but only started taking it yesterday. She was only supposed to take a 12.5 dose but took the whole pill. She is on multiple blood pressure medications including triamterene-hydrochlorothiazide, spironolactone, losartan, amlodipine, and the newly added carvedilol. Patient reports her blood pressures to her cardiologist weekly which has  several high readings however her  notes that her blood pressures are always elevated when she is in pain the mornings (170-180 systolic) and normalize throughout the day (130's systolic). In addition, patient's last fall happened in a similar fashion a few weeks ago in which she accidentally took double the dose of her medications. She and her  report that her pulse is usually in the 50's at home.      At time of exam, patient's blood pressure had increased to 130 systolic and her pulse was 48. She endorsed headache and fatigue. She denied chest pain, SOB, abdominal pain, n/v/d, fevers or chills. Per her , her speech had returned to normal and she was able to walk to the bathroom without difficulty, unassisted.      Past Medical History:   Diagnosis Date    Anxiety     Cancer     thyroid    Chronic low back pain     Depression     Heart murmur     Hyperlipidemia     Hypertension     Menorrhagia     OA (osteoarthritis) of knee     Obesity     Personal history of colonic polyps        Past Surgical History:   Procedure Laterality Date    breast reduction       SECTION, LOW TRANSVERSE      HYSTERECTOMY      ROTATOR CUFF REPAIR      TOTAL KNEE ARTHROPLASTY      left       Review of patient's allergies indicates:  No Known Allergies    No current facility-administered medications on file prior to encounter.      Current Outpatient Medications on File Prior to Encounter   Medication Sig    acetaminophen-codeine 300-30mg (TYLENOL #3) 300-30 mg Tab TK 1 T PO  TID    amLODIPine (NORVASC) 10 MG tablet Take 10 mg by mouth every evening.    atorvastatin (LIPITOR) 40 MG tablet Take 40 mg by mouth once daily. One a day or as directed    busPIRone (BUSPAR) 15 MG tablet     carvedilol (COREG) 12.5 MG tablet Take 1 tablet (12.5 mg total) by mouth 2 (two) times daily. One tablet twice a day or as directed    clonazePAM (KLONOPIN) 2 MG Tab 2 mg 2 (two) times daily as needed.      dextroamphetamine-amphetamine 30 mg Tab once daily.     ergocalciferol (ERGOCALCIFEROL) 50,000 unit Cap     estradiol 0.05 mg/24 hr td ptwk 0.05 mg/24 hr PTWK Place 1 patch onto the skin every 7 days.    finasteride (PROSCAR) 5 mg tablet     levothyroxine (SYNTHROID) 125 MCG tablet Take 1 tablet (125 mcg total) by mouth before breakfast.    losartan (COZAAR) 100 MG tablet Take 1 tablet (100 mg total) by mouth once daily.    methotrexate 2.5 MG Tab every 7 days.     paroxetine (PAXIL) 20 MG tablet 20 mg once daily.     potassium chloride (K-TAB) 20 mEq Take 1 tablet (20 mEq total) by mouth once daily.    propranolol (INDERAL) 20 MG tablet     RHEUMATE 1-1-500 mg Cap Take 1 capsule by mouth once daily.    spironolactone (ALDACTONE) 25 MG tablet Take 25 mg by mouth once daily.    triamterene-hydrochlorothiazide 37.5-25 mg (MAXZIDE-25) 37.5-25 mg per tablet Take 1 tablet by mouth once daily. Use as directed 0.5-1 daily or every other day (Patient taking differently: Take 1 tablet by mouth once daily. )    venlafaxine (EFFEXOR-XR) 150 MG Cp24      Family History     Problem Relation (Age of Onset)    Alzheimer's disease Father    Colon cancer Father    Hypertension Mother        Tobacco Use    Smoking status: Former Smoker     Types: Cigarettes     Last attempt to quit: 2012     Years since quittin.8    Smokeless tobacco: Never Used   Substance and Sexual Activity    Alcohol use: No    Drug use: No    Sexual activity: Yes     Partners: Male     Review of Systems   Constitutional: Positive for activity change and fatigue. Negative for chills and fever.   HENT: Negative for congestion and sore throat.    Eyes: Positive for visual disturbance (patient has chronic retinopathy, complication of thyroid cancer).   Respiratory: Negative for cough, chest tightness and shortness of breath.    Cardiovascular: Negative for chest pain, palpitations and leg swelling.   Gastrointestinal: Negative for  abdominal pain, constipation, diarrhea and nausea.   Endocrine: Negative for cold intolerance and heat intolerance.   Genitourinary: Negative for dysuria, flank pain and frequency (increased due to IVF administration).   Musculoskeletal: Positive for myalgias (diffuse). Negative for arthralgias and back pain.   Skin: Negative for color change, pallor and rash.   Neurological: Positive for headaches. Negative for dizziness, speech difficulty and weakness.   Psychiatric/Behavioral: Negative for confusion and decreased concentration.     Objective:     Vital Signs (Most Recent):  Temp: 97.8 °F (36.6 °C) (06/20/19 1331)  Pulse: (!) 46 (06/20/19 1630)  Resp: 17 (06/20/19 1650)  BP: 128/68 (06/20/19 1630)  SpO2: 95 % (06/20/19 1630) Vital Signs (24h Range):  Temp:  [97.8 °F (36.6 °C)] 97.8 °F (36.6 °C)  Pulse:  [40-48] 46  Resp:  [16-20] 17  SpO2:  [94 %-99 %] 95 %  BP: ()/(42-68) 128/68        There is no height or weight on file to calculate BMI.    Physical Exam   Constitutional: She is oriented to person, place, and time. She appears well-developed and well-nourished. No distress.   HENT:   Head: Normocephalic and atraumatic.   Eyes: Pupils are equal, round, and reactive to light. Conjunctivae and EOM are normal.   Neck: Normal range of motion. Neck supple. No JVD present.   Cardiovascular: Regular rhythm, normal heart sounds and intact distal pulses.   Bradycardic, no carotid bruit heard   Pulmonary/Chest: Effort normal and breath sounds normal. She has no wheezes. She has no rales.   Abdominal: Soft. Bowel sounds are normal. There is no tenderness.   Musculoskeletal: Normal range of motion. She exhibits no edema or deformity.   Neurological: She is alert and oriented to person, place, and time. No cranial nerve deficit or sensory deficit.   Skin: Skin is warm and dry.   Psychiatric: She has a normal mood and affect. Her behavior is normal.   Vitals reviewed.        CRANIAL NERVES     CN III, IV, VI   Pupils  are equal, round, and reactive to light.  Extraocular motions are normal.        Significant Labs:   CBC:   Recent Labs   Lab 06/20/19  1411   WBC 8.18   HGB 10.0*   HCT 30.5*        CMP:   Recent Labs   Lab 06/20/19  1411      K 3.9      CO2 24   GLU 84   BUN 27*   CREATININE 1.3   CALCIUM 8.5*   PROT 5.7*   ALBUMIN 3.2*   BILITOT 0.6   ALKPHOS 81   AST 16   ALT 13   ANIONGAP 10   EGFRNONAA 44.1*     Coagulation:   Recent Labs   Lab 06/20/19  1411   INR 0.9   APTT 22.4     Lactic Acid:   Recent Labs   Lab 06/20/19  1416   LACTATE 1.2     Troponin:   Recent Labs   Lab 06/20/19  1411   TROPONINI <0.006       Significant Imaging:     X-ray chest: No failure or pneumonia or nodule.    Assessment/Plan:     * Hypotension due to drugs  - Patient is on multiple blood pressure medications at home due to refractory HTN  - Symptoms started after starting carvedilol and previous episodes of hypotensive symptoms appear to have started after accidentally taking double her usual medication  - At home, on triamterene-hydrochlorothiazide, spironolactone, losartan, amlodipine, and carvedilol: holding all HTN medications here  - Treated with 1 L saline bolus, will give additional fluid as needed  - See Syncope       Syncope  - Patient has had multiple syncopal episodes in the past, several of which have resulted in falls  - Although these appear to be correlated to her HTN medications, differential diagnosis includes carotid artery stenosis, heart failure, CAD, hypothyroidism, hypoglycemia.  - Will get carotid ultrasound, TSH, lipid panel, HA1c, echocardiogram, and check glucose q2 for now  - Also want to rule out possibility of TIA or ischemic stroke either now or in the past. Will get CT head without contrast, and MRI brain tomorrow      Bradycardia  - Patient's heart rate is normally in the 50's. She had heart rates as low as 40  - Received .5 mg of atropine in ED, ordered additional dose of atropine   - On  telemetry, ordered atropine prn to be used in case of symptomatic bradycardia  - Nursing communication to keep pacer pads on the patient      S/P thyroidectomy  - continue home synthroid       Hyperlipidemia  - Continue home statin       Major depression  - Continue home paroxetine      Hypertension  - See Drug induced hypotension  - Holding all HTN meds inpatient  - Will require follow up with cardiology/pcp to come up with new HTN regimen       Anxiety  - Patient takes clonazepam 2 mg BID  - Will continue here, however will leave as prn       VTE Risk Mitigation (From admission, onward)        Ordered     enoxaparin injection 40 mg  Daily      06/20/19 1738     IP VTE HIGH RISK PATIENT  Once      06/20/19 1738             Pauile Stevens MD  Department of Hospital Medicine   [facility]

## 2019-06-20 NOTE — SUBJECTIVE & OBJECTIVE
Past Medical History:   Diagnosis Date    Anxiety     Cancer     thyroid    Chronic low back pain     Depression     Heart murmur     Hyperlipidemia     Hypertension     Menorrhagia     OA (osteoarthritis) of knee     Obesity     Personal history of colonic polyps        Past Surgical History:   Procedure Laterality Date    breast reduction       SECTION, LOW TRANSVERSE      HYSTERECTOMY      ROTATOR CUFF REPAIR      TOTAL KNEE ARTHROPLASTY      left       Review of patient's allergies indicates:  No Known Allergies    No current facility-administered medications on file prior to encounter.      Current Outpatient Medications on File Prior to Encounter   Medication Sig    acetaminophen-codeine 300-30mg (TYLENOL #3) 300-30 mg Tab TK 1 T PO  TID    amLODIPine (NORVASC) 10 MG tablet Take 10 mg by mouth every evening.    atorvastatin (LIPITOR) 40 MG tablet Take 40 mg by mouth once daily. One a day or as directed    busPIRone (BUSPAR) 15 MG tablet     carvedilol (COREG) 12.5 MG tablet Take 1 tablet (12.5 mg total) by mouth 2 (two) times daily. One tablet twice a day or as directed    clonazePAM (KLONOPIN) 2 MG Tab 2 mg 2 (two) times daily as needed.     dextroamphetamine-amphetamine 30 mg Tab once daily.     ergocalciferol (ERGOCALCIFEROL) 50,000 unit Cap     estradiol 0.05 mg/24 hr td ptwk 0.05 mg/24 hr PTWK Place 1 patch onto the skin every 7 days.    finasteride (PROSCAR) 5 mg tablet     levothyroxine (SYNTHROID) 125 MCG tablet Take 1 tablet (125 mcg total) by mouth before breakfast.    losartan (COZAAR) 100 MG tablet Take 1 tablet (100 mg total) by mouth once daily.    methotrexate 2.5 MG Tab every 7 days.     paroxetine (PAXIL) 20 MG tablet 20 mg once daily.     potassium chloride (K-TAB) 20 mEq Take 1 tablet (20 mEq total) by mouth once daily.    propranolol (INDERAL) 20 MG tablet     RHEUMATE 1-1-500 mg Cap Take 1 capsule by mouth once daily.    spironolactone  (ALDACTONE) 25 MG tablet Take 25 mg by mouth once daily.    triamterene-hydrochlorothiazide 37.5-25 mg (MAXZIDE-25) 37.5-25 mg per tablet Take 1 tablet by mouth once daily. Use as directed 0.5-1 daily or every other day (Patient taking differently: Take 1 tablet by mouth once daily. )    venlafaxine (EFFEXOR-XR) 150 MG Cp24      Family History     Problem Relation (Age of Onset)    Alzheimer's disease Father    Colon cancer Father    Hypertension Mother        Tobacco Use    Smoking status: Former Smoker     Types: Cigarettes     Last attempt to quit: 2012     Years since quittin.8    Smokeless tobacco: Never Used   Substance and Sexual Activity    Alcohol use: No    Drug use: No    Sexual activity: Yes     Partners: Male     Review of Systems   Constitutional: Positive for activity change and fatigue. Negative for chills and fever.   HENT: Negative for congestion and sore throat.    Eyes: Positive for visual disturbance (patient has chronic retinopathy, complication of thyroid cancer).   Respiratory: Negative for cough, chest tightness and shortness of breath.    Cardiovascular: Negative for chest pain, palpitations and leg swelling.   Gastrointestinal: Negative for abdominal pain, constipation, diarrhea and nausea.   Endocrine: Negative for cold intolerance and heat intolerance.   Genitourinary: Negative for dysuria, flank pain and frequency (increased due to IVF administration).   Musculoskeletal: Positive for myalgias (diffuse). Negative for arthralgias and back pain.   Skin: Negative for color change, pallor and rash.   Neurological: Positive for headaches. Negative for dizziness, speech difficulty and weakness.   Psychiatric/Behavioral: Negative for confusion and decreased concentration.     Objective:     Vital Signs (Most Recent):  Temp: 97.8 °F (36.6 °C) (19 1331)  Pulse: (!) 46 (19 1630)  Resp: 17 (19 1650)  BP: 128/68 (19 1630)  SpO2: 95 % (19 1630) Vital  Signs (24h Range):  Temp:  [97.8 °F (36.6 °C)] 97.8 °F (36.6 °C)  Pulse:  [40-48] 46  Resp:  [16-20] 17  SpO2:  [94 %-99 %] 95 %  BP: ()/(42-68) 128/68        There is no height or weight on file to calculate BMI.    Physical Exam   Constitutional: She is oriented to person, place, and time. She appears well-developed and well-nourished. No distress.   HENT:   Head: Normocephalic and atraumatic.   Eyes: Pupils are equal, round, and reactive to light. Conjunctivae and EOM are normal.   Neck: Normal range of motion. Neck supple. No JVD present.   Cardiovascular: Regular rhythm, normal heart sounds and intact distal pulses.   Bradycardic, no carotid bruit heard   Pulmonary/Chest: Effort normal and breath sounds normal. She has no wheezes. She has no rales.   Abdominal: Soft. Bowel sounds are normal. There is no tenderness.   Musculoskeletal: Normal range of motion. She exhibits no edema or deformity.   Neurological: She is alert and oriented to person, place, and time. No cranial nerve deficit or sensory deficit.   Skin: Skin is warm and dry.   Psychiatric: She has a normal mood and affect. Her behavior is normal.   Vitals reviewed.        CRANIAL NERVES     CN III, IV, VI   Pupils are equal, round, and reactive to light.  Extraocular motions are normal.        Significant Labs:   CBC:   Recent Labs   Lab 06/20/19  1411   WBC 8.18   HGB 10.0*   HCT 30.5*        CMP:   Recent Labs   Lab 06/20/19  1411      K 3.9      CO2 24   GLU 84   BUN 27*   CREATININE 1.3   CALCIUM 8.5*   PROT 5.7*   ALBUMIN 3.2*   BILITOT 0.6   ALKPHOS 81   AST 16   ALT 13   ANIONGAP 10   EGFRNONAA 44.1*     Coagulation:   Recent Labs   Lab 06/20/19  1411   INR 0.9   APTT 22.4     Lactic Acid:   Recent Labs   Lab 06/20/19  1416   LACTATE 1.2     Troponin:   Recent Labs   Lab 06/20/19  1411   TROPONINI <0.006       Significant Imaging:     X-ray chest: No failure or pneumonia or nodule.

## 2019-06-21 ENCOUNTER — CLINICAL SUPPORT (OUTPATIENT)
Dept: CARDIOLOGY | Facility: CLINIC | Age: 64
DRG: 918 | End: 2019-06-21
Attending: EMERGENCY MEDICINE
Payer: COMMERCIAL

## 2019-06-21 VITALS
OXYGEN SATURATION: 95 % | HEART RATE: 56 BPM | HEIGHT: 60 IN | SYSTOLIC BLOOD PRESSURE: 123 MMHG | BODY MASS INDEX: 39.27 KG/M2 | TEMPERATURE: 98 F | WEIGHT: 200 LBS | RESPIRATION RATE: 18 BRPM | DIASTOLIC BLOOD PRESSURE: 61 MMHG

## 2019-06-21 LAB
ALBUMIN SERPL BCP-MCNC: 3.3 G/DL (ref 3.5–5.2)
ANION GAP SERPL CALC-SCNC: 11 MMOL/L (ref 8–16)
ASCENDING AORTA: 2.94 CM
AV INDEX (PROSTH): 0.66
AV MEAN GRADIENT: 7.22 MMHG
AV PEAK GRADIENT: 14.29 MMHG
AV VALVE AREA: 2.09 CM2
AV VELOCITY RATIO: 0.63
BASOPHILS # BLD AUTO: 0.05 K/UL (ref 0–0.2)
BASOPHILS NFR BLD: 0.6 % (ref 0–1.9)
BSA FOR ECHO PROCEDURE: 1.96 M2
BUN SERPL-MCNC: 22 MG/DL (ref 8–23)
CALCIUM SERPL-MCNC: 9 MG/DL (ref 8.7–10.5)
CHLORIDE SERPL-SCNC: 108 MMOL/L (ref 95–110)
CO2 SERPL-SCNC: 25 MMOL/L (ref 23–29)
CREAT SERPL-MCNC: 0.9 MG/DL (ref 0.5–1.4)
CV ECHO LV RWT: 0.45 CM
DIFFERENTIAL METHOD: ABNORMAL
DOP CALC AO PEAK VEL: 1.89 M/S
DOP CALC AO VTI: 45.35 CM
DOP CALC LVOT AREA: 3.14 CM2
DOP CALC LVOT DIAMETER: 2 CM
DOP CALC LVOT PEAK VEL: 1.18 M/S
DOP CALC LVOT STROKE VOLUME: 94.67 CM3
DOP CALCLVOT PEAK VEL VTI: 30.15 CM
E WAVE DECELERATION TIME: 317.8 MSEC
E/A RATIO: 1.23
E/E' RATIO: 9.83
ECHO LV POSTERIOR WALL: 0.99 CM (ref 0.6–1.1)
EOSINOPHIL # BLD AUTO: 0.2 K/UL (ref 0–0.5)
EOSINOPHIL NFR BLD: 2.2 % (ref 0–8)
ERYTHROCYTE [DISTWIDTH] IN BLOOD BY AUTOMATED COUNT: 13.8 % (ref 11.5–14.5)
EST. GFR  (AFRICAN AMERICAN): >60 ML/MIN/1.73 M^2
EST. GFR  (NON AFRICAN AMERICAN): >60 ML/MIN/1.73 M^2
ESTIMATED AVG GLUCOSE: 117 MG/DL (ref 68–131)
FRACTIONAL SHORTENING: 24 % (ref 28–44)
GLUCOSE SERPL-MCNC: 134 MG/DL (ref 70–110)
HBA1C MFR BLD HPLC: 5.7 % (ref 4–5.6)
HCT VFR BLD AUTO: 33.7 % (ref 37–48.5)
HGB BLD-MCNC: 10.7 G/DL (ref 12–16)
IMM GRANULOCYTES # BLD AUTO: 0.03 K/UL (ref 0–0.04)
IMM GRANULOCYTES NFR BLD AUTO: 0.4 % (ref 0–0.5)
INTERVENTRICULAR SEPTUM: 0.8 CM (ref 0.6–1.1)
LA MAJOR: 5.05 CM
LA MINOR: 5.02 CM
LA WIDTH: 3.8 CM
LEFT ARM DIASTOLIC BLOOD PRESSURE: 73 MMHG
LEFT ARM SYSTOLIC BLOOD PRESSURE: 160 MMHG
LEFT ATRIUM SIZE: 3.4 CM
LEFT ATRIUM VOLUME INDEX: 29.6 ML/M2
LEFT ATRIUM VOLUME: 55.29 CM3
LEFT CBA DIAS: 23 CM/S
LEFT CBA SYS: 69 CM/S
LEFT CCA DIST DIAS: 25 CM/S
LEFT CCA DIST SYS: 75 CM/S
LEFT CCA MID DIAS: 24 CM/S
LEFT CCA MID SYS: 81 CM/S
LEFT CCA PROX DIAS: 18 CM/S
LEFT CCA PROX SYS: 93 CM/S
LEFT ECA DIAS: 12 CM/S
LEFT ECA SYS: 69 CM/S
LEFT ICA DIST DIAS: 41 CM/S
LEFT ICA DIST SYS: 112 CM/S
LEFT ICA MID DIAS: 18 CM/S
LEFT ICA MID SYS: 94 CM/S
LEFT ICA PROX DIAS: 28 CM/S
LEFT ICA PROX SYS: 75 CM/S
LEFT INTERNAL DIMENSION IN SYSTOLE: 3.35 CM (ref 2.1–4)
LEFT VENTRICLE DIASTOLIC VOLUME INDEX: 47.09 ML/M2
LEFT VENTRICLE DIASTOLIC VOLUME: 87.89 ML
LEFT VENTRICLE MASS INDEX: 68.1 G/M2
LEFT VENTRICLE SYSTOLIC VOLUME INDEX: 24.4 ML/M2
LEFT VENTRICLE SYSTOLIC VOLUME: 45.61 ML
LEFT VENTRICULAR INTERNAL DIMENSION IN DIASTOLE: 4.4 CM (ref 3.5–6)
LEFT VENTRICULAR MASS: 127.06 G
LEFT VERTEBRAL DIAS: 27 CM/S
LEFT VERTEBRAL SYS: 85 CM/S
LV LATERAL E/E' RATIO: 8.07
LV SEPTAL E/E' RATIO: 12.56
LYMPHOCYTES # BLD AUTO: 2.5 K/UL (ref 1–4.8)
LYMPHOCYTES NFR BLD: 31.2 % (ref 18–48)
MAGNESIUM SERPL-MCNC: 1.7 MG/DL (ref 1.6–2.6)
MCH RBC QN AUTO: 29.6 PG (ref 27–31)
MCHC RBC AUTO-ENTMCNC: 31.8 G/DL (ref 32–36)
MCV RBC AUTO: 93 FL (ref 82–98)
MONOCYTES # BLD AUTO: 0.7 K/UL (ref 0.3–1)
MONOCYTES NFR BLD: 8.7 % (ref 4–15)
MV PEAK A VEL: 0.92 M/S
MV PEAK E VEL: 1.13 M/S
NEUTROPHILS # BLD AUTO: 4.5 K/UL (ref 1.8–7.7)
NEUTROPHILS NFR BLD: 56.9 % (ref 38–73)
NRBC BLD-RTO: 0 /100 WBC
OHS CV CAROTID RIGHT ICA EDV HIGHEST: 43
OHS CV CAROTID ULTRASOUND LEFT ICA/CCA RATIO: 1.2
OHS CV CAROTID ULTRASOUND RIGHT ICA/CCA RATIO: 0.98
OHS CV PV CAROTID LEFT HIGHEST CCA: 93
OHS CV PV CAROTID LEFT HIGHEST ICA: 112
OHS CV PV CAROTID RIGHT HIGHEST CCA: 134
OHS CV PV CAROTID RIGHT HIGHEST ICA: 131
OHS CV US CAROTID LEFT HIGHEST EDV: 41
PHOSPHATE SERPL-MCNC: 3.3 MG/DL (ref 2.7–4.5)
PISA TR MAX VEL: 2.76 M/S
PLATELET # BLD AUTO: 289 K/UL (ref 150–350)
PMV BLD AUTO: 9.8 FL (ref 9.2–12.9)
POCT GLUCOSE: 57 MG/DL (ref 70–110)
POTASSIUM SERPL-SCNC: 3.5 MMOL/L (ref 3.5–5.1)
RA MAJOR: 4.93 CM
RA PRESSURE: 3 MMHG
RA WIDTH: 3.14 CM
RBC # BLD AUTO: 3.61 M/UL (ref 4–5.4)
RIGHT ARM DIASTOLIC BLOOD PRESSURE: 73 MMHG
RIGHT ARM SYSTOLIC BLOOD PRESSURE: 160 MMHG
RIGHT CBA DIAS: 21 CM/S
RIGHT CBA SYS: 83 CM/S
RIGHT CCA DIST DIAS: 32 CM/S
RIGHT CCA DIST SYS: 134 CM/S
RIGHT CCA MID DIAS: 21 CM/S
RIGHT CCA MID SYS: 114 CM/S
RIGHT CCA PROX DIAS: 16 CM/S
RIGHT CCA PROX SYS: 78 CM/S
RIGHT ECA DIAS: 15 CM/S
RIGHT ECA SYS: 93 CM/S
RIGHT ICA DIST DIAS: 18 CM/S
RIGHT ICA DIST SYS: 95 CM/S
RIGHT ICA MID DIAS: 43 CM/S
RIGHT ICA MID SYS: 131 CM/S
RIGHT ICA PROX DIAS: 27 CM/S
RIGHT ICA PROX SYS: 87 CM/S
RIGHT VENTRICULAR END-DIASTOLIC DIMENSION: 3.45 CM
RIGHT VERTEBRAL DIAS: 26 CM/S
RIGHT VERTEBRAL SYS: 74 CM/S
SINUS: 2.69 CM
SODIUM SERPL-SCNC: 144 MMOL/L (ref 136–145)
STJ: 2.8 CM
T4 FREE SERPL-MCNC: 0.89 NG/DL (ref 0.71–1.51)
TDI LATERAL: 0.14
TDI SEPTAL: 0.09
TDI: 0.12
TR MAX PG: 30.47 MMHG
TRICUSPID ANNULAR PLANE SYSTOLIC EXCURSION: 2.62 CM
TSH SERPL DL<=0.005 MIU/L-ACNC: 14.95 UIU/ML (ref 0.4–4)
TV REST PULMONARY ARTERY PRESSURE: 33 MMHG
WBC # BLD AUTO: 7.89 K/UL (ref 3.9–12.7)

## 2019-06-21 PROCEDURE — 84439 ASSAY OF FREE THYROXINE: CPT

## 2019-06-21 PROCEDURE — 83735 ASSAY OF MAGNESIUM: CPT

## 2019-06-21 PROCEDURE — 83036 HEMOGLOBIN GLYCOSYLATED A1C: CPT

## 2019-06-21 PROCEDURE — 99239 PR HOSPITAL DISCHARGE DAY,>30 MIN: ICD-10-PCS | Mod: ,,, | Performed by: HOSPITALIST

## 2019-06-21 PROCEDURE — 63600175 PHARM REV CODE 636 W HCPCS: Performed by: STUDENT IN AN ORGANIZED HEALTH CARE EDUCATION/TRAINING PROGRAM

## 2019-06-21 PROCEDURE — 25000003 PHARM REV CODE 250

## 2019-06-21 PROCEDURE — 93880 EXTRACRANIAL BILAT STUDY: CPT | Mod: 26,,, | Performed by: INTERNAL MEDICINE

## 2019-06-21 PROCEDURE — 93880 EXTRACRANIAL BILAT STUDY: CPT | Mod: 50

## 2019-06-21 PROCEDURE — 85025 COMPLETE CBC W/AUTO DIFF WBC: CPT

## 2019-06-21 PROCEDURE — 99222 1ST HOSP IP/OBS MODERATE 55: CPT | Mod: ,,, | Performed by: INTERNAL MEDICINE

## 2019-06-21 PROCEDURE — 25000003 PHARM REV CODE 250: Performed by: STUDENT IN AN ORGANIZED HEALTH CARE EDUCATION/TRAINING PROGRAM

## 2019-06-21 PROCEDURE — 99222 PR INITIAL HOSPITAL CARE,LEVL II: ICD-10-PCS | Mod: ,,, | Performed by: INTERNAL MEDICINE

## 2019-06-21 PROCEDURE — 99239 HOSP IP/OBS DSCHRG MGMT >30: CPT | Mod: ,,, | Performed by: HOSPITALIST

## 2019-06-21 PROCEDURE — 84443 ASSAY THYROID STIM HORMONE: CPT

## 2019-06-21 PROCEDURE — 93880 CV US DOPPLER CAROTID (CUPID ONLY): ICD-10-PCS | Mod: 26,,, | Performed by: INTERNAL MEDICINE

## 2019-06-21 PROCEDURE — 80069 RENAL FUNCTION PANEL: CPT

## 2019-06-21 RX ORDER — LOSARTAN POTASSIUM 100 MG/1
100 TABLET ORAL DAILY
Qty: 90 TABLET | Refills: 3
Start: 2019-06-21 | End: 2020-02-29 | Stop reason: SDUPTHER

## 2019-06-21 RX ORDER — LEVOTHYROXINE SODIUM 50 UG/1
100 TABLET ORAL
Status: DISCONTINUED | OUTPATIENT
Start: 2019-06-22 | End: 2019-06-21 | Stop reason: HOSPADM

## 2019-06-21 RX ORDER — TRIAMTERENE/HYDROCHLOROTHIAZID 37.5-25 MG
1 TABLET ORAL DAILY
Start: 2019-06-21 | End: 2019-09-17 | Stop reason: ALTCHOICE

## 2019-06-21 RX ORDER — PAROXETINE 10 MG/1
20 TABLET, FILM COATED ORAL 2 TIMES DAILY
Status: DISCONTINUED | OUTPATIENT
Start: 2019-06-21 | End: 2019-06-21 | Stop reason: HOSPADM

## 2019-06-21 RX ORDER — LOSARTAN POTASSIUM 50 MG/1
100 TABLET ORAL DAILY
Status: DISCONTINUED | OUTPATIENT
Start: 2019-06-21 | End: 2019-06-21 | Stop reason: HOSPADM

## 2019-06-21 RX ORDER — AZATHIOPRINE 50 MG/1
50 TABLET ORAL DAILY
Status: DISCONTINUED | OUTPATIENT
Start: 2019-06-21 | End: 2019-06-21 | Stop reason: HOSPADM

## 2019-06-21 RX ORDER — OXYCODONE HYDROCHLORIDE 5 MG/1
5 TABLET ORAL ONCE
Status: COMPLETED | OUTPATIENT
Start: 2019-06-21 | End: 2019-06-21

## 2019-06-21 RX ORDER — LOSARTAN POTASSIUM 100 MG/1
100 TABLET ORAL DAILY
Qty: 90 TABLET | Refills: 3 | Status: CANCELLED
Start: 2019-06-21

## 2019-06-21 RX ADMIN — CLONAZEPAM 2 MG: 0.5 TABLET ORAL at 11:06

## 2019-06-21 RX ADMIN — PAROXETINE 20 MG: 10 TABLET, FILM COATED ORAL at 09:06

## 2019-06-21 RX ADMIN — GABAPENTIN 300 MG: 300 CAPSULE ORAL at 03:06

## 2019-06-21 RX ADMIN — AZATHIOPRINE 50 MG: 50 TABLET ORAL at 09:06

## 2019-06-21 RX ADMIN — LEVOTHYROXINE SODIUM 125 MCG: 75 TABLET ORAL at 05:06

## 2019-06-21 RX ADMIN — LOSARTAN POTASSIUM 100 MG: 50 TABLET, FILM COATED ORAL at 11:06

## 2019-06-21 RX ADMIN — ACETAMINOPHEN 650 MG: 325 TABLET ORAL at 09:06

## 2019-06-21 RX ADMIN — OXYCODONE HYDROCHLORIDE 5 MG: 5 TABLET ORAL at 01:06

## 2019-06-21 RX ADMIN — ATORVASTATIN CALCIUM 40 MG: 10 TABLET, FILM COATED ORAL at 09:06

## 2019-06-21 RX ADMIN — GABAPENTIN 300 MG: 300 CAPSULE ORAL at 09:06

## 2019-06-21 NOTE — ASSESSMENT & PLAN NOTE
- Patient has had multiple syncopal episodes in the past, several of which have resulted in falls  - Although these appear to be correlated to her HTN medications, differential diagnosis includes carotid artery stenosis, heart failure, CAD, hypothyroidism, hypoglycemia.  - Will get carotid ultrasound (pending), TSH (highly elevated), lipid panel (normal, on statin, 10 year risk of 3%), HA1c (normal), echocardiogram (pending)  - CT and MRI only show microvascular changes, no large areas of infarct

## 2019-06-21 NOTE — ED NOTES
poct glucose 57 mg/dl . After eating dinner (cardiac diet). Hasn't eaten all day today except her dinner meal

## 2019-06-21 NOTE — HOSPITAL COURSE
Patient admitted to hospital medicine for symptomatic bradycardia and hypotension likely caused by excessive HTN medication. She received atropine and IVF in ED which brought her pulse closer to 50's (her baseline) and blood pressure into normotensive range. She received one more dose of atropine by hospital medicine prior to the night shift. No acute events overnight. CT and MRI showed no concerning changes. Her lipid panel, HA1c were both normal however her TSH was highly elevated. She has a history of thyroid cancer and complete thyroidectomy so endocrine was consulted and asked to leave their recommendations. Patient also underwent Echo and carotid ultrasound. Will discharge after endocrine sees the patient.

## 2019-06-21 NOTE — HPI
A 64 yo woman with diagnosis of HTN, HLD, anxiety, fibromyalgia, sjogren's, history of thyroid cancer (follicular variant of PTC) s/p complete thyroidectomy in 2017, has been admitted to hospital medicine for possible TIA and bradycardia. Her TFTs showed normal FT4 with slightly elevated TSH. Endocrinology was consulted for further evaluation of her thyroid function.     Pt follows up with Dr. Rose for hypothyroidism. She was recently (about 3-4 weeks ago) switched to an oral preparation and reports having issues with absorption in the past. She is currently on liquid preparation of thyroid hormone 100 mcg, qAM. Reports taking it every morning on an empty stomach.

## 2019-06-21 NOTE — CONSULTS
[facility] [address]  Endocrinology  Consult Note    Consult Requested by: Wendy Carrington MD   Reason for admit: Hypotension due to drugs    HISTORY OF PRESENT ILLNESS:  A 64 yo woman with diagnosis of HTN, HLD, anxiety, fibromyalgia, sjogren's, history of thyroid cancer (follicular variant of PTC) s/p complete thyroidectomy in 2017, has been admitted to hospital medicine for possible TIA and bradycardia. Her TFTs showed normal FT4 with slightly elevated TSH. Endocrinology was consulted for further evaluation of her thyroid function.     Pt follows up with Dr. Rose for hypothyroidism. She was recently (about 3-4 weeks ago) switched to an oral preparation and reports having issues with absorption in the past. She is currently on liquid preparation of thyroid hormone 100 mcg, qAM. Reports taking it every morning on an empty stomach.     Medications and/or Treatments Impacting Glycemic Control:  Immunotherapy:  Immunosuppressants         Stop Route Frequency     azaTHIOprine tablet 50 mg      -- Oral Daily        Steroids:   Hormones (From admission, onward)    None        Pressors:    Autonomic Drugs (From admission, onward)    None            (Not in a hospital admission)    Current Facility-Administered Medications   Medication Dose Route Frequency Provider Last Rate Last Dose    acetaminophen tablet 650 mg  650 mg Oral Q4H PRN Paulie Stevens MD   650 mg at 06/21/19 0929    atorvastatin tablet 40 mg  40 mg Oral Daily Paulie Stevens MD   40 mg at 06/21/19 0913    atropine injection 0.5 mg  0.5 mg Intravenous Q5 Min PRN Paulie Stevens MD        azaTHIOprine tablet 50 mg  50 mg Oral Daily Paulie Stevens MD   50 mg at 06/21/19 0913    clonazePAM tablet 2 mg  2 mg Oral BID PRN Paulie Stevens MD   2 mg at 06/21/19 1139    dextrose 10% (D10W) Bolus  12.5 g Intravenous PRN Paulie Stevens MD        dextrose 10% (D10W) Bolus  25 g Intravenous PRN Paulie Stevens MD        enoxaparin  injection 40 mg  40 mg Subcutaneous Daily Paulie Stevens MD   40 mg at 06/20/19 1801    gabapentin capsule 300 mg  300 mg Oral TID Paulie Stevens MD   300 mg at 06/21/19 1547    glucagon (human recombinant) injection 1 mg  1 mg Intramuscular PRN Paulie Stevens MD        glucose chewable tablet 16 g  16 g Oral PRN Paulie Stevens MD        glucose chewable tablet 24 g  24 g Oral PRN Paulie Stevens MD        [START ON 6/22/2019] levothyroxine tablet 100 mcg  100 mcg Oral Before breakfast Paulie Stevens MD        losartan tablet 100 mg  100 mg Oral Daily Osito Bhat MD   100 mg at 06/21/19 1119    ondansetron disintegrating tablet 8 mg  8 mg Oral Q8H PRN Paulie Stevens MD        paroxetine tablet 20 mg  20 mg Oral BID Paulie Stevens MD   20 mg at 06/21/19 0930    sodium chloride 0.9% flush 10 mL  10 mL Intravenous PRN Paulie Stevens MD         Current Outpatient Medications   Medication Sig Dispense Refill    atorvastatin (LIPITOR) 40 MG tablet Take 40 mg by mouth once daily.       azaTHIOprine (IMURAN) 50 mg Tab Take 50 mg by mouth once daily.      clonazePAM (KLONOPIN) 2 MG Tab Take 1 tablet (2 mg) by mouth every morning and 2 tabs (4 mg) every evening      dextroamphetamine-amphetamine 30 mg Tab Take 30 mg by mouth once daily. On workdays      ergocalciferol (ERGOCALCIFEROL) 50,000 unit Cap Take 50,000 Units by mouth every 7 days.       gabapentin (NEURONTIN) 300 MG capsule Take 300 mg by mouth 3 (three) times daily.      levothyroxine (SYNTHROID) 100 MCG tablet Take 100 mcg by mouth before breakfast.       losartan (COZAAR) 100 MG tablet Take 1 tablet (100 mg total) by mouth once daily. 90 tablet 3    paroxetine (PAXIL-CR) 25 MG 24 hr tablet Take 25 mg by mouth 2 (two) times daily.       RHEUMATE 1-1-500 mg Cap Take 1 capsule by mouth once daily.  12    traMADol (ULTRAM) 50 mg tablet Take 50 mg by mouth every 6 (six) hours as needed for Pain.       triamterene-hydrochlorothiazide 37.5-25 mg (MAXZIDE-25) 37.5-25 mg per tablet Take 1 tablet by mouth once daily. Use as directed 0.5-1 daily or every other day         Interval HPI:   Overnight events:  VS stable    Eatin%  Nausea: No  Hypoglycemia and intervention: No  Fever: No  TPN and/or TF: No      PMH, PSH, FH, SH updated and reviewed     ROS:    Review of Systems   Constitutional: Negative for unexpected weight change.   Eyes: Negative for visual disturbance.   Respiratory: Negative for shortness of breath.    Cardiovascular: Negative for chest pain.   Gastrointestinal: Negative for abdominal pain.   Genitourinary: Negative for urgency.   Musculoskeletal: Negative for arthralgias.   Skin: Negative for wound.   Neurological: Negative for headaches.   Hematological: Does not bruise/bleed easily.   Psychiatric/Behavioral: Negative for sleep disturbance.       PHYSICAL EXAMINATION:  Vitals:    19 1538   BP: 123/61   Pulse: (!) 56   Resp: 18   Temp:      Body mass index is 39.06 kg/m².    Physical Exam   Constitutional: She is oriented to person, place, and time. She appears well-developed and well-nourished.   HENT:   Head: Normocephalic and atraumatic.   Neck: Neck supple. No thyromegaly present.   Cardiovascular: Normal rate, regular rhythm and normal heart sounds.   Pulmonary/Chest: Effort normal. No respiratory distress.   Abdominal: Soft. There is no tenderness.   Neurological: She is alert and oriented to person, place, and time.   Skin: Skin is warm. No rash noted.   Psychiatric: She has a normal mood and affect. Her behavior is normal.     .     ASSESSMENT and PLAN:    Bradycardia  Management as per primary      S/P thyroidectomy  Pt is clinically and bio-chemically hypothyroid.   Bradycardia is most likely due to beta blockers, and less likely due to hypothyroidism.  Her thyroid preparation was recently changed-- about 3-4 weeks ago. Thus would not make any changes to her current thyroid  medications at this time.   Pt can continue to follow up with her endocrinologist as outpatient.           DISCHARGE NEEDS: will assess daily    Gris Scruggs MD  Endocrinology  [facility]

## 2019-06-21 NOTE — NURSING
IV DCed; patient tolerated well. DC information reviewed with patient and patient's wife; both verbalize understanding. Patient DCed via ambulation independently.  with patient as well as belongings.

## 2019-06-21 NOTE — DISCHARGE SUMMARY
[facility] Hospital Medicine  Discharge Summary      Patient Name: Jorge Joshua  MRN: [id number]  Admission Date: 6/20/2019  Hospital Length of Stay: 1 days  Discharge Date and Time:  06/21/2019 3:38 PM  Attending Physician: Wendy Carrington MD   Discharging Provider: Paulie Stevens MD  Primary Care Provider: Fidelina Morales MD  Hospital Medicine Team: [facility] Paulie Stevens MD    HPI:   Patient is a 63 year old female with HTN, HLD, anxiety, fibromyalgia, history of thyroid cancer s/p complete thyroidectomy, and sjogren's who presents to hospital with complaints of slurred speech, lightheadedness, fatigue and recurrent falls. Patient reports that her problem started around noon today when a co-worker of hers felt that the patient had more slurred speech. As the day went on, the patient reports feeling more fatigued and feeling like she is likely to fall. At the time, co-workers noted her to be a bit pale and were having trouble finding a pulse. They called EMT's who suggested that she should come to hospital to be evaluated for this. In ED, she received IVF and .5 mg of atropine as she was found to be bradycardic in the 40's and have a blood pressure of 90/50; although it went as low as 70/42.     Patient has an extensive history of difficulty managing her HTN; she follows with cardiology at [facility]. One week ago she was started on carvedilol but only started taking it yesterday. She was only supposed to take a 12.5 dose but took the whole pill. She is on multiple blood pressure medications including triamterene-hydrochlorothiazide, spironolactone, losartan, amlodipine, and the newly added carvedilol. Patient reports her blood pressures to her cardiologist weekly which has several high readings however her  notes that her blood pressures are always elevated when she is in pain the mornings (170-180 systolic) and normalize throughout the day (130's systolic). In addition,  patient's last fall happened in a similar fashion a few weeks ago in which she accidentally took double the dose of her medications. She and her  report that her pulse is usually in the 50's at home.      At time of exam, patient's blood pressure had increased to 130 systolic and her pulse was 48. She endorsed headache and fatigue. She denied chest pain, SOB, abdominal pain, n/v/d, fevers or chills. Per her , her speech had returned to normal and she was able to walk to the bathroom without difficulty, unassisted.      * No surgery found *      Hospital Course:   Patient admitted to hospital medicine for symptomatic bradycardia and hypotension likely caused by excessive HTN medication. She received atropine and IVF in ED which brought her pulse closer to 50's (her baseline) and blood pressure into normotensive range. She received one more dose of atropine by hospital medicine prior to the night shift. No acute events overnight. CT and MRI showed no concerning changes. Her lipid panel, HA1c were both normal however her TSH was highly elevated. She has a history of thyroid cancer and complete thyroidectomy so endocrine was consulted and asked to leave their recommendations. Patient also underwent Echo and carotid ultrasound. Will discharge after endocrine sees the patient.      Consults:   Consults (From admission, onward)        Status Ordering Provider     Inpatient consult to Endocrinology  Once     Provider:  (Not yet assigned)    Acknowledged RUFINO ROBINS          * Hypotension due to drugs  - Patient is on multiple blood pressure medications at home due to refractory HTN  - Symptoms started after starting carvedilol and previous episodes of hypotensive symptoms appear to have started after accidentally taking double her usual medication  - At home, on triamterene-hydrochlorothiazide, spironolactone, losartan, amlodipine, and carvedilol: held HTN medications initially, patient now mildly  hypertensive  - See hypertension  - See Syncope       Syncope  - Patient has had multiple syncopal episodes in the past, several of which have resulted in falls  - Although these appear to be correlated to her HTN medications, differential diagnosis includes carotid artery stenosis, heart failure, CAD, hypothyroidism, hypoglycemia.  - Will get carotid ultrasound (pending), TSH (highly elevated), lipid panel (normal, on statin, 10 year risk of 3%), HA1c (normal), echocardiogram (pending)  - CT and MRI only show microvascular changes, no large areas of infarct       Bradycardia  - Patient's heart rate is normally in the 50's. She had heart rates as low as 40  - Received .5 mg of atropine in ED, ordered additional dose of atropine   - On telemetry, ordered atropine prn to be used in case of symptomatic bradycardia  - Nursing communication to keep pacer pads on the patient  - Heart rate currently back at baseline      S/P thyroidectomy  - continue home synthroid   - Endocrine consult placed, however she will be transferred to [facility]      Hyperlipidemia  - Continue home statin       Major depression  - Continue home paroxetine      Hypertension  - See Drug induced hypotension  - Initially held all HTN medication; restarted losartan today   - Will require follow up with cardiology/pcp to come up with new HTN regimen       Anxiety  - Patient takes clonazepam 2 mg BID  - Will continue here, however will leave as prn       Final Active Diagnoses:    Diagnosis Date Noted POA    PRINCIPAL PROBLEM:  Hypotension due to drugs [I95.2] 06/20/2019 Unknown    Syncope [R55] 06/20/2019 Unknown    Bradycardia [R00.1] 06/20/2019 Yes    S/P thyroidectomy [Z98.890] 08/24/2017 Not Applicable    Hyperlipidemia [E78.5]  Yes    Anxiety [F41.9]  Yes    Major depression [F32.9]  Yes    Hypertension [I10]  Yes      Problems Resolved During this Admission:       Discharged Condition: stable    Disposition:     Follow Up:    Patient  Instructions:   No discharge procedures on file.    Significant Diagnostic Studies: Labs:   CMP   Recent Labs   Lab 06/20/19  1411 06/21/19  0259    144   K 3.9 3.5    108   CO2 24 25   GLU 84 134*   BUN 27* 22   CREATININE 1.3 0.9   CALCIUM 8.5* 9.0   PROT 5.7*  --    ALBUMIN 3.2* 3.3*   BILITOT 0.6  --    ALKPHOS 81  --    AST 16  --    ALT 13  --    ANIONGAP 10 11   ESTGFRAFRICA 50.8* >60.0   EGFRNONAA 44.1* >60.0    and CBC   Recent Labs   Lab 06/20/19  1411 06/21/19  0259   WBC 8.18 7.89   HGB 10.0* 10.7*   HCT 30.5* 33.7*    289       Pending Diagnostic Studies:     None         Medications:  Reconciled Home Medications:      Medication List      CHANGE how you take these medications    levothyroxine 100 MCG tablet  Commonly known as:  SYNTHROID  Take 100 mcg by mouth before breakfast.  What changed:  Another medication with the same name was removed. Continue taking this medication, and follow the directions you see here.     losartan 100 MG tablet  Commonly known as:  COZAAR  Take 1 tablet (100 mg total) by mouth once daily.  What changed:    · how much to take  · how to take this  · when to take this  · additional instructions     triamterene-hydrochlorothiazide 37.5-25 mg 37.5-25 mg per tablet  Commonly known as:  MAXZIDE-25  Take 1 tablet by mouth once daily. Use as directed 0.5-1 daily or every other day  What changed:  additional instructions        CONTINUE taking these medications    atorvastatin 40 MG tablet  Commonly known as:  LIPITOR  Take 40 mg by mouth once daily.     azaTHIOprine 50 mg Tab  Commonly known as:  IMURAN  Take 50 mg by mouth once daily.     clonazePAM 2 MG Tab  Commonly known as:  KLONOPIN  Take 1 tablet (2 mg) by mouth every morning and 2 tabs (4 mg) every evening     dextroamphetamine-amphetamine 30 mg Tab  Take 30 mg by mouth once daily. On workdays     ergocalciferol 50,000 unit Cap  Commonly known as:  ERGOCALCIFEROL  Take 50,000 Units by mouth every 7  days.     gabapentin 300 MG capsule  Commonly known as:  NEURONTIN  Take 300 mg by mouth 3 (three) times daily.     paroxetine 25 MG 24 hr tablet  Commonly known as:  PAXIL-CR  Take 25 mg by mouth 2 (two) times daily.     RHEUMATE 1-1-500 mg Cap  Generic drug:  [id number]  Take 1 capsule by mouth once daily.     traMADol 50 mg tablet  Commonly known as:  ULTRAM  Take 50 mg by mouth every 6 (six) hours as needed for Pain.        STOP taking these medications    acetaminophen-codeine 300-30mg 300-30 mg Tab  Commonly known as:  TYLENOL #3     amLODIPine 10 MG tablet  Commonly known as:  NORVASC     busPIRone 15 MG tablet  Commonly known as:  BUSPAR     carvedilol 12.5 MG tablet  Commonly known as:  COREG     estradiol 0.05 mg/24 hr td ptwk 0.05 mg/24 hr Ptwk     finasteride 5 mg tablet  Commonly known as:  PROSCAR     methotrexate 2.5 MG Tab     potassium chloride 20 mEq  Commonly known as:  K-TAB     propranolol 20 MG tablet  Commonly known as:  INDERAL     spironolactone 25 MG tablet  Commonly known as:  ALDACTONE     tiZANidine 4 MG tablet  Commonly known as:  ZANAFLEX     venlafaxine 150 MG Cp24  Commonly known as:  EFFEXOR-XR            Indwelling Lines/Drains at time of discharge:   Lines/Drains/Airways          None          Time spent on the discharge of patient: 30 minutes  Patient was seen and examined on the date of discharge and determined to be suitable for discharge.         Paulie Stevens MD  Department of Hospital Medicine  [facility]

## 2019-06-21 NOTE — PROGRESS NOTES
[facility] Hospital Medicine  Progress Note    Patient Name: Jorge Joshua  MRN: [id number]  Patient Class: IP- Inpatient   Admission Date: 6/20/2019  Length of Stay: 1 days  Attending Physician: Wendy Carrington MD  Primary Care Provider: Fidelina Morales MD    [facility] Medicine Team: [facility] HOSP MED 1 Paulie Stevens MD    Subjective:     Principal Problem:Hypotension due to drugs      HPI:  Patient is a 63 year old female with HTN, HLD, anxiety, fibromyalgia, history of thyroid cancer s/p complete thyroidectomy, and sjogren's who presents to hospital with complaints of slurred speech, lightheadedness, fatigue and recurrent falls. Patient reports that her problem started around noon today when a co-worker of hers felt that the patient had more slurred speech. As the day went on, the patient reports feeling more fatigued and feeling like she is likely to fall. At the time, co-workers noted her to be a bit pale and were having trouble finding a pulse. They called EMT's who suggested that she should come to hospital to be evaluated for this. In ED, she received IVF and .5 mg of atropine as she was found to be bradycardic in the 40's and have a blood pressure of 90/50; although it went as low as 70/42.     Patient has an extensive history of difficulty managing her HTN; she follows with cardiology at [facility]. One week ago she was started on carvedilol but only started taking it yesterday. She was only supposed to take a 12.5 dose but took the whole pill. She is on multiple blood pressure medications including triamterene-hydrochlorothiazide, spironolactone, losartan, amlodipine, and the newly added carvedilol. Patient reports her blood pressures to her cardiologist weekly which has several high readings however her  notes that her blood pressures are always elevated when she is in pain the mornings (170-180 systolic) and normalize throughout the day (130's systolic). In addition, patient's last  fall happened in a similar fashion a few weeks ago in which she accidentally took double the dose of her medications. She and her  report that her pulse is usually in the 50's at home.      At time of exam, patient's blood pressure had increased to 130 systolic and her pulse was 48. She endorsed headache and fatigue. She denied chest pain, SOB, abdominal pain, n/v/d, fevers or chills. Per her , her speech had returned to normal and she was able to walk to the bathroom without difficulty, unassisted.      Overview/Hospital Course:  Patient admitted to hospital medicine for symptomatic bradycardia and hypotension likely caused by excessive HTN medication. She received atropine and IVF in ED which brought her pulse closer to 50's (her baseline) and blood pressure into normotensive range. She received one more dose of atropine by hospital medicine prior to the night shift. No acute events overnight. CT and MRI showed no concerning changes. Her lipid panel, HA1c were both normal however her TSH was highly elevated. She has a history of thyroid cancer and complete thyroidectomy so endocrine was consulted; along with having a pending TTE and carotid ultrasound. However, work up could not be completed because patient became frustrated by lack of room availability and arranged her own transfer to [facility] through an acquaintance of hers. She was accepted and transfer orders were placed.      Interval History: No acute events overnight. Patient is able to ambulate to the bathroom on her own, She feels generalized myalgias but this is similar to her symptoms at home. Blood pressure now mildly hypertensive as they tend to be in the mornings.     Review of Systems   Constitutional: Positive for fatigue. Negative for chills and fever.   Respiratory: Negative for cough, chest tightness and shortness of breath.    Cardiovascular: Negative for chest pain, palpitations and leg swelling.   Gastrointestinal: Negative for  abdominal pain, constipation, diarrhea and nausea.   Musculoskeletal: Negative for arthralgias, back pain and myalgias.   Neurological: Positive for headaches (mild). Negative for dizziness and weakness.     Objective:     Vital Signs (Most Recent):  Temp: 98.3 °F (36.8 °C) (06/21/19 0709)  Pulse: (!) 53 (06/21/19 0950)  Resp: 16 (06/21/19 0950)  BP: (!) 160/73 (06/21/19 0950)  SpO2: 95 % (06/21/19 0950) Vital Signs (24h Range):  Temp:  [97.8 °F (36.6 °C)-98.3 °F (36.8 °C)] 98.3 °F (36.8 °C)  Pulse:  [40-60] 53  Resp:  [16-20] 16  SpO2:  [93 %-100 %] 95 %  BP: ()/(42-85) 160/73     Weight: 90.7 kg (200 lb)  Body mass index is 39.06 kg/m².    Intake/Output Summary (Last 24 hours) at 6/21/2019 1157  Last data filed at 6/20/2019 1641  Gross per 24 hour   Intake 1000 ml   Output --   Net 1000 ml      Physical Exam   Constitutional: She is oriented to person, place, and time. She appears well-developed and well-nourished. No distress.   HENT:   Head: Normocephalic and atraumatic.   Eyes: Pupils are equal, round, and reactive to light. EOM are normal.   Neck: Normal range of motion. Neck supple. No JVD present.   Cardiovascular: Normal rate, regular rhythm, normal heart sounds and intact distal pulses.   Pulmonary/Chest: Effort normal and breath sounds normal.   Abdominal: Soft. Bowel sounds are normal.   Musculoskeletal: Normal range of motion. She exhibits no edema or deformity.   Neurological: She is alert and oriented to person, place, and time. No cranial nerve deficit.   Skin: Skin is warm and dry.   Vitals reviewed.      Significant Labs: All pertinent labs within the past 24 hours have been reviewed.    Significant Imaging: I have reviewed all pertinent imaging results/findings within the past 24 hours.      Assessment/Plan:      * Hypotension due to drugs  - Patient is on multiple blood pressure medications at home due to refractory HTN  - Symptoms started after starting carvedilol and previous episodes of  hypotensive symptoms appear to have started after accidentally taking double her usual medication  - At home, on triamterene-hydrochlorothiazide, spironolactone, losartan, amlodipine, and carvedilol: held HTN medications initially, patient now mildly hypertensive  - See hypertension  - See Syncope       Syncope  - Patient has had multiple syncopal episodes in the past, several of which have resulted in falls  - Although these appear to be correlated to her HTN medications, differential diagnosis includes carotid artery stenosis, heart failure, CAD, hypothyroidism, hypoglycemia.  - Will get carotid ultrasound (pending), TSH (highly elevated), lipid panel (normal, on statin, 10 year risk of 3%), HA1c (normal), echocardiogram (pending)  - CT and MRI only show microvascular changes, no large areas of infarct       Bradycardia  - Patient's heart rate is normally in the 50's. She had heart rates as low as 40  - Received .5 mg of atropine in ED, ordered additional dose of atropine   - On telemetry, ordered atropine prn to be used in case of symptomatic bradycardia  - Nursing communication to keep pacer pads on the patient  - Heart rate currently back at baseline      S/P thyroidectomy  - continue home synthroid   - Endocrine consult placed, however she will be transferred to [facility]      Hyperlipidemia  - Continue home statin       Major depression  - Continue home paroxetine      Hypertension  - See Drug induced hypotension  - Initially held all HTN medication; restarted losartan today   - Will require follow up with cardiology/pcp to come up with new HTN regimen       Anxiety  - Patient takes clonazepam 2 mg BID  - Will continue here, however will leave as prn       VTE Risk Mitigation (From admission, onward)        Ordered     enoxaparin injection 40 mg  Daily      06/20/19 1738     IP VTE HIGH RISK PATIENT  Once      06/20/19 1738                Paulie Stevens MD  Department of Hospital Medicine   [facility]  [place]

## 2019-06-21 NOTE — ASSESSMENT & PLAN NOTE
- See Drug induced hypotension  - Initially held all HTN medication; restarted losartan today   - Will require follow up with cardiology/pcp to come up with new HTN regimen

## 2019-06-21 NOTE — ASSESSMENT & PLAN NOTE
- Patient's heart rate is normally in the 50's. She had heart rates as low as 40  - Received .5 mg of atropine in ED, ordered additional dose of atropine   - On telemetry, ordered atropine prn to be used in case of symptomatic bradycardia  - Nursing communication to keep pacer pads on the patient  - Heart rate currently back at baseline

## 2019-06-21 NOTE — ASSESSMENT & PLAN NOTE
- Patient is on multiple blood pressure medications at home due to refractory HTN  - Symptoms started after starting carvedilol and previous episodes of hypotensive symptoms appear to have started after accidentally taking double her usual medication  - At home, on triamterene-hydrochlorothiazide, spironolactone, losartan, amlodipine, and carvedilol: held HTN medications initially, patient now mildly hypertensive  - See hypertension  - See Syncope

## 2019-06-21 NOTE — PHARMACY MED REC
"Admission Medication Reconciliation - Pharmacy Consult Note    The home medication history was taken by Marek Sutton, student pharmacist.  Based on information gathered and subsequent review by the clinical pharmacist, the items below may need attention.     You may go to "Admission" then "Reconcile Home Medications" tabs to review and/or act upon these items.     Potentially problematic discrepancies with current MAR  o Patient IS taking the following which was not ordered upon admit  o Amlodipine 10 mg oral QHS - held on admission for hypotension  o Losartan 100 mg oral in AM and 50 mg oral QHS - held on admission for hypotension  o Triamterene / hydrochlorothiazide 37.5 / 25 mg oral daily - held on admission for hypotension  o Carvedilol 12.5 mg oral BID (patient reports taking one dose) - held on admission for bradycardia and hypotension  o Patient is taking a drug DIFFERENTLY than how ordered upon admit  o Patient reports taking azathioprine 50 mg oral daily; azathioprine 100 mg oral daily ordered   o Patient reports taking clonazepam 2 mg oral in AM and 4 mg oral QHS; clonazepam 2 mg oral BIDprn anxiety ordered  o Patient reports taking levothyroxine 100 mcg oral daily; levothyroxine 125 mcg oral daily ordered  o Patient reports taking paroxetine 20 mg oral twice daily; paroxetine 20 mg oral daily ordered    Please address this information as you see fit.  Feel free to contact us if you have any questions or require assistance.    Thank you,   Aleksandra Lay, PharmD  Emergency Medicine Clinical Pharmacist  [phone] (2pm-midnight daily)      .    .            "

## 2019-06-21 NOTE — ASSESSMENT & PLAN NOTE
- continue home synthroid   - Endocrine consult placed, however she will be transferred to [facility]

## 2019-06-21 NOTE — SUBJECTIVE & OBJECTIVE
Interval History: No acute events overnight. Patient is able to ambulate to the bathroom on her own, She feels generalized myalgias but this is similar to her symptoms at home. Blood pressure now mildly hypertensive as they tend to be in the mornings.     Review of Systems   Constitutional: Positive for fatigue. Negative for chills and fever.   Respiratory: Negative for cough, chest tightness and shortness of breath.    Cardiovascular: Negative for chest pain, palpitations and leg swelling.   Gastrointestinal: Negative for abdominal pain, constipation, diarrhea and nausea.   Musculoskeletal: Negative for arthralgias, back pain and myalgias.   Neurological: Positive for headaches (mild). Negative for dizziness and weakness.     Objective:     Vital Signs (Most Recent):  Temp: 98.3 °F (36.8 °C) (06/21/19 0709)  Pulse: (!) 53 (06/21/19 0950)  Resp: 16 (06/21/19 0950)  BP: (!) 160/73 (06/21/19 0950)  SpO2: 95 % (06/21/19 0950) Vital Signs (24h Range):  Temp:  [97.8 °F (36.6 °C)-98.3 °F (36.8 °C)] 98.3 °F (36.8 °C)  Pulse:  [40-60] 53  Resp:  [16-20] 16  SpO2:  [93 %-100 %] 95 %  BP: ()/(42-85) 160/73     Weight: 90.7 kg (200 lb)  Body mass index is 39.06 kg/m².    Intake/Output Summary (Last 24 hours) at 6/21/2019 1157  Last data filed at 6/20/2019 1641  Gross per 24 hour   Intake 1000 ml   Output --   Net 1000 ml      Physical Exam   Constitutional: She is oriented to person, place, and time. She appears well-developed and well-nourished. No distress.   HENT:   Head: Normocephalic and atraumatic.   Eyes: Pupils are equal, round, and reactive to light. EOM are normal.   Neck: Normal range of motion. Neck supple. No JVD present.   Cardiovascular: Normal rate, regular rhythm, normal heart sounds and intact distal pulses.   Pulmonary/Chest: Effort normal and breath sounds normal.   Abdominal: Soft. Bowel sounds are normal.   Musculoskeletal: Normal range of motion. She exhibits no edema or deformity.   Neurological:  She is alert and oriented to person, place, and time. No cranial nerve deficit.   Skin: Skin is warm and dry.   Vitals reviewed.      Significant Labs: All pertinent labs within the past 24 hours have been reviewed.    Significant Imaging: I have reviewed all pertinent imaging results/findings within the past 24 hours.

## 2019-06-21 NOTE — ASSESSMENT & PLAN NOTE
- continue home synthroid   - Endocrine consult placed, however she will be transferred to [facility]

## 2019-06-21 NOTE — ASSESSMENT & PLAN NOTE
Pt is clinically and bio-chemically hypothyroid.   Bradycardia is most likely due to beta blockers, and less likely due to hypothyroidism.  Her thyroid preparation was recently changed-- about 3-4 weeks ago. Thus would not make any changes to her current thyroid medications at this time.   Pt can continue to follow up with her endocrinologist as outpatient.

## 2019-06-24 ENCOUNTER — TELEPHONE (OUTPATIENT)
Dept: CARDIOLOGY | Facility: CLINIC | Age: 64
End: 2019-06-24

## 2019-06-24 NOTE — TELEPHONE ENCOUNTER
----- Message from Rafal Morrell MD sent at 6/24/2019  1:41 PM CDT -----  Contact: pt   Let's watch a week or so as it seems like some are too low and some too high. Maybe for now take the Maxide and spironolactone in am and the Cozaar at nite,CJL  ----- Message -----  From: Marii Harry RN  Sent: 6/24/2019  12:22 PM  To: Rafal Morrell MD     Pt was in ER observation for 2 days. ER Md stop Carvedilol and Norvasc. Last night her BP was 70/48. Today before medication BP was 148/100 and is now 137/90. She took Spirolactone 25 mg, Mazide and Cozaar. Please advise.  ----- Message -----  From: Dianna Maynard  Sent: 6/24/2019  10:21 AM  To: Marii Harry RN    Pt says she was in the hospital Thursday/Friday because she kept passing out, heart rate and pressure was low. She says they told her the beta blocker was casing this to happen. She would like a call to discuss her meds.  LOV 2/18/19 Dr. Morrell  [facility] - [address] [phone] (Phone)  [phone] (Fax)    Thanks

## 2019-06-24 NOTE — TELEPHONE ENCOUNTER
Pt notified to monitor BP for 1 week and to take the Maxide and spironolactone in am and the Cozaar at nite per Dr Morrell. Pt verbalized knowledge.

## 2019-06-24 NOTE — PLAN OF CARE
Patient discharged home with family.    Future Appointments   Date Time Provider Department Center   6/24/2019 11:00 AM [facility]        06/24/19 0801   Final Note   Assessment Type Final Discharge Note   Anticipated Discharge Disposition Home   Right Care Referral Info   Post Acute Recommendation No Care

## 2019-06-25 LAB
BACTERIA BLD CULT: NORMAL
BACTERIA BLD CULT: NORMAL

## 2019-07-01 ENCOUNTER — TELEPHONE (OUTPATIENT)
Dept: FAMILY MEDICINE | Facility: CLINIC | Age: 64
End: 2019-07-01

## 2019-07-02 ENCOUNTER — OFFICE VISIT (OUTPATIENT)
Dept: FAMILY MEDICINE | Facility: CLINIC | Age: 64
End: 2019-07-02
Payer: COMMERCIAL

## 2019-07-02 VITALS
OXYGEN SATURATION: 97 % | HEART RATE: 87 BPM | BODY MASS INDEX: 37.6 KG/M2 | SYSTOLIC BLOOD PRESSURE: 136 MMHG | HEIGHT: 60 IN | WEIGHT: 191.5 LBS | TEMPERATURE: 98 F | DIASTOLIC BLOOD PRESSURE: 84 MMHG

## 2019-07-02 DIAGNOSIS — R55 SYNCOPE, UNSPECIFIED SYNCOPE TYPE: Primary | ICD-10-CM

## 2019-07-02 DIAGNOSIS — E66.01 CLASS 2 SEVERE OBESITY DUE TO EXCESS CALORIES WITH SERIOUS COMORBIDITY AND BODY MASS INDEX (BMI) OF 37.0 TO 37.9 IN ADULT: Chronic | ICD-10-CM

## 2019-07-02 DIAGNOSIS — E78.5 DYSLIPIDEMIA: Chronic | ICD-10-CM

## 2019-07-02 DIAGNOSIS — I10 ESSENTIAL HYPERTENSION: Chronic | ICD-10-CM

## 2019-07-02 DIAGNOSIS — H35.00 RETINOPATHY: ICD-10-CM

## 2019-07-02 DIAGNOSIS — E03.9 ACQUIRED HYPOTHYROIDISM: ICD-10-CM

## 2019-07-02 DIAGNOSIS — I95.2 HYPOTENSION DUE TO DRUGS: ICD-10-CM

## 2019-07-02 DIAGNOSIS — M35.00 SJOGREN'S SYNDROME, WITH UNSPECIFIED ORGAN INVOLVEMENT: ICD-10-CM

## 2019-07-02 DIAGNOSIS — I35.0 NONRHEUMATIC AORTIC VALVE STENOSIS: ICD-10-CM

## 2019-07-02 PROCEDURE — 99204 PR OFFICE/OUTPT VISIT, NEW, LEVL IV, 45-59 MIN: ICD-10-PCS | Mod: S$GLB,,, | Performed by: INTERNAL MEDICINE

## 2019-07-02 PROCEDURE — 99999 PR PBB SHADOW E&M-EST. PATIENT-LVL III: CPT | Mod: PBBFAC,,, | Performed by: INTERNAL MEDICINE

## 2019-07-02 PROCEDURE — 99204 OFFICE O/P NEW MOD 45 MIN: CPT | Mod: S$GLB,,, | Performed by: INTERNAL MEDICINE

## 2019-07-02 PROCEDURE — 99999 PR PBB SHADOW E&M-EST. PATIENT-LVL III: ICD-10-PCS | Mod: PBBFAC,,, | Performed by: INTERNAL MEDICINE

## 2019-07-02 NOTE — PROGRESS NOTES
Subjective:       Chief Complaint  Chief Complaint   Patient presents with    Hospital Follow Up       HPI  Jorge Joshua is a 63 y.o. female with multiple medical diagnoses as listed in the medical history and problem list that presents for hospital follow-up.    Hospital: [facility]  Admitted 6/29  Discharged 6/29    Admitted for syncopal episodes  Per [facility] ED: Patient presenting with several episodes of syncope within the last 2 weeks. Patient had admitted to [facility] for workup that was negative. She had syncopal episode again 3 days ago. CT of the head is negative for any acute pathology. Labs show slight elevation of BUN and possible dehydration. However, the patient reports that that is are normal renal function. She had blood drawn today for follow-up with endocrinologist, Dr. Rose. She also had ultrasound of her thyroid which showed normal postoperative changes. I informed her all test results and I suggested she follow up with her cardiologist arrange for Holter monitoring. She tells me she sees Dr. Taveras at [facility] but states that she needs to find a new cardiologist. I will refer her to Dr. Adeola Yancey MD 06/29 1318   Patient requesting pain medication for her headache but drove herself to the hospital. I informed her that I could give her shot of Toradol which she said would not help. I agreed to give her prescription of Norco until she can follow up with her primary care doctor. 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  Yes.  Diagnostic tests reviewed/disposition: No diagnosic tests pending after this hospitalization.  Disease/illness education: discussed  Home health/community services discussion/referrals: Patient does not have home health established from hospital visit.  They do not need home health.  If needed, we will set up home health for the patient.   Establishment or re-establishment of referral orders for community resources: No other necessary community resources.   Discussion with other health care providers: No discussion with other health care providers necessary.       Patient Care Team:  Santos Cortez MD as PCP - General (Internal Medicine)  Beth Acharya MD as Consulting Physician (Hematology and Oncology)  Mj Rose MD as Consulting Physician (Endocrinology)      PAST MEDICAL HISTORY:  Past Medical History:   Diagnosis Date    Anxiety     Cancer     thyroid    Chronic low back pain     Depression     Heart murmur     Hyperlipidemia     Hypertension     Menorrhagia     OA (osteoarthritis) of knee     Obesity     Personal history of colonic polyps        PAST SURGICAL HISTORY:  Past Surgical History:   Procedure Laterality Date    breast reduction       SECTION, LOW TRANSVERSE      HYSTERECTOMY  2012    ROTATOR CUFF REPAIR      TOTAL KNEE ARTHROPLASTY      left       SOCIAL HISTORY:  Social History     Socioeconomic History    Marital status:      Spouse name: kelly    Number of children: 2    Years of education: 12    Highest education level: Not on file   Occupational History    Occupation:      Employer: [employer]   Social Needs    Financial  resource strain: Not on file    Food insecurity:     Worry: Not on file     Inability: Not on file    Transportation needs:     Medical: Not on file     Non-medical: Not on file   Tobacco Use    Smoking status: Former Smoker     Types: Cigarettes     Last attempt to quit: 2012     Years since quittin.9    Smokeless tobacco: Never Used   Substance and Sexual Activity    Alcohol use: No    Drug use: No    Sexual activity: Yes     Partners: Male   Lifestyle    Physical activity:     Days per week: Not on file     Minutes per session: Not on file    Stress: Not on file   Relationships    Social connections:     Talks on phone: Not on file     Gets together: Not on file     Attends Yazidism service: Not on file     Active member of club or organization: Not on file     Attends meetings of clubs or organizations: Not on file     Relationship status: Not on file   Other Topics Concern    Not on file   Social History Narrative    Not on file       FAMILY HISTORY:  Family History   Problem Relation Age of Onset    Hypertension Mother     Colon cancer Father         colon    Alzheimer's disease Father        ALLERGIES AND MEDICATIONS: updated and reviewed.  Review of patient's allergies indicates:  No Known Allergies  Current Outpatient Medications   Medication Sig Dispense Refill    atorvastatin (LIPITOR) 40 MG tablet Take 40 mg by mouth once daily.       azaTHIOprine (IMURAN) 50 mg Tab Take 50 mg by mouth once daily.      clonazePAM (KLONOPIN) 2 MG Tab Take 1 tablet (2 mg) by mouth every morning and 2 tabs (4 mg) every evening      dextroamphetamine-amphetamine 30 mg Tab Take 30 mg by mouth once daily. On workdays      ergocalciferol (ERGOCALCIFEROL) 50,000 unit Cap Take 50,000 Units by mouth every 7 days.       levothyroxine (SYNTHROID) 100 MCG tablet Take 100 mcg by mouth before breakfast.       losartan (COZAAR) 100 MG tablet Take 1 tablet (100 mg total) by mouth once daily. 90 tablet 3     paroxetine (PAXIL-CR) 25 MG 24 hr tablet Take 25 mg by mouth 2 (two) times daily.       RHEUMATE 1-1-500 mg Cap Take 1 capsule by mouth once daily.  12    triamterene-hydrochlorothiazide 37.5-25 mg (MAXZIDE-25) 37.5-25 mg per tablet Take 1 tablet by mouth once daily. Use as directed 0.5-1 daily or every other day       No current facility-administered medications for this visit.          ROS  Review of Systems   Constitutional: Negative for appetite change, chills, fever and unexpected weight change.   Eyes: Positive for visual disturbance (halos/starbursts).   Respiratory: Negative for cough and shortness of breath.    Cardiovascular: Negative for chest pain, palpitations and leg swelling.   Gastrointestinal: Negative for abdominal pain, constipation, diarrhea, nausea and vomiting.   Musculoskeletal: Negative.    Skin: Negative.    Neurological: Negative for dizziness, light-headedness and headaches.   Psychiatric/Behavioral: Negative for dysphoric mood. The patient is not nervous/anxious.          Objective:       Physical Exam  Vitals:    07/02/19 1416   BP: 136/84   BP Location: Right arm   Patient Position: Sitting   BP Method: Medium (Manual)   Pulse: 87   Temp: 97.7 °F (36.5 °C)   TempSrc: Oral   SpO2: 97%   Weight: 86.8 kg (191 lb 7.5 oz)   Height: 5' (1.524 m)    Body mass index is 37.39 kg/m².  Weight: 86.8 kg (191 lb 7.5 oz)   Height: 5' (152.4 cm)   Physical Exam   Constitutional: She appears well-developed. No distress.   HENT:   Head: Normocephalic and atraumatic.   Eyes: Pupils are equal, round, and reactive to light. EOM are normal.   Notable visual field deficits of bilateral eyes   Neck: Normal range of motion. Neck supple. No thyromegaly present.   Cardiovascular: Normal rate, normal heart sounds and intact distal pulses. Exam reveals no friction rub.   No murmur heard.  Pulmonary/Chest: Effort normal and breath sounds normal. No stridor. No respiratory distress. She has no wheezes.    Musculoskeletal: She exhibits no edema or deformity.   Lymphadenopathy:     She has no cervical adenopathy.   Neurological: She is alert. No cranial nerve deficit.   Skin: Skin is warm and dry.   Psychiatric: She has a normal mood and affect. Her behavior is normal.   Vitals reviewed.          Assessment:     1. Syncope, unspecified syncope type    2. Nonrheumatic aortic valve stenosis    3. Essential hypertension    4. Dyslipidemia    5. Class 2 severe obesity due to excess calories with serious comorbidity and body mass index (BMI) of 37.0 to 37.9 in adult    6. Acquired hypothyroidism    7. Hypotension due to drugs    8. Sjogren's syndrome, with unspecified organ involvement      Plan:     Jorge was seen today for hospital follow up.    Diagnoses and all orders for this visit:    Syncope, unspecified syncope type  Hypotension due to drugs  Discontinued gabapentin presently   Needs Holter   Will follow-up with Cardiology thereafter   -     Holter monitor - 48 hour; Future    Acquired hypothyroidism  Discussed TSH out of range - currently taking levothyroxine formulation per Endo  Will touch base with Endo/Dr Rose    Cancer-associated retinopathy  May be considering Alemtuzumab therapy for treatment (discussed in April 2019 per Dr Acharya)  Following up with Rheumatology/Dr Flynn Hendricks    Essential hypertension  BP controlled presently - reviewed anti-hypertensive regimen - continue current therapy    Dyslipidemia  Lipids controlled presently - reviewed anti-hyperlipidemic regimen - continue current therapy    Class 2 severe obesity due to excess calories with serious comorbidity and body mass index (BMI) of 37.0 to 37.9 in adult  Body mass index is 37.39 kg/m².  Readdress future visits plan for weight reduction for improvement of chronic metabolic disease and modification of risk factors for cardiovascular problems    Follow up in about 1 month (around 7/30/2019) for f/u syncope.    The patient expressed  understanding and no barriers to adherence were identified.     1. The patient indicates understanding of these issues and agrees with the plan. Brief care plan is updated and reviewed with the patient as applicable.     2. The patient is given an After Visit Summary that lists all medications with directions, allergies, orders placed during this encounter and follow-up instructions.     3. I have reviewed the patient's medical information including past medical, family, and social history sections including the medications and allergies.     4. We discussed the patient's current medications. I reconciled the patient's medication list and prepared and supplied needed refills.       Santos Cortez MD  Internal Medicine-Pediatrics

## 2019-07-05 PROBLEM — M35.00 SJOGREN'S SYNDROME: Status: ACTIVE | Noted: 2019-07-05

## 2019-07-05 PROBLEM — E03.9 ACQUIRED HYPOTHYROIDISM: Status: ACTIVE | Noted: 2017-08-24

## 2019-07-05 PROBLEM — E03.9 ACQUIRED HYPOTHYROIDISM: Chronic | Status: ACTIVE | Noted: 2017-08-24

## 2019-07-05 PROBLEM — H35.00 RETINOPATHY: Status: ACTIVE | Noted: 2019-07-05

## 2019-07-08 ENCOUNTER — TELEPHONE (OUTPATIENT)
Dept: FAMILY MEDICINE | Facility: CLINIC | Age: 64
End: 2019-07-08

## 2019-07-08 ENCOUNTER — PATIENT MESSAGE (OUTPATIENT)
Dept: CARDIOLOGY | Facility: CLINIC | Age: 64
End: 2019-07-08

## 2019-07-08 NOTE — TELEPHONE ENCOUNTER
----- Message from Jacqueline Serrano sent at 7/8/2019  3:27 PM CDT -----  Contact: Self/  [phone]  Type: Patient Call Back    Who called:  Patient    What is the request in detail:  Patient would like staff to give her a call regarding the heart monitor she was suppose to receive.  Thank you    Would the patient rather a call back or a response via [facility]?   Call back    Best call back number:  [phone]

## 2019-07-08 NOTE — TELEPHONE ENCOUNTER
Called cardiac lab to discuss scheduling for Holter   Patient should go to Cardiology department at 830A at [facility] ([address]) to  Holter monitor

## 2019-07-08 NOTE — TELEPHONE ENCOUNTER
----- Message from Paty Scott LPN sent at 7/8/2019  3:45 PM CDT -----  Contact: Self/  [phone]      ----- Message -----  From: Jacqueline Serrano  Sent: 7/8/2019   3:27 PM  To: Diego Graham Staff    Type: Patient Call Back    Who called:  Patient    What is the request in detail:  Patient would like staff to give her a call regarding the heart monitor she was suppose to receive.  Thank you    Would the patient rather a call back or a response via [facility]?   Call back    Best call back number:  [phone]

## 2019-07-09 ENCOUNTER — TELEPHONE (OUTPATIENT)
Dept: FAMILY MEDICINE | Facility: CLINIC | Age: 64
End: 2019-07-09

## 2019-07-09 NOTE — TELEPHONE ENCOUNTER
Called pt. Back spoke with her regarding Holter Monitor placement appt. This morning at 8:30 am, pt. Cant make that time due to she does not drive. Phone number given so pt. Can schedule her appt.

## 2019-07-09 NOTE — TELEPHONE ENCOUNTER
----- Message from Francisca Bauer sent at 7/9/2019  7:57 AM CDT -----  Contact: Self   Type:  Patient Returning Call    Who Called: Self     Who Left Message for Patient: Bárbara    Does the patient know what this is regarding?: yes, heart monitor    Would the patient rather a call back or a response via [facility]? call    Best Call Back Number:[phone]

## 2019-07-09 NOTE — TELEPHONE ENCOUNTER
----- Message from Francisca Bauer sent at 7/9/2019  7:57 AM CDT -----  Contact: Self   Type:  Patient Returning Call    Who Called: Self     Who Left Message for Patient: Bárbara    Does the patient know what this is regarding?: yes, heart monitor    Would the patient rather a call back or a response via [facility]? call    Best Call Back Number:[phone]

## 2019-07-18 ENCOUNTER — PATIENT MESSAGE (OUTPATIENT)
Dept: FAMILY MEDICINE | Facility: CLINIC | Age: 64
End: 2019-07-18

## 2019-07-19 ENCOUNTER — PATIENT MESSAGE (OUTPATIENT)
Dept: CARDIOLOGY | Facility: CLINIC | Age: 64
End: 2019-07-19

## 2019-07-24 ENCOUNTER — PATIENT OUTREACH (OUTPATIENT)
Dept: ADMINISTRATIVE | Facility: HOSPITAL | Age: 64
End: 2019-07-24

## 2019-07-24 RX ORDER — LEVOTHYROXINE SODIUM 44 UG/1
CAPSULE ORAL
Refills: 3 | COMMUNITY
Start: 2019-07-16 | End: 2019-09-14

## 2019-09-10 ENCOUNTER — TELEPHONE (OUTPATIENT)
Dept: ADMINISTRATIVE | Facility: HOSPITAL | Age: 64
End: 2019-09-10

## 2019-09-10 RX ORDER — OXYCODONE AND ACETAMINOPHEN 5; 325 MG/1; MG/1
1 TABLET ORAL EVERY 6 HOURS PRN
Refills: 0 | COMMUNITY
Start: 2019-08-30 | End: 2019-09-14

## 2019-09-10 RX ORDER — GABAPENTIN 300 MG/1
300 CAPSULE ORAL
COMMUNITY
Start: 2019-08-30 | End: 2019-09-14

## 2019-09-10 RX ORDER — ASPIRIN 325 MG
325 TABLET, DELAYED RELEASE (ENTERIC COATED) ORAL
COMMUNITY
Start: 2019-08-30 | End: 2019-09-29

## 2019-09-10 RX ORDER — CYCLOBENZAPRINE HCL 10 MG
10 TABLET ORAL NIGHTLY
Refills: 2 | COMMUNITY
Start: 2019-07-24 | End: 2019-09-14

## 2019-09-10 RX ORDER — HYDROCODONE BITARTRATE AND ACETAMINOPHEN 7.5; 325 MG/1; MG/1
TABLET ORAL
COMMUNITY
Start: 2019-09-04 | End: 2019-09-17

## 2019-09-12 ENCOUNTER — TELEPHONE (OUTPATIENT)
Dept: ADMINISTRATIVE | Facility: HOSPITAL | Age: 64
End: 2019-09-12

## 2019-09-13 ENCOUNTER — TELEPHONE (OUTPATIENT)
Dept: FAMILY MEDICINE | Facility: CLINIC | Age: 64
End: 2019-09-13

## 2019-09-13 NOTE — TELEPHONE ENCOUNTER
Sent patient [facility] message - I reviewed hospital records through [facility] (hospitalized at [facility] 8/19 - 830 for trimalleolar fracture of ankle)

## 2019-09-13 NOTE — TELEPHONE ENCOUNTER
----- Message from Monserrat Marsh sent at 9/13/2019 12:32 PM CDT -----  Contact: Patient   Type: Patient Call Back    Who called: Patient     What is the request in detail: pt is requesting a call back from the doctor to discuss some health concerns she has.     Can the clinic reply by [facility]? No     Would the patient rather a call back or a response via [facility]? Call back     Best call back number:[phone]

## 2019-09-14 ENCOUNTER — NURSE TRIAGE (OUTPATIENT)
Dept: ADMINISTRATIVE | Facility: CLINIC | Age: 64
End: 2019-09-14

## 2019-09-14 ENCOUNTER — HOSPITAL ENCOUNTER (EMERGENCY)
Facility: HOSPITAL | Age: 64
Discharge: HOME | End: 2019-09-14
Attending: EMERGENCY MEDICINE
Payer: COMMERCIAL

## 2019-09-14 VITALS
TEMPERATURE: 98 F | WEIGHT: 179 LBS | HEIGHT: 60 IN | SYSTOLIC BLOOD PRESSURE: 166 MMHG | OXYGEN SATURATION: 98 % | BODY MASS INDEX: 35.14 KG/M2 | DIASTOLIC BLOOD PRESSURE: 82 MMHG | RESPIRATION RATE: 18 BRPM | HEART RATE: 55 BPM

## 2019-09-14 DIAGNOSIS — T50.901A ACCIDENTAL DRUG OVERDOSE, INITIAL ENCOUNTER: Primary | ICD-10-CM

## 2019-09-14 DIAGNOSIS — R53.1 WEAKNESS: ICD-10-CM

## 2019-09-14 LAB
ALBUMIN SERPL-MCNC: 3.2 G/DL (ref 3.3–5.5)
ALP SERPL-CCNC: 97 U/L (ref 42–141)
BILIRUB SERPL-MCNC: 0.6 MG/DL (ref 0.2–1.6)
BILIRUBIN, POC UA: NEGATIVE
BLOOD, POC UA: ABNORMAL
BUN SERPL-MCNC: 31 MG/DL (ref 7–22)
CALCIUM SERPL-MCNC: 9.4 MG/DL (ref 8–10.3)
CHLORIDE SERPL-SCNC: 103 MMOL/L (ref 98–108)
CLARITY, POC UA: CLEAR
COLOR, POC UA: YELLOW
CREAT SERPL-MCNC: 1.4 MG/DL (ref 0.6–1.2)
GLUCOSE SERPL-MCNC: 102 MG/DL (ref 73–118)
GLUCOSE, POC UA: NEGATIVE
KETONES, POC UA: NEGATIVE
LEUKOCYTE EST, POC UA: NEGATIVE
NITRITE, POC UA: NEGATIVE
PH UR STRIP: 5 [PH]
POC ALT (SGPT): 15 U/L (ref 10–47)
POC AST (SGOT): 24 U/L (ref 11–38)
POC B-TYPE NATRIURETIC PEPTIDE: 103 PG/ML (ref 0–100)
POC CARDIAC TROPONIN I: 0.01 NG/ML
POC D-DI: 921 NG/ML (ref 0–450)
POC TCO2: 27 MMOL/L (ref 18–33)
POTASSIUM BLD-SCNC: 3.7 MMOL/L (ref 3.6–5.1)
PROTEIN, POC UA: NEGATIVE
PROTEIN, POC: 6.3 G/DL (ref 6.4–8.1)
SAMPLE: NORMAL
SODIUM BLD-SCNC: 137 MMOL/L (ref 128–145)
SPECIFIC GRAVITY, POC UA: <=1.005
UROBILINOGEN, POC UA: 0.2 E.U./DL

## 2019-09-14 PROCEDURE — 25500020 PHARM REV CODE 255: Mod: ER | Performed by: INTERNAL MEDICINE

## 2019-09-14 PROCEDURE — 83880 ASSAY OF NATRIURETIC PEPTIDE: CPT | Mod: ER

## 2019-09-14 PROCEDURE — 84484 ASSAY OF TROPONIN QUANT: CPT | Mod: ER

## 2019-09-14 PROCEDURE — 85379 FIBRIN DEGRADATION QUANT: CPT | Mod: ER

## 2019-09-14 PROCEDURE — 93005 ELECTROCARDIOGRAM TRACING: CPT | Mod: ER

## 2019-09-14 PROCEDURE — 81003 URINALYSIS AUTO W/O SCOPE: CPT | Mod: ER

## 2019-09-14 PROCEDURE — 85025 COMPLETE CBC W/AUTO DIFF WBC: CPT | Mod: ER

## 2019-09-14 PROCEDURE — 96361 HYDRATE IV INFUSION ADD-ON: CPT | Mod: ER

## 2019-09-14 PROCEDURE — 99291 CRITICAL CARE FIRST HOUR: CPT | Mod: 25,ER

## 2019-09-14 PROCEDURE — 96360 HYDRATION IV INFUSION INIT: CPT | Mod: ER

## 2019-09-14 PROCEDURE — 80053 COMPREHEN METABOLIC PANEL: CPT | Mod: ER

## 2019-09-14 PROCEDURE — 63600175 PHARM REV CODE 636 W HCPCS: Mod: ER | Performed by: EMERGENCY MEDICINE

## 2019-09-14 PROCEDURE — 93010 ELECTROCARDIOGRAM REPORT: CPT | Mod: ,,, | Performed by: INTERNAL MEDICINE

## 2019-09-14 PROCEDURE — 93010 EKG 12-LEAD: ICD-10-PCS | Mod: ,,, | Performed by: INTERNAL MEDICINE

## 2019-09-14 RX ORDER — SODIUM CHLORIDE 9 MG/ML
1000 INJECTION, SOLUTION INTRAVENOUS
Status: COMPLETED | OUTPATIENT
Start: 2019-09-14 | End: 2019-09-14

## 2019-09-14 RX ADMIN — SODIUM CHLORIDE 1000 ML: 0.9 INJECTION, SOLUTION INTRAVENOUS at 04:09

## 2019-09-14 RX ADMIN — SODIUM CHLORIDE 1000 ML: 0.9 INJECTION, SOLUTION INTRAVENOUS at 06:09

## 2019-09-14 RX ADMIN — IOHEXOL 75 ML: 350 INJECTION, SOLUTION INTRAVENOUS at 09:09

## 2019-09-14 NOTE — ED PROVIDER NOTES
"Encounter Date: 2019    SCRIBE #1 NOTE: I, Chichi Bansal, am scribing for, and in the presence of,  Dr. Durant. I have scribed the following portions of the note - Other sections scribed: HPI, ROS, PE.       History     Chief Complaint   Patient presents with    Drug Overdose     Patient reports "took her anti hypertensive medication twice today accidently and blood is low." Blood pressure at home was 60/44 with heart rate in the 40'2.     64 year old female with Hypertension, Depression and Hyperlipidemia presents to the ED complaining of drug overdose. She reports accidentally taking 2 doses of every medicine prescribed in her medical history earlier this morning. She remembers taking the first dose of medications at 9:30 a.m and the second does at noon. She told her  to take her blood pressure (60/44) and heart rate (42) at noon because she felt tired. She denies chest pain, shortness of breath, nausea or decreased appetite. She does not have any allergies to any medications.  She has had a recent surgery on her right ankle due to a fracture and today was the 1st day that she was able to ambulate and participate in physical therapy.  There was not an episode of chest pain or shortness of breath. She has had no fevers or chills. No nausea or vomiting. No burning with urination      The history is provided by the patient and the spouse. No  was used.     Review of patient's allergies indicates:  No Known Allergies  Past Medical History:   Diagnosis Date    Anxiety     Cancer     thyroid    Chronic low back pain     Depression     Heart murmur     Hyperlipidemia     Hypertension     Menorrhagia     OA (osteoarthritis) of knee     Obesity     Personal history of colonic polyps      Past Surgical History:   Procedure Laterality Date    breast reduction       SECTION, LOW TRANSVERSE      HYSTERECTOMY      ROTATOR CUFF REPAIR      TOTAL KNEE ARTHROPLASTY  "     left     Family History   Problem Relation Age of Onset    Hypertension Mother     Colon cancer Father         colon    Alzheimer's disease Father      Social History     Tobacco Use    Smoking status: Former Smoker     Types: Cigarettes     Last attempt to quit: 2012     Years since quittin.1    Smokeless tobacco: Never Used   Substance Use Topics    Alcohol use: No    Drug use: No     Review of Systems   Constitutional: Positive for fatigue. Negative for appetite change.   Respiratory: Negative for shortness of breath.    Cardiovascular: Negative for chest pain.   Gastrointestinal: Negative for nausea.   Skin: Positive for pallor.   All other systems reviewed and are negative.      Physical Exam     Initial Vitals [19 1610]   BP Pulse Resp Temp SpO2   (!) 77/38 (!) 55 18 97.6 °F (36.4 °C) 97 %      MAP       --       Temp:  [97.6 °F (36.4 °C)] 97.6 °F (36.4 °C)  Pulse:  [45-55] 48  Resp:  [11-18] 11  SpO2:  [94 %-98 %] 95 %  BP: (77-94)/(38-54) 89/53    Physical Exam    Nursing note and vitals reviewed.  Constitutional: She appears well-developed and well-nourished. No distress (mild).   HENT:   Head: Normocephalic and atraumatic.   Mucosa is dry   Eyes: Conjunctivae are normal.   Neck: Normal range of motion. Neck supple. No tracheal deviation present. No JVD present.   Cardiovascular: Regular rhythm and intact distal pulses.   Bradycardia at 45   Pulmonary/Chest: Effort normal and breath sounds normal. No stridor. No respiratory distress. She has no wheezes. She has no rales.   Abdominal: Soft. Bowel sounds are normal. She exhibits no distension. There is no tenderness.   Musculoskeletal: Normal range of motion.   Neurological: She is alert and oriented to person, place, and time. GCS score is 15. GCS eye subscore is 4. GCS verbal subscore is 5. GCS motor subscore is 6.   Skin: Skin is warm and dry. Capillary refill takes less than 2 seconds. No rash noted. No erythema.   Psychiatric:  She has a normal mood and affect. Her behavior is normal.      Splint is in place on lower leg status post surgery and fracture.    ED Course   Procedures  Labs Reviewed   POCT CMP - Abnormal; Notable for the following components:       Result Value    POC BUN 31 (*)     POC Creatinine 1.4 (*)     Protein 6.3 (*)     All other components within normal limits   POCT B-TYPE NATRIURETIC PEPTIDE (BNP) - Abnormal; Notable for the following components:    POC B-Type Natriuretic Peptide 103 (*)     All other components within normal limits   POCT D DIMER - Abnormal; Notable for the following components:    POC D- (*)     All other components within normal limits   TROPONIN ISTAT   POCT CBC   POCT URINALYSIS W/O SCOPE   POCT CMP   POCT TROPONIN   POCT D DIMER   POCT B-TYPE NATRIURETIC PEPTIDE (BNP)          Imaging Results          X-Ray Chest AP Portable (Final result)  Result time 09/14/19 17:09:48    Final result by Timo Orr MD (09/14/19 17:09:48)                 Impression:      No acute process.      Electronically signed by: Timo Orr MD  Date:    09/14/2019  Time:    17:09             Narrative:    EXAMINATION:  XR CHEST AP PORTABLE    CLINICAL HISTORY:  Weakness    TECHNIQUE:  Single frontal view of the chest was performed.    COMPARISON:  06/20/2019.    FINDINGS:  Monitoring EKG leads are present.  The trachea is unremarkable.  There is stable prominence of the cardiomediastinal silhouette.  The hemidiaphragms are unremarkable.  There is no evidence of free air beneath the hemidiaphragms.  There are no pleural effusions.  There is no evidence of a pneumothorax.  There is no evidence of pneumomediastinum.  No airspace opacity is present.  The osseous structures are unremarkable.                                            Scribe Attestation:   Scribe #1: I performed the above scribed service and the documentation accurately describes the services I performed. I attest to the accuracy of the note.    I  attest that I personally performed the services documented by the scribe and acknowledged and confirm the content of the note.   Nurses notes were reviewed.  Nicole Durant              ED Course as of Sep 14 1808   Sat Sep 14, 2019   1712 BNP and troponin are normal   POC B-Type Natriuretic Peptide(!): 103 [MH]   1713 BUN and creatinine are mildly elevated otherwise the CMP is normal    []   1713 Chest x-ray is within normal limits    []   1804 Will repeat NS and then to CTA    []      ED Course User Index  [] Nicole Durant MD     Clinical Impression:     1. Weakness      The following clinical decision rules were used in the management of this patient:  PERC  Age over 49, Surgery or trauma within the past 4 weeks, PERC: LOW suspicion AND any criteria present = check dimer to determine CTA or VQ and D-dimer elevated INR I have ordered a CTA of the chest.    Patient's has been and the patient would not like to be admitted if at all possible.  I have discussed with him the importance of hydration and observation and if the CT of the chest is normal and if the patient's vital signs stabilized it is possible that will be able to send her home otherwise she will be admitted to observation.6:10 PM                             Nicole Durant MD  09/14/19 1810

## 2019-09-14 NOTE — TELEPHONE ENCOUNTER
Reason for Disposition   Patient already left for the hospital/clinic.    Protocols used: NO CONTACT OR DUPLICATE CONTACT CALL-A-AH    Pt currently in ED.

## 2019-09-15 NOTE — ED NOTES
Pt assisted to and from the restroom in wheelchair by female tech. Pt told risks involved with CT scan with contrast and kidney functions. Pt stable, AAO x 4.

## 2019-09-15 NOTE — ED PROVIDER NOTES
"Encounter Date: 2019       History     Chief Complaint   Patient presents with    Drug Overdose     Patient reports "took her anti hypertensive medication twice today accidently and blood is low." Blood pressure at home was 60/44 with heart rate in the 40'2.     64 year old female with Hypertension, Depression and Hyperlipidemia presents to the ED complaining of drug overdose. She reports accidentally taking 2 doses of every medicine prescribed in her medical history earlier this morning. She remembers taking the first dose of medications at 9:30 a.m and the second does at noon. She told her  to take her blood pressure (60/44) and heart rate (42) at noon because she felt tired. She denies chest pain, shortness of breath, nausea or decreased appetite. She does not have any allergies to any medications.  She has had a recent surgery on her right ankle due to a fracture and today was the 1st day that she was able to ambulate and participate in physical therapy.  There was not an episode of chest pain or shortness of breath. She has had no fevers or chills. No nausea or vomiting. No burning with urination        Review of patient's allergies indicates:  No Known Allergies  Past Medical History:   Diagnosis Date    Anxiety     Cancer     thyroid    Chronic low back pain     Depression     Heart murmur     Hyperlipidemia     Hypertension     Menorrhagia     OA (osteoarthritis) of knee     Obesity     Personal history of colonic polyps      Past Surgical History:   Procedure Laterality Date    breast reduction       SECTION, LOW TRANSVERSE      HYSTERECTOMY      ROTATOR CUFF REPAIR      TOTAL KNEE ARTHROPLASTY      left     Family History   Problem Relation Age of Onset    Hypertension Mother     Colon cancer Father         colon    Alzheimer's disease Father      Social History     Tobacco Use    Smoking status: Former Smoker     Types: Cigarettes     Last attempt to quit: " 2012     Years since quittin.1    Smokeless tobacco: Never Used   Substance Use Topics    Alcohol use: No    Drug use: No     Review of Systems   Constitutional: Positive for fatigue. Negative for chills and fever.   All other systems reviewed and are negative.      Physical Exam     Initial Vitals [19 1610]   BP Pulse Resp Temp SpO2   (!) 77/38 (!) 55 18 97.6 °F (36.4 °C) 97 %      MAP       --         Physical Exam    Nursing note and vitals reviewed.  Constitutional: She appears well-developed and well-nourished. No distress.   HENT:   Head: Normocephalic and atraumatic.   Right Ear: External ear normal.   Left Ear: External ear normal.   Mouth/Throat: Oropharynx is clear and moist.   Eyes: Conjunctivae and EOM are normal. Pupils are equal, round, and reactive to light.   Neck: Normal range of motion. Neck supple.   Cardiovascular: Regular rhythm and intact distal pulses.   Bradycardia   Pulmonary/Chest: Breath sounds normal. No respiratory distress.   Abdominal: Soft. Bowel sounds are normal.   Musculoskeletal: Normal range of motion.   Neurological: She is alert. No cranial nerve deficit or sensory deficit. GCS score is 15. GCS eye subscore is 4. GCS verbal subscore is 5. GCS motor subscore is 6.   Skin: Skin is warm and dry. Capillary refill takes less than 2 seconds.   Psychiatric: She has a normal mood and affect. Her behavior is normal. Thought content normal.         ED Course   Critical Care  Date/Time: 2019 10:10 PM  Performed by: Reji Doe MD  Authorized by: Reji Doe MD   Direct patient critical care time: 15 minutes  Additional history critical care time: 5 minutes  Ordering / reviewing critical care time: 10 minutes  Documentation critical care time: 15 minutes  Consult with family critical care time: 5 minutes  Total critical care time (exclusive of procedural time) : 50 minutes  Critical care was necessary to treat or prevent imminent or life-threatening  deterioration of the following conditions: circulatory failure and respiratory failure.  Critical care was time spent personally by me on the following activities: evaluation of patient's response to treatment, development of treatment plan with patient or surrogate, examination of patient, ordering and review of laboratory studies, re-evaluation of patient's condition, ordering and review of radiographic studies and obtaining history from patient or surrogate.  Subsequent provider of critical care: I assumed direction of critical care for this patient from another provider of my specialty.        Labs Reviewed   POCT URINALYSIS W/O SCOPE - Abnormal; Notable for the following components:       Result Value    Glucose, UA Negative (*)     Bilirubin, UA Negative (*)     Ketones, UA Negative (*)     Spec Grav UA <=1.005 (*)     Blood, UA Trace-intact (*)     Protein, UA Negative (*)     Nitrite, UA Negative (*)     Leukocytes, UA Negative (*)     All other components within normal limits   POCT CMP - Abnormal; Notable for the following components:    POC BUN 31 (*)     POC Creatinine 1.4 (*)     Protein 6.3 (*)     All other components within normal limits   POCT B-TYPE NATRIURETIC PEPTIDE (BNP) - Abnormal; Notable for the following components:    POC B-Type Natriuretic Peptide 103 (*)     All other components within normal limits   POCT D DIMER - Abnormal; Notable for the following components:    POC D- (*)     All other components within normal limits   TROPONIN ISTAT   POCT CBC   POCT URINALYSIS W/O SCOPE   POCT CMP   POCT TROPONIN   POCT D DIMER   POCT B-TYPE NATRIURETIC PEPTIDE (BNP)          Imaging Results          X-Ray Chest AP Portable (Final result)  Result time 09/14/19 17:09:48    Final result by Timo Orr MD (09/14/19 17:09:48)                 Impression:      No acute process.      Electronically signed by: Timo Orr MD  Date:    09/14/2019  Time:    17:09             Narrative:     EXAMINATION:  XR CHEST AP PORTABLE    CLINICAL HISTORY:  Weakness    TECHNIQUE:  Single frontal view of the chest was performed.    COMPARISON:  06/20/2019.    FINDINGS:  Monitoring EKG leads are present.  The trachea is unremarkable.  There is stable prominence of the cardiomediastinal silhouette.  The hemidiaphragms are unremarkable.  There is no evidence of free air beneath the hemidiaphragms.  There are no pleural effusions.  There is no evidence of a pneumothorax.  There is no evidence of pneumomediastinum.  No airspace opacity is present.  The osseous structures are unremarkable.                                 Medical Decision Making:   Clinical Tests:   Lab Tests: Ordered and Reviewed  Radiological Study: Ordered and Reviewed  ED Management:  Cardiac workup was negative except for elevated D-dimer. Patient's blood pressure increased after normal saline boluses.  CT of chest using PE protocol was ordered and was negative for pulmonary embolus.  Patient was hydrated with normal saline boluses prior to CT of the chest secondary to decreased GFR.  Instructions were given for accidental overdose.  She was advised to follow up with her primary care physician within the next 3 days for re-evaluation and to return to the emergency department condition worsens.                   ED Course as of Sep 14 2119   Sat Sep 14, 2019   1712 BNP and troponin are normal   POC B-Type Natriuretic Peptide(!): 103 [MH]   1713 BUN and creatinine are mildly elevated otherwise the CMP is normal    []   1713 Chest x-ray is within normal limits    [MH]   1804 Will repeat NS and then to CTA    []      ED Course User Index  [MH] Nicole Durant MD     Clinical Impression:       ICD-10-CM ICD-9-CM   1. Accidental drug overdose, initial encounter T50.901A 977.9     E858.9   2. Weakness R53.1 780.79         Disposition:   Disposition: Discharged  Condition: Stable                        Reji Doe MD  09/14/19 [id number]        Reji Doe MD  09/14/19 2171

## 2019-09-15 NOTE — ED NOTES
Dr. Doe said the pt is ok to go to CT without the recollect blood draw. Pt received two liters NS bolus. CT tech notified and said he will call the radiologist and get recommendation because of protocol. Pt stable at this time,  at bedside, VS stable, SB on cardiac monitor, pt wearing medical boot/brace on right foot from previous surgery.

## 2019-09-17 ENCOUNTER — OFFICE VISIT (OUTPATIENT)
Dept: FAMILY MEDICINE | Facility: CLINIC | Age: 64
End: 2019-09-17
Payer: COMMERCIAL

## 2019-09-17 VITALS
BODY MASS INDEX: 34.96 KG/M2 | DIASTOLIC BLOOD PRESSURE: 109 MMHG | SYSTOLIC BLOOD PRESSURE: 183 MMHG | HEART RATE: 87 BPM | HEIGHT: 60 IN | TEMPERATURE: 98 F | OXYGEN SATURATION: 97 %

## 2019-09-17 DIAGNOSIS — E78.5 DYSLIPIDEMIA: Chronic | ICD-10-CM

## 2019-09-17 DIAGNOSIS — I10 ESSENTIAL HYPERTENSION: Primary | ICD-10-CM

## 2019-09-17 DIAGNOSIS — E03.9 ACQUIRED HYPOTHYROIDISM: Chronic | ICD-10-CM

## 2019-09-17 PROCEDURE — 99999 PR PBB SHADOW E&M-EST. PATIENT-LVL III: ICD-10-PCS | Mod: PBBFAC,,, | Performed by: INTERNAL MEDICINE

## 2019-09-17 PROCEDURE — 99214 PR OFFICE/OUTPT VISIT, EST, LEVL IV, 30-39 MIN: ICD-10-PCS | Mod: S$GLB,,, | Performed by: INTERNAL MEDICINE

## 2019-09-17 PROCEDURE — 99214 OFFICE O/P EST MOD 30 MIN: CPT | Mod: S$GLB,,, | Performed by: INTERNAL MEDICINE

## 2019-09-17 PROCEDURE — 99999 PR PBB SHADOW E&M-EST. PATIENT-LVL III: CPT | Mod: PBBFAC,,, | Performed by: INTERNAL MEDICINE

## 2019-09-17 RX ORDER — CHLORTHALIDONE 25 MG/1
25 TABLET ORAL DAILY
Qty: 30 TABLET | Refills: 2 | Status: SHIPPED | OUTPATIENT
Start: 2019-09-17 | End: 2019-12-06 | Stop reason: SDUPTHER

## 2019-09-17 NOTE — PROGRESS NOTES
Subjective:     Chief Complaint  No chief complaint on file.      HPI  Jorge Joshua is a 64 y.o. female with medical diagnoses as listed in the medical history and problem list that presents for follow-up visit.  Last clinic visit was 2019.      On Saturday night took two BP meds (Maxide) and BP dropped significantly  Yesterday with SBP of 200 at home - has been elevated to a great extent with home assessments  Right lower extremity pain is mild - following with Ortho  Feels fatigued    Patient Care Team:  Santos Cortez MD as PCP - General (Internal Medicine)  Beth Acharya MD as Consulting Physician (Hematology and Oncology)  Mj Rose MD as Consulting Physician (Endocrinology)  Flynn Hendricks Jr., MD as Consulting Physician (Rheumatology)  Rafal Morrell MD as Consulting Physician (Cardiology)  Vicki Padilla LPN as Licensed Practical Nurse      PAST MEDICAL HISTORY:  Past Medical History:   Diagnosis Date    Anxiety     Cancer     thyroid    Chronic low back pain     Depression     Heart murmur     Hyperlipidemia     Hypertension     Menorrhagia     OA (osteoarthritis) of knee     Obesity     Personal history of colonic polyps        PAST SURGICAL HISTORY:  Past Surgical History:   Procedure Laterality Date    breast reduction       SECTION, LOW TRANSVERSE      HYSTERECTOMY      ROTATOR CUFF REPAIR      TOTAL KNEE ARTHROPLASTY      left       SOCIAL HISTORY:  Social History     Socioeconomic History    Marital status:      Spouse name: kelly    Number of children: 2    Years of education: 12    Highest education level: Not on file   Occupational History    Occupation:      Employer: [employer]   Social Needs    Financial resource strain: Not on file    Food insecurity:     Worry: Not on file     Inability: Not on file    Transportation needs:     Medical: Not on file     Non-medical: Not on file   Tobacco Use     Smoking status: Former Smoker     Types: Cigarettes     Last attempt to quit: 2012     Years since quittin.1    Smokeless tobacco: Never Used   Substance and Sexual Activity    Alcohol use: No    Drug use: No    Sexual activity: Yes     Partners: Male   Lifestyle    Physical activity:     Days per week: Not on file     Minutes per session: Not on file    Stress: Not on file   Relationships    Social connections:     Talks on phone: Not on file     Gets together: Not on file     Attends Sabianism service: Not on file     Active member of club or organization: Not on file     Attends meetings of clubs or organizations: Not on file     Relationship status: Not on file   Other Topics Concern    Not on file   Social History Narrative    Not on file       FAMILY HISTORY:  Family History   Problem Relation Age of Onset    Hypertension Mother     Colon cancer Father         colon    Alzheimer's disease Father        ALLERGIES AND MEDICATIONS: updated and reviewed.  Review of patient's allergies indicates:  No Known Allergies  Current Outpatient Medications   Medication Sig Dispense Refill    aspirin (ECOTRIN) 325 MG EC tablet Take 325 mg by mouth.      azaTHIOprine (IMURAN) 50 mg Tab Take 50 mg by mouth once daily.      clonazePAM (KLONOPIN) 2 MG Tab Take 1 tablet (2 mg) by mouth every morning and 2 tabs (4 mg) every evening      dextroamphetamine-amphetamine 30 mg Tab Take 30 mg by mouth once daily. On workdays      ergocalciferol (ERGOCALCIFEROL) 50,000 unit Cap Take 50,000 Units by mouth every 7 days.       levothyroxine (SYNTHROID) 100 MCG tablet Take 100 mcg by mouth before breakfast.       losartan (COZAAR) 100 MG tablet Take 1 tablet (100 mg total) by mouth once daily. 90 tablet 3    paroxetine (PAXIL-CR) 25 MG 24 hr tablet Take 25 mg by mouth 2 (two) times daily.       RHEUMATE 1-1-500 mg Cap Take 1 capsule by mouth once daily.  12    triamterene-hydrochlorothiazide 37.5-25 mg  (MAXZIDE-25) 37.5-25 mg per tablet Take 1 tablet by mouth once daily. Use as directed 0.5-1 daily or every other day       No current facility-administered medications for this visit.          ROS:  Review of Systems   Constitutional: Positive for fatigue. Negative for appetite change, chills, fever and unexpected weight change.   Respiratory: Negative for cough and shortness of breath.    Cardiovascular: Negative for chest pain, palpitations and leg swelling.   Musculoskeletal: Positive for arthralgias.   Skin: Negative.    Neurological: Negative for dizziness, light-headedness and headaches.       Objective:       Physical Exam  Vitals:    09/17/19 1340 09/17/19 1343   BP: (!) 150/84 (!) 183/109   BP Location: Right arm Left arm   Patient Position: Sitting Sitting   BP Method: Medium (Manual) Large (Manual)   Pulse: 87    Temp: 97.8 °F (36.6 °C)    TempSrc: Oral    SpO2: 97%    Height: 5' (1.524 m)     Body mass index is 34.96 kg/m².      Height: 5' (152.4 cm)   Physical Exam   Constitutional: She appears well-developed. No distress.   HENT:   Head: Normocephalic and atraumatic.   Eyes: EOM are normal.   Cardiovascular: Normal rate, normal heart sounds and intact distal pulses. Exam reveals no gallop and no friction rub.   No murmur heard.  Pulmonary/Chest: Effort normal.   Musculoskeletal: She exhibits no edema.   Neurological: She is alert. No cranial nerve deficit.   Skin: Skin is warm and dry.   Psychiatric: She has a normal mood and affect. Her behavior is normal.   Vitals reviewed.          Assessment:     1. Essential hypertension    2. Dyslipidemia    3. Acquired hypothyroidism s/p thyroidectomy in 2017      Plan:     Diagnoses and all orders for this visit:    Essential hypertension  Blood pressure is not controlled today. We discussed low salt diet and regular exercise.  Medication changes made today: discontinue triamterene-HCTZ, start chlorthalidone.  Patient also asked to continue to check blood  pressure at home and bring recordings to next office visit. We did not discuss enrolling in the digital hypertension program at this time.   She will return in 1 week for BP recheck (Nurse visit) and in 2 weeks' time approximately for re-evaluation of her symptoms to see if they have resolved  -     chlorthalidone (HYGROTEN) 25 MG Tab; Take 1 tablet (25 mg total) by mouth once daily.    Dyslipidemia  Lipids controlled presently    Acquired hypothyroidism s/p thyroidectomy in 2017  Patient on thyroid replacement therapy with recent thyroid indices in June 2019 abnormal -  Will have patient repeat thyroid labs at time of nurse visit next week      Follow up if symptoms worsen or fail to improve.    The patient expressed understanding and no barriers to adherence were identified.     1. The patient indicates understanding of these issues and agrees with the plan. Brief care plan is updated and reviewed with the patient as applicable.     2. The patient is given an After Visit Summary that lists all medications with directions, allergies, orders placed during this encounter and follow-up instructions.     3. I have reviewed the patient's medical information including past medical, family, and social history sections including the medications and allergies.     4. We discussed the patient's current medications. I reconciled the patient's medication list and prepared and supplied needed refills.       Santos Cortez MD  Internal Medicine-Pediatrics

## 2019-09-17 NOTE — Clinical Note
Ambrose Brown I meant to tell Mrs Joshua to get bloodwork on the 24th when she comes in for the nurse BP visit as well (to check electrolytes on the new medication and her thyroid function). Could you schedule her? Thanks!MILES

## 2019-09-18 PROBLEM — R06.09 DYSPNEA ON EXERTION: Status: RESOLVED | Noted: 2019-02-17 | Resolved: 2019-09-18

## 2019-09-24 ENCOUNTER — CLINICAL SUPPORT (OUTPATIENT)
Dept: FAMILY MEDICINE | Facility: CLINIC | Age: 64
End: 2019-09-24
Payer: COMMERCIAL

## 2019-09-24 VITALS — DIASTOLIC BLOOD PRESSURE: 82 MMHG | OXYGEN SATURATION: 97 % | HEART RATE: 72 BPM | SYSTOLIC BLOOD PRESSURE: 132 MMHG

## 2019-09-24 DIAGNOSIS — I10 ESSENTIAL HYPERTENSION: Primary | ICD-10-CM

## 2019-09-24 PROCEDURE — 99999 PR PBB SHADOW E&M-EST. PATIENT-LVL II: ICD-10-PCS | Mod: PBBFAC,,,

## 2019-09-24 PROCEDURE — 99499 NO LOS: ICD-10-PCS | Mod: S$GLB,,, | Performed by: INTERNAL MEDICINE

## 2019-09-24 PROCEDURE — 99999 PR PBB SHADOW E&M-EST. PATIENT-LVL II: CPT | Mod: PBBFAC,,,

## 2019-09-24 PROCEDURE — 99499 UNLISTED E&M SERVICE: CPT | Mod: S$GLB,,, | Performed by: INTERNAL MEDICINE

## 2019-09-24 NOTE — PROGRESS NOTES
Jorge Joshua 64 y.o. female is here today for Blood Pressure check.   History of HTN yes.    Review of patient's allergies indicates:  No Known Allergies  Creatinine   Date Value Ref Range Status   06/21/2019 0.9 0.5 - 1.4 mg/dL Final     Sodium   Date Value Ref Range Status   06/21/2019 144 136 - 145 mmol/L Final     Potassium   Date Value Ref Range Status   06/21/2019 3.5 3.5 - 5.1 mmol/L Final   ]  Patient verifies taking blood pressure medications on a regular basis at the same time of the day.     Current Outpatient Medications:     aspirin (ECOTRIN) 325 MG EC tablet, Take 325 mg by mouth., Disp: , Rfl:     azaTHIOprine (IMURAN) 50 mg Tab, Take 50 mg by mouth once daily., Disp: , Rfl:     chlorthalidone (HYGROTEN) 25 MG Tab, Take 1 tablet (25 mg total) by mouth once daily., Disp: 30 tablet, Rfl: 2    clonazePAM (KLONOPIN) 2 MG Tab, Take 1 tablet (2 mg) by mouth every morning and 2 tabs (4 mg) every evening, Disp: , Rfl:     dextroamphetamine-amphetamine 30 mg Tab, Take 30 mg by mouth once daily. On workdays, Disp: , Rfl:     ergocalciferol (ERGOCALCIFEROL) 50,000 unit Cap, Take 50,000 Units by mouth every 7 days. , Disp: , Rfl:     levothyroxine (SYNTHROID) 100 MCG tablet, Take 100 mcg by mouth before breakfast. , Disp: , Rfl:     losartan (COZAAR) 100 MG tablet, Take 1 tablet (100 mg total) by mouth once daily., Disp: 90 tablet, Rfl: 3    paroxetine (PAXIL-CR) 25 MG 24 hr tablet, Take 25 mg by mouth 2 (two) times daily. , Disp: , Rfl:     RHEUMATE 1-1-500 mg Cap, Take 1 capsule by mouth once daily., Disp: , Rfl: 12  No current facility-administered medications for this visit.   Does patient have record of home blood pressure readings no.  Last dose of blood pressure medication was taken at 7:30 this am.  Patient is asymptomatic.   Complains of none.    Vitals:    09/24/19 1407   BP: 132/82   BP Location: Left arm   Patient Position: Sitting   BP Method: Large (Manual)   Pulse: 72   SpO2: 97%          Dr. Cortez informed of nurse visit.

## 2019-10-14 LAB — TSH: 4.11 (ref 0.45–4.6)

## 2019-10-18 ENCOUNTER — TELEPHONE (OUTPATIENT)
Dept: FAMILY MEDICINE | Facility: CLINIC | Age: 64
End: 2019-10-18

## 2019-10-18 NOTE — TELEPHONE ENCOUNTER
----- Message from Jen Michaels sent at 10/18/2019 12:34 PM CDT -----  Contact: self  Type:  Patient states calling to inform nurse went to [facility] and had labwork done    Name of Caller:Patient   When is the first available appointment?  Symptoms:  Best Call Back Number:[phone]  Additional Information:

## 2019-10-22 ENCOUNTER — PATIENT MESSAGE (OUTPATIENT)
Dept: FAMILY MEDICINE | Facility: CLINIC | Age: 64
End: 2019-10-22

## 2019-11-11 ENCOUNTER — OFFICE VISIT (OUTPATIENT)
Dept: FAMILY MEDICINE | Facility: CLINIC | Age: 64
End: 2019-11-11
Payer: COMMERCIAL

## 2019-11-11 VITALS
OXYGEN SATURATION: 95 % | DIASTOLIC BLOOD PRESSURE: 84 MMHG | SYSTOLIC BLOOD PRESSURE: 118 MMHG | HEIGHT: 60 IN | TEMPERATURE: 98 F | HEART RATE: 102 BPM | BODY MASS INDEX: 36.92 KG/M2 | WEIGHT: 188.06 LBS

## 2019-11-11 DIAGNOSIS — Z23 NEED FOR TDAP VACCINATION: ICD-10-CM

## 2019-11-11 DIAGNOSIS — H35.00 RETINOPATHY: ICD-10-CM

## 2019-11-11 DIAGNOSIS — M35.00 SJOGREN'S SYNDROME, WITH UNSPECIFIED ORGAN INVOLVEMENT: ICD-10-CM

## 2019-11-11 DIAGNOSIS — Z23 NEED FOR PNEUMOCOCCAL VACCINATION: ICD-10-CM

## 2019-11-11 DIAGNOSIS — I10 ESSENTIAL HYPERTENSION: Primary | ICD-10-CM

## 2019-11-11 DIAGNOSIS — Z23 NEED FOR INFLUENZA VACCINATION: ICD-10-CM

## 2019-11-11 DIAGNOSIS — E03.9 ACQUIRED HYPOTHYROIDISM: ICD-10-CM

## 2019-11-11 PROCEDURE — 90472 PNEUMOCOCCAL POLYSACCHARIDE VACCINE 23-VALENT =>2YO SQ IM: ICD-10-PCS | Mod: S$GLB,,, | Performed by: INTERNAL MEDICINE

## 2019-11-11 PROCEDURE — 90471 IMMUNIZATION ADMIN: CPT | Mod: S$GLB,,, | Performed by: INTERNAL MEDICINE

## 2019-11-11 PROCEDURE — 90686 IIV4 VACC NO PRSV 0.5 ML IM: CPT | Mod: S$GLB,,, | Performed by: INTERNAL MEDICINE

## 2019-11-11 PROCEDURE — 90472 IMMUNIZATION ADMIN EACH ADD: CPT | Mod: S$GLB,,, | Performed by: INTERNAL MEDICINE

## 2019-11-11 PROCEDURE — 99999 PR PBB SHADOW E&M-EST. PATIENT-LVL IV: CPT | Mod: PBBFAC,,, | Performed by: INTERNAL MEDICINE

## 2019-11-11 PROCEDURE — 90732 PNEUMOCOCCAL POLYSACCHARIDE VACCINE 23-VALENT =>2YO SQ IM: ICD-10-PCS | Mod: S$GLB,,, | Performed by: INTERNAL MEDICINE

## 2019-11-11 PROCEDURE — 90715 TDAP VACCINE 7 YRS/> IM: CPT | Mod: S$GLB,,, | Performed by: INTERNAL MEDICINE

## 2019-11-11 PROCEDURE — 90732 PPSV23 VACC 2 YRS+ SUBQ/IM: CPT | Mod: S$GLB,,, | Performed by: INTERNAL MEDICINE

## 2019-11-11 PROCEDURE — 99214 OFFICE O/P EST MOD 30 MIN: CPT | Mod: 25,S$GLB,, | Performed by: INTERNAL MEDICINE

## 2019-11-11 PROCEDURE — 90686 FLU VACCINE (QUAD) GREATER THAN OR EQUAL TO 3YO PRESERVATIVE FREE IM: ICD-10-PCS | Mod: S$GLB,,, | Performed by: INTERNAL MEDICINE

## 2019-11-11 PROCEDURE — 90471 FLU VACCINE (QUAD) GREATER THAN OR EQUAL TO 3YO PRESERVATIVE FREE IM: ICD-10-PCS | Mod: S$GLB,,, | Performed by: INTERNAL MEDICINE

## 2019-11-11 PROCEDURE — 99214 PR OFFICE/OUTPT VISIT, EST, LEVL IV, 30-39 MIN: ICD-10-PCS | Mod: 25,S$GLB,, | Performed by: INTERNAL MEDICINE

## 2019-11-11 PROCEDURE — 99999 PR PBB SHADOW E&M-EST. PATIENT-LVL IV: ICD-10-PCS | Mod: PBBFAC,,, | Performed by: INTERNAL MEDICINE

## 2019-11-11 PROCEDURE — 90715 TDAP VACCINE GREATER THAN OR EQUAL TO 7YO IM: ICD-10-PCS | Mod: S$GLB,,, | Performed by: INTERNAL MEDICINE

## 2019-11-11 RX ORDER — LEVOTHYROXINE SODIUM 44 UG/1
CAPSULE ORAL
Refills: 3 | COMMUNITY
Start: 2019-10-21 | End: 2020-10-16

## 2019-11-11 NOTE — PATIENT INSTRUCTIONS
I will contact your regarding cardiac monitoring results from [facility] (otherwise will have you call)

## 2019-11-11 NOTE — PROGRESS NOTES
Patient tolerated Flu, Pneumonia and T dap vaccine.  well, also asked patient to sit in lobby for 15 minutes for observation and if she has any questions please let us know. Patient verbalized understanding

## 2019-11-11 NOTE — PROGRESS NOTES
Subjective:     Chief Complaint  Chief Complaint   Patient presents with    discuss results       HPI  Jorge Joshua is a 64 y.o. female with medical diagnoses as listed in the medical history and problem list that presents for follow-up visit.  Last clinic visit was 2019.      HTN - well controlled on chlorthalidone and losartan therapy - checking BP intermittently at home with adequate control  Inquiring about results from cardiac monitoring/telemetry during inpatient stay at [facility] in the summer in the setting of lower extremity fracture and surgical intervention per Ortho    Dr Hendricks recently prescribed her a medication (?naloxone) she believes which was to assist in treating her chronic pain syndrome    Patient Care Team:  Santos Cortez MD as PCP - General (Internal Medicine)  Beth Acharya MD as Consulting Physician (Hematology and Oncology)  Mj Rose MD as Consulting Physician (Endocrinology)  Flynn Hendricks Jr., MD as Consulting Physician (Rheumatology)  Rafal Morrell MD as Consulting Physician (Cardiology)  Vicki Padilla LPN as Licensed Practical Nurse      PAST MEDICAL HISTORY:  Past Medical History:   Diagnosis Date    Anxiety     Cancer     thyroid    Chronic low back pain     Depression     Dyspnea on exertion 2019    Heart murmur     Hyperlipidemia     Hypertension     Menorrhagia     OA (osteoarthritis) of knee     Obesity     Personal history of colonic polyps        PAST SURGICAL HISTORY:  Past Surgical History:   Procedure Laterality Date    breast reduction  1998     SECTION, LOW TRANSVERSE      HYSTERECTOMY      ROTATOR CUFF REPAIR      TOTAL KNEE ARTHROPLASTY      left       SOCIAL HISTORY:  Social History     Socioeconomic History    Marital status:      Spouse name: kelly    Number of children: 2    Years of education: 12    Highest education level: Not on file   Occupational History    Occupation:       Employer: [employer]   Social Needs    Financial resource strain: Not on file    Food insecurity:     Worry: Not on file     Inability: Not on file    Transportation needs:     Medical: Not on file     Non-medical: Not on file   Tobacco Use    Smoking status: Former Smoker     Types: Cigarettes     Last attempt to quit: 2012     Years since quittin.2    Smokeless tobacco: Never Used   Substance and Sexual Activity    Alcohol use: No    Drug use: No    Sexual activity: Yes     Partners: Male   Lifestyle    Physical activity:     Days per week: Not on file     Minutes per session: Not on file    Stress: Not on file   Relationships    Social connections:     Talks on phone: Not on file     Gets together: Not on file     Attends Islam service: Not on file     Active member of club or organization: Not on file     Attends meetings of clubs or organizations: Not on file     Relationship status: Not on file   Other Topics Concern    Not on file   Social History Narrative    Not on file       FAMILY HISTORY:  Family History   Problem Relation Age of Onset    Hypertension Mother     Colon cancer Father         colon    Alzheimer's disease Father        ALLERGIES AND MEDICATIONS: updated and reviewed.  Review of patient's allergies indicates:  No Known Allergies  Current Outpatient Medications   Medication Sig Dispense Refill    chlorthalidone (HYGROTEN) 25 MG Tab Take 1 tablet (25 mg total) by mouth once daily. 30 tablet 2    clonazePAM (KLONOPIN) 2 MG Tab Take 1 tablet (2 mg) by mouth every morning and 2 tabs (4 mg) every evening      dextroamphetamine-amphetamine 30 mg Tab Take 30 mg by mouth once daily. On workdays      ergocalciferol (ERGOCALCIFEROL) 50,000 unit Cap Take 50,000 Units by mouth every 7 days.       levothyroxine (SYNTHROID) 100 MCG tablet Take 100 mcg by mouth before breakfast.       paroxetine (PAXIL-CR) 25 MG 24 hr tablet Take 25 mg by mouth 2 (two)  times daily.       RHEUMATE 1-1-500 mg Cap Take 1 capsule by mouth once daily.  12    TIROSINT- mcg/mL Soln SQUEEZE CONTENTS OF ONE AMPULE INTO GLASS OF WATER, STIR AND DRINK ONCE DAILY  3    azaTHIOprine (IMURAN) 50 mg Tab Take 50 mg by mouth once daily.      losartan (COZAAR) 100 MG tablet Take 1 tablet (100 mg total) by mouth once daily. (Patient not taking: Reported on 11/11/2019) 90 tablet 3     No current facility-administered medications for this visit.          ROS:  Review of Systems   Constitutional: Positive for fatigue. Negative for appetite change, chills, fever and unexpected weight change.   Respiratory: Negative for cough and shortness of breath.    Cardiovascular: Negative for chest pain, palpitations and leg swelling.   Musculoskeletal: Positive for arthralgias.   Skin: Negative.    Neurological: Negative for dizziness, light-headedness and headaches.       Objective:       Physical Exam  Vitals:    11/11/19 1313 11/11/19 1345   BP: (!) 140/82 118/84   BP Location: Right arm Left arm   Patient Position: Sitting Sitting   BP Method: Medium (Manual) Large (Manual)   Pulse: 102    Temp: 98.4 °F (36.9 °C)    TempSrc: Oral    SpO2: 95%    Weight: 85.3 kg (188 lb 0.8 oz)    Height: 5' (1.524 m)     Body mass index is 36.73 kg/m².  Weight: 85.3 kg (188 lb 0.8 oz)   Height: 5' (152.4 cm)   Physical Exam   Constitutional: She appears well-developed. No distress.   HENT:   Head: Normocephalic and atraumatic.   Eyes: EOM are normal.   Cardiovascular: Normal rate, normal heart sounds and intact distal pulses. Exam reveals no gallop and no friction rub.   No murmur heard.  Pulmonary/Chest: Effort normal.   Musculoskeletal: She exhibits no edema.   Neurological: She is alert. No cranial nerve deficit.   Skin: Skin is warm and dry.   Psychiatric: She has a normal mood and affect. Her behavior is normal.   Vitals reviewed.          Assessment:     1. Essential hypertension    2. Sjogren's syndrome, with  unspecified organ involvement    3. Retinopathy    4. Need for pneumococcal vaccination    5. Need for Tdap vaccination    6. Need for influenza vaccination    7. Acquired hypothyroidism s/p thyroidectomy in 2017      Plan:     Diagnoses and all orders for this visit:    Essential hypertension  Blood pressure is well controlled - The current medical regimen is effective;  continue present plan and medications.  History of palpitations/atypical chest pain - was ordered for Holter previously however had surgery recently whereby cardiac monitoring was obtained. In review of [facility] hospitalization records (through [facility] Every[facility]) I do not any report of abnormal events while on telemetry that need to be pursued presently. Reassess as clinical symptoms require    Dyslipidemia  Lipids controlled presently - repeat June 2020    Acquired hypothyroidism s/p thyroidectomy in 2017  Patient on thyroid replacement therapy with recent thyroid indices in October 2019 reportedly normal (was repeated due to abnormal labs in July 2019) will contact [facility]/Dr Troncoso's office regarding most recent results    Counseled regarding Tdap vaccination - administered  Counseled regarding seasonal influenza vaccination - administered  Risks and benefits of pneumococcal vaccination discussed - administered.      Follow up in about 3 months (around 2/11/2020) for HTN.    The patient expressed understanding and no barriers to adherence were identified.     1. The patient indicates understanding of these issues and agrees with the plan. Brief care plan is updated and reviewed with the patient as applicable.     2. The patient is given an After Visit Summary that lists all medications with directions, allergies, orders placed during this encounter and follow-up instructions.     3. I have reviewed the patient's medical information including past medical, family, and social history sections including the medications and allergies.     4. We discussed  the patient's current medications. I reconciled the patient's medication list and prepared and supplied needed refills.       Santos Cortez MD  Internal Medicine-Pediatrics

## 2019-11-13 ENCOUNTER — PATIENT MESSAGE (OUTPATIENT)
Dept: FAMILY MEDICINE | Facility: CLINIC | Age: 64
End: 2019-11-13

## 2019-11-22 ENCOUNTER — PATIENT MESSAGE (OUTPATIENT)
Dept: FAMILY MEDICINE | Facility: CLINIC | Age: 64
End: 2019-11-22

## 2019-11-26 ENCOUNTER — PATIENT MESSAGE (OUTPATIENT)
Dept: FAMILY MEDICINE | Facility: CLINIC | Age: 64
End: 2019-11-26

## 2019-11-26 DIAGNOSIS — D64.9 NORMOCYTIC ANEMIA: ICD-10-CM

## 2019-11-26 DIAGNOSIS — R73.03 PREDIABETES: ICD-10-CM

## 2019-11-26 DIAGNOSIS — E03.9 ACQUIRED HYPOTHYROIDISM: Primary | ICD-10-CM

## 2019-11-27 ENCOUNTER — TELEPHONE (OUTPATIENT)
Dept: FAMILY MEDICINE | Facility: CLINIC | Age: 64
End: 2019-11-27

## 2019-11-27 NOTE — TELEPHONE ENCOUNTER
Spoke with patient and she stated that she will have her   her labs from Dr. Rose and fax them over to us.

## 2019-12-06 DIAGNOSIS — I10 ESSENTIAL HYPERTENSION: ICD-10-CM

## 2019-12-06 RX ORDER — CHLORTHALIDONE 25 MG/1
TABLET ORAL
Qty: 90 TABLET | Refills: 1 | Status: SHIPPED | OUTPATIENT
Start: 2019-12-06 | End: 2020-04-21 | Stop reason: SDUPTHER

## 2019-12-27 ENCOUNTER — PATIENT MESSAGE (OUTPATIENT)
Dept: FAMILY MEDICINE | Facility: CLINIC | Age: 64
End: 2019-12-27

## 2020-01-15 ENCOUNTER — TELEPHONE (OUTPATIENT)
Dept: HOME HEALTH SERVICES | Facility: HOSPITAL | Age: 65
End: 2020-01-15

## 2020-01-17 ENCOUNTER — PATIENT OUTREACH (OUTPATIENT)
Dept: ADMINISTRATIVE | Facility: HOSPITAL | Age: 65
End: 2020-01-17

## 2020-01-20 ENCOUNTER — OFFICE VISIT (OUTPATIENT)
Dept: FAMILY MEDICINE | Facility: CLINIC | Age: 65
End: 2020-01-20
Payer: COMMERCIAL

## 2020-01-20 ENCOUNTER — LAB VISIT (OUTPATIENT)
Dept: LAB | Facility: HOSPITAL | Age: 65
End: 2020-01-20
Attending: INTERNAL MEDICINE
Payer: COMMERCIAL

## 2020-01-20 VITALS
HEIGHT: 60 IN | DIASTOLIC BLOOD PRESSURE: 72 MMHG | WEIGHT: 179.56 LBS | HEART RATE: 76 BPM | TEMPERATURE: 98 F | OXYGEN SATURATION: 95 % | BODY MASS INDEX: 35.25 KG/M2 | SYSTOLIC BLOOD PRESSURE: 114 MMHG

## 2020-01-20 DIAGNOSIS — M35.00 SJOGREN'S SYNDROME, WITH UNSPECIFIED ORGAN INVOLVEMENT: ICD-10-CM

## 2020-01-20 DIAGNOSIS — Z09 HOSPITAL DISCHARGE FOLLOW-UP: Primary | ICD-10-CM

## 2020-01-20 DIAGNOSIS — I10 ESSENTIAL HYPERTENSION: ICD-10-CM

## 2020-01-20 DIAGNOSIS — R79.82 ELEVATED C-REACTIVE PROTEIN (CRP): ICD-10-CM

## 2020-01-20 DIAGNOSIS — Z86.19 HISTORY OF WOUND INFECTION: ICD-10-CM

## 2020-01-20 LAB
ALBUMIN SERPL BCP-MCNC: 3.8 G/DL (ref 3.5–5.2)
ALP SERPL-CCNC: 85 U/L (ref 55–135)
ALT SERPL W/O P-5'-P-CCNC: 15 U/L (ref 10–44)
ANION GAP SERPL CALC-SCNC: 10 MMOL/L (ref 8–16)
AST SERPL-CCNC: 19 U/L (ref 10–40)
BASOPHILS # BLD AUTO: 0.05 K/UL (ref 0–0.2)
BASOPHILS NFR BLD: 0.7 % (ref 0–1.9)
BILIRUB SERPL-MCNC: 0.6 MG/DL (ref 0.1–1)
BUN SERPL-MCNC: 22 MG/DL (ref 8–23)
CALCIUM SERPL-MCNC: 9.8 MG/DL (ref 8.7–10.5)
CHLORIDE SERPL-SCNC: 101 MMOL/L (ref 95–110)
CO2 SERPL-SCNC: 35 MMOL/L (ref 23–29)
CREAT SERPL-MCNC: 0.9 MG/DL (ref 0.5–1.4)
CRP SERPL-MCNC: 4.3 MG/L (ref 0–8.2)
DIFFERENTIAL METHOD: ABNORMAL
EOSINOPHIL # BLD AUTO: 0.1 K/UL (ref 0–0.5)
EOSINOPHIL NFR BLD: 1 % (ref 0–8)
ERYTHROCYTE [DISTWIDTH] IN BLOOD BY AUTOMATED COUNT: 13.5 % (ref 11.5–14.5)
ERYTHROCYTE [SEDIMENTATION RATE] IN BLOOD BY WESTERGREN METHOD: 15 MM/HR (ref 0–36)
EST. GFR  (AFRICAN AMERICAN): >60 ML/MIN/1.73 M^2
EST. GFR  (NON AFRICAN AMERICAN): >60 ML/MIN/1.73 M^2
GLUCOSE SERPL-MCNC: 96 MG/DL (ref 70–110)
HCT VFR BLD AUTO: 40.7 % (ref 37–48.5)
HGB BLD-MCNC: 12.6 G/DL (ref 12–16)
IMM GRANULOCYTES # BLD AUTO: 0.02 K/UL (ref 0–0.04)
IMM GRANULOCYTES NFR BLD AUTO: 0.3 % (ref 0–0.5)
LYMPHOCYTES # BLD AUTO: 1.7 K/UL (ref 1–4.8)
LYMPHOCYTES NFR BLD: 23.4 % (ref 18–48)
MCH RBC QN AUTO: 28.4 PG (ref 27–31)
MCHC RBC AUTO-ENTMCNC: 31 G/DL (ref 32–36)
MCV RBC AUTO: 92 FL (ref 82–98)
MONOCYTES # BLD AUTO: 0.6 K/UL (ref 0.3–1)
MONOCYTES NFR BLD: 9 % (ref 4–15)
NEUTROPHILS # BLD AUTO: 4.7 K/UL (ref 1.8–7.7)
NEUTROPHILS NFR BLD: 65.6 % (ref 38–73)
NRBC BLD-RTO: 0 /100 WBC
PLATELET # BLD AUTO: 363 K/UL (ref 150–350)
PMV BLD AUTO: 10 FL (ref 9.2–12.9)
POTASSIUM SERPL-SCNC: 4 MMOL/L (ref 3.5–5.1)
PROT SERPL-MCNC: 7.2 G/DL (ref 6–8.4)
RBC # BLD AUTO: 4.44 M/UL (ref 4–5.4)
SODIUM SERPL-SCNC: 146 MMOL/L (ref 136–145)
WBC # BLD AUTO: 7.15 K/UL (ref 3.9–12.7)

## 2020-01-20 PROCEDURE — 99999 PR PBB SHADOW E&M-EST. PATIENT-LVL III: CPT | Mod: PBBFAC,,, | Performed by: INTERNAL MEDICINE

## 2020-01-20 PROCEDURE — 80053 COMPREHEN METABOLIC PANEL: CPT

## 2020-01-20 PROCEDURE — 36415 COLL VENOUS BLD VENIPUNCTURE: CPT | Mod: PO

## 2020-01-20 PROCEDURE — 99214 PR OFFICE/OUTPT VISIT, EST, LEVL IV, 30-39 MIN: ICD-10-PCS | Mod: S$GLB,,, | Performed by: INTERNAL MEDICINE

## 2020-01-20 PROCEDURE — 85652 RBC SED RATE AUTOMATED: CPT

## 2020-01-20 PROCEDURE — 99999 PR PBB SHADOW E&M-EST. PATIENT-LVL III: ICD-10-PCS | Mod: PBBFAC,,, | Performed by: INTERNAL MEDICINE

## 2020-01-20 PROCEDURE — 86140 C-REACTIVE PROTEIN: CPT

## 2020-01-20 PROCEDURE — 85025 COMPLETE CBC W/AUTO DIFF WBC: CPT

## 2020-01-20 PROCEDURE — 99214 OFFICE O/P EST MOD 30 MIN: CPT | Mod: S$GLB,,, | Performed by: INTERNAL MEDICINE

## 2020-01-20 RX ORDER — CLINDAMYCIN HYDROCHLORIDE 300 MG/1
CAPSULE ORAL
COMMUNITY
Start: 2020-01-02 | End: 2020-10-16

## 2020-01-20 NOTE — PROGRESS NOTES
Subjective:       Chief Complaint  Chief Complaint   Patient presents with    Hospital Follow Up       HPI  Jorge Joshua is a 64 y.o. female with multiple medical diagnoses as listed in the medical history and problem list that presents for hospital follow-up.    H[facility]: [facility]  Admitted 12/2/19  Discharged 12/6/19    Fractured her ankle on 08/26/2019 and subsequently had to have hardware removed on 12/02/2019 due to wound infection  She required Vanc/Zosyn which was eventually switched to oral therapy - cultures grew Staph and pseudomonas  Currently with improved swelling and minimal drainage of lateral malleolus wound  Wound vac removed a few weeks prior  Last visit with ID noted improvement in symptoms and had discontinued antibiotics  Minimal pain currently    Reviewed recent labs from 1/9/19 ordered by ID - CRP 42.5    Family and/or Caretaker present at visit?  Yes.  Diagnostic tests reviewed/disposition: I have reviewed all completed as well as pending diagnostic tests at the time of discharge.  Disease/illness education: discussed  Home health/community services discussion/referrals: no current home health.   Establishment or re-establishment of referral orders for community resources: No other necessary community resources.   Discussion with other health care providers: No discussion with other health care providers necessary.       Patient Care Team:  Santos Cortez MD as PCP - General (Internal Medicine)  Beth Acharya MD as Consulting Physician (Hematology and Oncology)  Mj Rose MD as Consulting Physician (Endocrinology)  Flynn Hendricks Jr., MD as Consulting Physician (Rheumatology)  Rafal Morrell MD as Consulting Physician (Cardiology)  Vicki Padilla LPN as Licensed Practical Nurse  Gonzales Estrada Jr., MD as Consulting Physician (Psychiatry)      PAST MEDICAL HISTORY:  Past Medical History:   Diagnosis Date    Anxiety     Cancer     thyroid    Chronic low back pain      Depression     Dyspnea on exertion 2019    Heart murmur     Hyperlipidemia     Hypertension     Menorrhagia     OA (osteoarthritis) of knee     Obesity     Personal history of colonic polyps        PAST SURGICAL HISTORY:  Past Surgical History:   Procedure Laterality Date    breast reduction  1998     SECTION, LOW TRANSVERSE      HYSTERECTOMY      ROTATOR CUFF REPAIR      TOTAL KNEE ARTHROPLASTY      left       SOCIAL HISTORY:  Social History     Socioeconomic History    Marital status:      Spouse name: kelly    Number of children: 2    Years of education: 12    Highest education level: Not on file   Occupational History    Occupation:      Employer: [employer]   Social Needs    Financial resource strain: Not on file    Food insecurity:     Worry: Not on file     Inability: Not on file    Transportation needs:     Medical: Not on file     Non-medical: Not on file   Tobacco Use    Smoking status: Former Smoker     Types: Cigarettes     Last attempt to quit: 2012     Years since quittin.4    Smokeless tobacco: Never Used   Substance and Sexual Activity    Alcohol use: No    Drug use: No    Sexual activity: Yes     Partners: Male   Lifestyle    Physical activity:     Days per week: Not on file     Minutes per session: Not on file    Stress: Not on file   Relationships    Social connections:     Talks on phone: Not on file     Gets together: Not on file     Attends Mu-ism service: Not on file     Active member of club or organization: Not on file     Attends meetings of clubs or organizations: Not on file     Relationship status: Not on file   Other Topics Concern    Not on file   Social History Narrative    Not on file       FAMILY HISTORY:  Family History   Problem Relation Age of Onset    Hypertension Mother     Colon cancer Father         colon    Alzheimer's disease Father        ALLERGIES AND MEDICATIONS:  updated and reviewed.  Review of patient's allergies indicates:  No Known Allergies  Current Outpatient Medications   Medication Sig Dispense Refill    azaTHIOprine (IMURAN) 50 mg Tab Take 50 mg by mouth once daily.      chlorthalidone (HYGROTEN) 25 MG Tab TAKE 1 TABLET BY MOUTH ONCE DAILY 90 tablet 1    clindamycin (CLEOCIN) 300 MG capsule       clonazePAM (KLONOPIN) 2 MG Tab Take 1 tablet (2 mg) by mouth every morning and 2 tabs (4 mg) every evening      dextroamphetamine-amphetamine 30 mg Tab Take 30 mg by mouth once daily. On workdays      ergocalciferol (ERGOCALCIFEROL) 50,000 unit Cap Take 50,000 Units by mouth every 7 days.       losartan (COZAAR) 100 MG tablet Take 1 tablet (100 mg total) by mouth once daily. 90 tablet 3    paroxetine (PAXIL-CR) 25 MG 24 hr tablet Take 25 mg by mouth 2 (two) times daily.       RHEUMATE 1-1-500 mg Cap Take 1 capsule by mouth once daily.  12    TIROSINT- mcg/mL Soln SQUEEZE CONTENTS OF ONE AMPULE INTO GLASS OF WATER, STIR AND DRINK ONCE DAILY  3    levothyroxine (SYNTHROID) 100 MCG tablet Take 100 mcg by mouth before breakfast.        No current facility-administered medications for this visit.          ROS  Review of Systems      Objective:       Physical Exam  Vitals:    01/20/20 1138   BP: 114/72   BP Location: Left arm   Patient Position: Sitting   BP Method: Medium (Manual)   Pulse: 76   Temp: 97.9 °F (36.6 °C)   TempSrc: Oral   SpO2: 95%   Weight: 81.4 kg (179 lb 9 oz)   Height: 5' (1.524 m)    Body mass index is 35.07 kg/m².  Weight: 81.4 kg (179 lb 9 oz)   Height: 5' (152.4 cm)   Physical Exam   Constitutional: She appears well-developed. No distress.   HENT:   Head: Normocephalic and atraumatic.   Mouth/Throat: Oropharynx is clear and moist.   Eyes: Pupils are equal, round, and reactive to light. Conjunctivae and EOM are normal.   Neck: Normal range of motion. Neck supple. No thyromegaly present.   Cardiovascular: Normal rate, normal heart sounds and  intact distal pulses.   No murmur heard.  Pulmonary/Chest: Effort normal and breath sounds normal.   Musculoskeletal: She exhibits no edema or deformity.   Lymphadenopathy:     She has no cervical adenopathy.   Neurological: She is alert. No cranial nerve deficit.   Skin: Skin is warm and dry.   4 cm healing wound with no significant drainage, erythema or induration of right lateral malleolus   Psychiatric: She has a normal mood and affect. Her behavior is normal.   Vitals reviewed.          Assessment:     1. Hospital discharge follow-up    2. Essential hypertension    3. Sjogren's syndrome, with unspecified organ involvement    4. History of wound infection    5. Elevated C-reactive protein (CRP)      Plan:     Jorge was seen today for hospital follow up.    Diagnoses and all orders for this visit:    Hospital follow-up  History of wound infection  Elevated C-reactive protein (CRP)  S/p wound vac and definitive antibiotic therapy for wound infection with cx positive Staph/pseudomonas  Wound looks good - will obtain inflammatory markers to ensure downtrend  -     Sedimentation rate; Future  -     C-reactive protein; Future    Essential hypertension  BP controlled presently - reviewed anti-hypertensive regimen - continue current therapy  -     Comprehensive metabolic panel; Future  -     CBC auto differential; Future    Sjogren's syndrome, with unspecified organ involvement  Following with Rheumatology - to see upcoming/this week      Follow up if symptoms worsen or fail to improve.    The patient expressed understanding and no barriers to adherence were identified.     1. The patient indicates understanding of these issues and agrees with the plan. Brief care plan is updated and reviewed with the patient as applicable.     2. The patient is given an After Visit Summary that lists all medications with directions, allergies, orders placed during this encounter and follow-up instructions.     3. I have reviewed the  patient's medical information including past medical, family, and social history sections including the medications and allergies.     4. We discussed the patient's current medications. I reconciled the patient's medication list and prepared and supplied needed refills.       Santos Cortez MD  Internal Medicine-Pediatrics

## 2020-01-20 NOTE — PROGRESS NOTES
Telfa pad secured with cling wrap and tape applied to wound on right ankle per order of Dr. Cortez.

## 2020-02-12 ENCOUNTER — TELEPHONE (OUTPATIENT)
Dept: SPORTS MEDICINE | Facility: CLINIC | Age: 65
End: 2020-02-12

## 2020-02-12 NOTE — TELEPHONE ENCOUNTER
Left message returning patients call. Let her know in the message she should follow up with Dr Levy for her ankle.

## 2020-02-17 ENCOUNTER — PATIENT MESSAGE (OUTPATIENT)
Dept: FAMILY MEDICINE | Facility: CLINIC | Age: 65
End: 2020-02-17

## 2020-02-17 NOTE — TELEPHONE ENCOUNTER
Can fax last labs on 1/20/20 ( to [phone] praveen Whitlock, thyroid labs were last performed 4 months ago (10/14/19) - can fax this as well

## 2020-02-24 ENCOUNTER — PATIENT MESSAGE (OUTPATIENT)
Dept: FAMILY MEDICINE | Facility: CLINIC | Age: 65
End: 2020-02-24

## 2020-02-28 ENCOUNTER — PATIENT MESSAGE (OUTPATIENT)
Dept: FAMILY MEDICINE | Facility: CLINIC | Age: 65
End: 2020-02-28

## 2020-02-28 DIAGNOSIS — I10 ESSENTIAL HYPERTENSION: ICD-10-CM

## 2020-02-29 ENCOUNTER — TELEPHONE (OUTPATIENT)
Dept: FAMILY MEDICINE | Facility: CLINIC | Age: 65
End: 2020-02-29

## 2020-02-29 RX ORDER — LOSARTAN POTASSIUM 100 MG/1
100 TABLET ORAL DAILY
Qty: 90 TABLET | Refills: 1 | Status: SHIPPED | OUTPATIENT
Start: 2020-02-29 | End: 2020-03-02 | Stop reason: SDUPTHER

## 2020-02-29 RX ORDER — LOSARTAN POTASSIUM 100 MG/1
TABLET ORAL
Qty: 90 TABLET | Refills: 2 | Status: SHIPPED | OUTPATIENT
Start: 2020-02-29 | End: 2020-04-21

## 2020-02-29 RX ORDER — TRIAMTERENE/HYDROCHLOROTHIAZID 37.5-25 MG
TABLET ORAL
Qty: 90 TABLET | Refills: 2 | Status: SHIPPED | OUTPATIENT
Start: 2020-02-29 | End: 2020-04-21

## 2020-03-02 DIAGNOSIS — I10 ESSENTIAL HYPERTENSION: ICD-10-CM

## 2020-03-02 RX ORDER — LOSARTAN POTASSIUM 100 MG/1
100 TABLET ORAL DAILY
Qty: 90 TABLET | Refills: 1 | Status: SHIPPED | OUTPATIENT
Start: 2020-03-02 | End: 2020-10-05

## 2020-03-02 NOTE — TELEPHONE ENCOUNTER
----- Message from Bella Maurice sent at 3/2/2020  9:39 AM CST -----  Contact:  Patient [phone]  Type: RX Refill Request    Who Called: Patient    Have you contacted your pharmacy: Yes. Was told she was out of refills.    Refill or New Rx: Refill    RX Name and Strength: losartan (COZAAR) 100 MG tablet    Is this a 30 day or 90 day RX: 90 day    Preferred Pharmacy with phone number: .  [facility] - [address] [address]  Phone: [phone] Fax: [phone]    Local or Mail Order: Local    Would the patient rather a call back or a response via [facility]? Call back    Best Call Back Number: [phone]    Additional Information: Patient is out of the medication. Please call in ASAP!

## 2020-03-10 ENCOUNTER — PATIENT MESSAGE (OUTPATIENT)
Dept: FAMILY MEDICINE | Facility: CLINIC | Age: 65
End: 2020-03-10

## 2020-03-10 DIAGNOSIS — R11.0 NAUSEA: Primary | ICD-10-CM

## 2020-03-11 RX ORDER — ONDANSETRON 4 MG/1
4 TABLET, FILM COATED ORAL EVERY 8 HOURS PRN
Qty: 28 TABLET | Refills: 2 | Status: SHIPPED | OUTPATIENT
Start: 2020-03-11 | End: 2020-10-16

## 2020-03-13 ENCOUNTER — TELEPHONE (OUTPATIENT)
Dept: FAMILY MEDICINE | Facility: CLINIC | Age: 65
End: 2020-03-13

## 2020-03-13 NOTE — TELEPHONE ENCOUNTER
Please have patient contact Hematology regarding (since I cannot comment on the necessity or risk of procedure not being the one performing it)

## 2020-03-13 NOTE — TELEPHONE ENCOUNTER
----- Message from Xochilt Davis sent at 3/13/2020  2:58 PM CDT -----  Contact: Pt   Name of Who is Calling: MARK POSADA [[id number]]    What is the request in detail: Patient is requesting a call back regards should she have her procedure with the Covid-19 going on .............. Please contact to further discuss and advise      Can the clinic reply by [facility]: Yes     What Number to Call Back if not in [facility]:  [phone] (home) [phone] (work)

## 2020-03-13 NOTE — TELEPHONE ENCOUNTER
Pt would like to know should she still get her bone marrow done for her ankle infection and she also has a low immune system. Pt has procedure monday

## 2020-04-11 ENCOUNTER — PATIENT MESSAGE (OUTPATIENT)
Dept: FAMILY MEDICINE | Facility: CLINIC | Age: 65
End: 2020-04-11

## 2020-04-20 PROBLEM — Z85.850 HISTORY OF THYROID CANCER: Status: ACTIVE | Noted: 2020-04-20

## 2020-04-21 ENCOUNTER — OFFICE VISIT (OUTPATIENT)
Dept: FAMILY MEDICINE | Facility: CLINIC | Age: 65
End: 2020-04-21
Payer: COMMERCIAL

## 2020-04-21 DIAGNOSIS — M25.571 ACUTE RIGHT ANKLE PAIN: ICD-10-CM

## 2020-04-21 DIAGNOSIS — I10 ESSENTIAL HYPERTENSION: Primary | Chronic | ICD-10-CM

## 2020-04-21 PROCEDURE — 99443 PR PHYSICIAN TELEPHONE EVALUATION 21-30 MIN: ICD-10-PCS | Mod: 95,,, | Performed by: INTERNAL MEDICINE

## 2020-04-21 PROCEDURE — 99443 PR PHYSICIAN TELEPHONE EVALUATION 21-30 MIN: CPT | Mod: 95,,, | Performed by: INTERNAL MEDICINE

## 2020-04-21 RX ORDER — HYDROCODONE BITARTRATE AND ACETAMINOPHEN 7.5; 325 MG/1; MG/1
1 TABLET ORAL EVERY 8 HOURS PRN
Qty: 20 TABLET | Refills: 0 | Status: SHIPPED | OUTPATIENT
Start: 2020-04-21 | End: 2020-04-28

## 2020-04-21 RX ORDER — LIOTHYRONINE SODIUM 5 UG/1
TABLET ORAL
COMMUNITY
Start: 2020-04-16 | End: 2020-10-16

## 2020-04-21 RX ORDER — CHLORTHALIDONE 25 MG/1
25 TABLET ORAL DAILY
Qty: 90 TABLET | Refills: 1 | Status: SHIPPED | OUTPATIENT
Start: 2020-04-21 | End: 2020-10-16 | Stop reason: SDUPTHER

## 2020-04-21 NOTE — PROGRESS NOTES
Established Patient - Audio Only Telehealth Visit     The patient location is: [place] / [place]  The chief complaint leading to consultation is: elevated blood pressure  Visit type: Virtual visit with audio only (telephone)  Time spent during this encounter: 25 minutes     The reason for the audio only service rather than synchronous audio and video virtual visit was related to technical difficulties or patient preference/necessity.     Each patient to whom I provide medical services by telemedicine is:  (1) informed of the relationship between the physician and patient and the respective role of any other health care provider with respect to management of the patient; and (2) notified that they may decline to receive medical services by telemedicine and may withdraw from such care at any time. Patient verbally consented to receive this service via voice-only telephone call.    HPI: patient describes current pain in her lower extremity in the setting of recent problems with ankle (s/p extensive left ankle surgery per Dr Abel Garcia)  She reports having a bone scan to evaluate the extent of her disease process further   She is currently in excruciating pain - Tylenol is not helpful presently nor is tramadol prescribed per Rheumatology/Dr Hendricks  Blood pressure will become elevated to over 150-160 systolic  Has seen another foot/ankle specialist at [facility] recently (Dr Jovel)  Saw Dr Troncoso/Endocrinology and was told her TSH is out of range        Assessment and plan:  Elevated blood pressure/Hypertension - secondary to pain. Also, she should be put back on chlorthalidone 25 mg daily in lieu of Maxide which she has been taking. Continue Losartan 100 mg daily. Also, we plan to prescribe a very limited course of hydrocodone-acetominophen for pain with instruction to present to the ED if her symptoms are not improving - I explained the short term nature of such a prescription as well as the risks associated with  concomitant benzodiazepine usage. [place] [facility] database queried/reviewed. She is to return to her Orthopedic specialists for re-evaluation of her ankle joint and discussion of being fitted for a new boot.        Santos Cortez MD  Internal Medicine-Pediatrics          This service was not originating from a related E/M service provided within the previous 7 days nor will  to an E/M service or procedure within the next 24 hours or my soonest available appointment.  Prevailing standard of care was able to be met in this audio-only visit.

## 2020-04-27 ENCOUNTER — PATIENT MESSAGE (OUTPATIENT)
Dept: FAMILY MEDICINE | Facility: CLINIC | Age: 65
End: 2020-04-27

## 2020-04-28 ENCOUNTER — PATIENT MESSAGE (OUTPATIENT)
Dept: FAMILY MEDICINE | Facility: CLINIC | Age: 65
End: 2020-04-28

## 2020-04-28 ENCOUNTER — TELEPHONE (OUTPATIENT)
Dept: FAMILY MEDICINE | Facility: CLINIC | Age: 65
End: 2020-04-28

## 2020-04-28 DIAGNOSIS — R05.9 COUGH: Primary | ICD-10-CM

## 2020-04-28 NOTE — TELEPHONE ENCOUNTER
Will have patient scheduled for SARS-CoV-2 testing - orders have been placed - can call pt to schedule

## 2020-04-28 NOTE — TELEPHONE ENCOUNTER
Spoke with patient she states she was exposed to COVID-19 by her son. Patient wants to know should she go get tested? [facility] She states she has no fever, a little cough, a low immune system which doctor knows about. Please advise

## 2020-04-28 NOTE — TELEPHONE ENCOUNTER
----- Message from Nik Christensen sent at 4/28/2020 12:15 PM CDT -----  Contact: MARK POSADA [[id number]]  Name of Who is Calling: MARK POSADA [[id number]]      What is the request in detail: Would like to speak with staff in regards to 3rd party contact with COVID, states her son has it. States she has no fever, aches, fatigue, and would like to be tested due to her immune issues. Please advise      Can the clinic reply by [facility]: no      [facility]: [phone]

## 2020-04-29 ENCOUNTER — PATIENT MESSAGE (OUTPATIENT)
Dept: FAMILY MEDICINE | Facility: CLINIC | Age: 65
End: 2020-04-29

## 2020-04-29 ENCOUNTER — LAB VISIT (OUTPATIENT)
Dept: FAMILY MEDICINE | Facility: CLINIC | Age: 65
End: 2020-04-29
Payer: COMMERCIAL

## 2020-04-29 DIAGNOSIS — R05.9 COUGH: ICD-10-CM

## 2020-04-29 PROCEDURE — U0002 COVID-19 LAB TEST NON-CDC: HCPCS

## 2020-04-30 ENCOUNTER — PATIENT MESSAGE (OUTPATIENT)
Dept: FAMILY MEDICINE | Facility: CLINIC | Age: 65
End: 2020-04-30

## 2020-04-30 DIAGNOSIS — R11.0 NAUSEA: Primary | ICD-10-CM

## 2020-04-30 LAB — SARS-COV-2 RNA RESP QL NAA+PROBE: NOT DETECTED

## 2020-04-30 RX ORDER — PROMETHAZINE HYDROCHLORIDE 12.5 MG/1
12.5 TABLET ORAL EVERY 8 HOURS PRN
Qty: 30 TABLET | Refills: 0 | Status: SHIPPED | OUTPATIENT
Start: 2020-04-30 | End: 2020-10-16

## 2020-05-01 ENCOUNTER — PATIENT MESSAGE (OUTPATIENT)
Dept: FAMILY MEDICINE | Facility: CLINIC | Age: 65
End: 2020-05-01

## 2020-05-25 ENCOUNTER — TELEPHONE (OUTPATIENT)
Dept: FAMILY MEDICINE | Facility: CLINIC | Age: 65
End: 2020-05-25

## 2020-05-25 NOTE — TELEPHONE ENCOUNTER
----- Message from Nubia Mattson sent at 5/22/2020 12:29 PM CDT -----  Contact: self  Type: Patient Call Back    Who called:self    What is the request in detail:pt need to speak nurse     Can the clinic reply by [facility]?no     Would the patient rather a call back or a response via [facility]? call    Best call back number:

## 2020-07-08 ENCOUNTER — PATIENT MESSAGE (OUTPATIENT)
Dept: FAMILY MEDICINE | Facility: CLINIC | Age: 65
End: 2020-07-08

## 2020-07-08 ENCOUNTER — TELEPHONE (OUTPATIENT)
Dept: FAMILY MEDICINE | Facility: CLINIC | Age: 65
End: 2020-07-08

## 2020-07-08 NOTE — TELEPHONE ENCOUNTER
----- Message from Dotty Hanson sent at 7/8/2020 12:10 PM CDT -----  Regarding: test results  Type: Patient Call Back    Who called:pt    What is the request in detail:wants to discuss test results. She doesn't know the dates. Call pt    Can the clinic reply by [facility]?    Would the patient rather a call back or a response via [facility]? call    Best call back number:[phone] (home) [phone] (work)      Additional Information:

## 2020-07-09 ENCOUNTER — TELEPHONE (OUTPATIENT)
Dept: FAMILY MEDICINE | Facility: CLINIC | Age: 65
End: 2020-07-09

## 2020-07-09 NOTE — TELEPHONE ENCOUNTER
----- Message from Isadora Quinn sent at 7/8/2020  3:54 PM CDT -----  Regarding: self  Type: Patient Call Back    What is the request in detail:wants to discuss test results. She doesn't know the dates.      Can the clinic reply by [facility]?     Would the patient rather a call back or a response via [facility]? call     Best call back number:[phone] (home)

## 2020-07-09 NOTE — TELEPHONE ENCOUNTER
----- Message from Nik Christensen sent at 7/9/2020  9:15 AM CDT -----  Regarding: Results  Contact: MARK POSADA [[id number]]  Type:  Patient Returning Call    Who Called: MARK POSADA [[id number]]    Who Left Message for Patient: Alba    Does the patient know what this is regarding?: yes    Would the patient rather a call back or a response via [facility]? call    Best Call Back Number: [phone] (work until 4:30)    Additional Information: Rheum physician would like to know if Dr. Cortez read her results, due to low levels. Please advise ASAP

## 2020-07-20 ENCOUNTER — NURSE TRIAGE (OUTPATIENT)
Dept: ADMINISTRATIVE | Facility: CLINIC | Age: 65
End: 2020-07-20

## 2020-07-20 NOTE — TELEPHONE ENCOUNTER
Spoke with pt: works at [employer].       has had dizziness in past and was evaluated for this in past. Was given antivert and did help.     Friday evening started with s/s: fatigue, BP WDL, does have auto immune problems.    Saturday am had dizziness:  dizzy with imbalance.is hydrating and resting in bed. Also with  Nose has congestion and fluid in ear.     This am having lung pain- like has been coughing a lot but has not been coughing a lot. Mostly right side. Unable to describe. Has been constant. Rated as moderate. Pain located to back.     Ambulated 15 minutes ago: improved balance. But still felt dizzy and slight spinning.      protocol advice given and pt verbalizes understanding.           Reason for Disposition   [1] Symptoms of COVID-19 (e.g., cough, fever, SOB, or others) AND [2] lives in an area with community spread   Lightheadedness (dizziness) present now, after 2 hours of rest and fluids   Patient sounds very sick or weak to the triager    Additional Information   Negative: SEVERE difficulty breathing (e.g., struggling for each breath, speaks in single words)   Negative: Difficult to awaken or acting confused (e.g., disoriented, slurred speech)   Negative: Bluish (or gray) lips or face now   Negative: Shock suspected (e.g., cold/pale/clammy skin, too weak to stand, low BP, rapid pulse)   Negative: Sounds like a life-threatening emergency to the triager   Negative: Shock suspected (e.g., cold/pale/clammy skin, too weak to stand, low BP, rapid pulse)   Negative: Difficult to awaken or acting confused (e.g., disoriented, slurred speech)   Negative: Fainted, and still feels dizzy afterwards   Negative: Severe difficulty breathing (e.g., struggling for each breath, speaks in single words)   Negative: Overdose (accidental or intentional) of medications   Negative: New neurologic deficit that is present now: * Weakness of the face, arm, or leg on one side of the body * Numbness of the face,  arm, or leg on one side of the body * Loss of speech or garbled speech   Negative: Heart beating < 50 beats per minute OR > 140 beats per minute   Negative: Sounds like a life-threatening emergency to the triager   Negative: SEVERE dizziness (e.g., unable to stand, requires support to walk, feels like passing out now)   Negative: Severe headache   Negative: Difficulty breathing   Negative: Extra heart beats OR irregular heart beating (i.e., 'palpitations')   Negative: Drinking very little and has signs of dehydration (e.g., no urine > 12 hours, very dry mouth, very lightheaded)   Negative: Follows bleeding (e.g., stomach, rectum, vagina) (Exception: became dizzy from sight of small amount blood)   Negative: Patient sounds very sick or weak to the triager   Negative: MODERATE difficulty breathing (e.g., speaks in phrases, SOB even at rest, pulse 100-120)   Negative: SEVERE or constant chest pain or pressure (Exception: mild central chest pain, present only when coughing)   Negative: MILD difficulty breathing (e.g., minimal/no SOB at rest, SOB with walking, pulse <100)   Negative: Chest pain   Negative: [1] Fever > 101 F (38.3 C) AND [2] age > 60   Negative: [1] Fever > 100.0 F (37.8 C) AND [2] bedridden (e.g., nursing home patient, CVA, chronic illness, recovering from surgery)   Negative: Fever > 103 F (39.4 C)   Negative: HIGH RISK patient (e.g., age > 64 years, diabetes, heart or lung disease, weak immune system)    Protocols used: CORONAVIRUS (COVID-19) EXPOSURE-A-OH, DIZZINESS-A-OH, CORONAVIRUS (COVID-19) DIAGNOSED OR [id number]

## 2020-07-21 ENCOUNTER — TELEPHONE (OUTPATIENT)
Dept: FAMILY MEDICINE | Facility: CLINIC | Age: 65
End: 2020-07-21

## 2020-07-21 NOTE — TELEPHONE ENCOUNTER
----- Message from Dotty Hanson sent at 7/21/2020  1:10 PM CDT -----  Regarding: returned call  Type: Patient Call Back    Who called:pt    What is the request in detail:pt returned the nurse's phone call. Call pt    Can the clinic reply by [facility]?    Would the patient rather a call back or a response via [facility]? call    Best call back number:[phone] (home) [phone] (work)      Additional Information:

## 2020-07-27 ENCOUNTER — PATIENT MESSAGE (OUTPATIENT)
Dept: FAMILY MEDICINE | Facility: CLINIC | Age: 65
End: 2020-07-27

## 2020-07-27 DIAGNOSIS — I10 ESSENTIAL HYPERTENSION: Primary | Chronic | ICD-10-CM

## 2020-08-11 ENCOUNTER — PATIENT MESSAGE (OUTPATIENT)
Dept: FAMILY MEDICINE | Facility: CLINIC | Age: 65
End: 2020-08-11

## 2020-08-11 DIAGNOSIS — Z12.31 SCREENING MAMMOGRAM, ENCOUNTER FOR: Primary | ICD-10-CM

## 2020-08-13 ENCOUNTER — PATIENT MESSAGE (OUTPATIENT)
Dept: FAMILY MEDICINE | Facility: CLINIC | Age: 65
End: 2020-08-13

## 2020-08-13 LAB
BUN SERPL-MCNC: 16 MG/DL (ref 7–25)
BUN/CREAT SERPL: ABNORMAL (CALC) (ref 6–22)
CALCIUM SERPL-MCNC: 9.4 MG/DL (ref 8.6–10.4)
CHLORIDE SERPL-SCNC: 100 MMOL/L (ref 98–110)
CO2 SERPL-SCNC: 36 MMOL/L (ref 20–32)
CREAT SERPL-MCNC: 0.66 MG/DL (ref 0.5–0.99)
GFRSERPLBLD MDRD-ARVRAT: 94 ML/MIN/1.73M2
GLUCOSE SERPL-MCNC: 81 MG/DL (ref 65–99)
POTASSIUM SERPL-SCNC: 4.2 MMOL/L (ref 3.5–5.3)
SODIUM SERPL-SCNC: 143 MMOL/L (ref 135–146)

## 2020-09-30 DIAGNOSIS — E78.5 DYSLIPIDEMIA: Primary | Chronic | ICD-10-CM

## 2020-10-07 ENCOUNTER — PATIENT MESSAGE (OUTPATIENT)
Dept: FAMILY MEDICINE | Facility: CLINIC | Age: 65
End: 2020-10-07

## 2020-10-07 ENCOUNTER — TELEPHONE (OUTPATIENT)
Dept: FAMILY MEDICINE | Facility: CLINIC | Age: 65
End: 2020-10-07

## 2020-10-07 DIAGNOSIS — R42 DIZZINESS: ICD-10-CM

## 2020-10-07 RX ORDER — MECLIZINE HCL 12.5 MG 12.5 MG/1
12.5 TABLET ORAL 3 TIMES DAILY PRN
Qty: 30 TABLET | Refills: 0 | Status: SHIPPED | OUTPATIENT
Start: 2020-10-07

## 2020-10-07 RX ORDER — MECLIZINE HCL 12.5 MG 12.5 MG/1
TABLET ORAL
Qty: 30 TABLET | Refills: 0 | Status: SHIPPED | OUTPATIENT
Start: 2020-10-07 | End: 2020-10-07 | Stop reason: SDUPTHER

## 2020-10-07 NOTE — TELEPHONE ENCOUNTER
----- Message from Verito Bennett sent at 10/7/2020  1:51 PM CDT -----  Regarding: refill and call back from Ms Chow  Type: RX Refill Request    Who Called: Jorge     Refill or New Rx: refill     RX Name and Strength:meclizine (ANTIVERT) 12.5 mg tablet    Is this a 30 day or 90 day Rx:30 day     Preferred Pharmacy with phone number:[facility] [place], [address]    Would the patient rather a call back or a response via [facility]? Call back     Best Call Back Number:[phone]    Additional Information: the patient is also requesting a call back from MsJodi Alba

## 2020-10-07 NOTE — TELEPHONE ENCOUNTER
----- Message from Apolonia Ingram sent at 10/7/2020 12:47 PM CDT -----  Regarding: Patient Returning Call  Who Called:MARK POSADA [[id number]]    Who Left Message for Patient: Alba Trevino    Does the patient know what this is regarding:Yes     Best Call Back Number: [phone]

## 2020-10-07 NOTE — TELEPHONE ENCOUNTER
Recommend urgent care evaluation or clinic visit with available provider/colleague - may need blood pressure evaluation, repeat labs or otherwise - unable to provide treatment without further evaluation

## 2020-10-08 ENCOUNTER — PATIENT MESSAGE (OUTPATIENT)
Dept: FAMILY MEDICINE | Facility: CLINIC | Age: 65
End: 2020-10-08

## 2020-10-16 ENCOUNTER — OFFICE VISIT (OUTPATIENT)
Dept: FAMILY MEDICINE | Facility: CLINIC | Age: 65
End: 2020-10-16
Payer: COMMERCIAL

## 2020-10-16 VITALS
TEMPERATURE: 97 F | HEIGHT: 60 IN | SYSTOLIC BLOOD PRESSURE: 134 MMHG | WEIGHT: 183.56 LBS | OXYGEN SATURATION: 95 % | DIASTOLIC BLOOD PRESSURE: 82 MMHG | HEART RATE: 76 BPM | BODY MASS INDEX: 36.04 KG/M2

## 2020-10-16 DIAGNOSIS — M25.571 ACUTE RIGHT ANKLE PAIN: ICD-10-CM

## 2020-10-16 DIAGNOSIS — I10 ESSENTIAL HYPERTENSION: Primary | Chronic | ICD-10-CM

## 2020-10-16 DIAGNOSIS — Z23 NEED FOR INFLUENZA VACCINATION: ICD-10-CM

## 2020-10-16 DIAGNOSIS — R42 DIZZINESS: ICD-10-CM

## 2020-10-16 DIAGNOSIS — M35.00 SJOGREN'S SYNDROME, WITH UNSPECIFIED ORGAN INVOLVEMENT: ICD-10-CM

## 2020-10-16 DIAGNOSIS — E03.9 ACQUIRED HYPOTHYROIDISM: ICD-10-CM

## 2020-10-16 PROCEDURE — 99214 PR OFFICE/OUTPT VISIT, EST, LEVL IV, 30-39 MIN: ICD-10-PCS | Mod: 25,S$GLB,, | Performed by: INTERNAL MEDICINE

## 2020-10-16 PROCEDURE — 99999 PR PBB SHADOW E&M-EST. PATIENT-LVL III: CPT | Mod: PBBFAC,,, | Performed by: INTERNAL MEDICINE

## 2020-10-16 PROCEDURE — 90471 IMMUNIZATION ADMIN: CPT | Mod: S$GLB,,, | Performed by: INTERNAL MEDICINE

## 2020-10-16 PROCEDURE — 96372 PR INJECTION,THERAP/PROPH/DIAG2ST, IM OR SUBCUT: ICD-10-PCS | Mod: 59,S$GLB,, | Performed by: INTERNAL MEDICINE

## 2020-10-16 PROCEDURE — 90686 IIV4 VACC NO PRSV 0.5 ML IM: CPT | Mod: S$GLB,,, | Performed by: INTERNAL MEDICINE

## 2020-10-16 PROCEDURE — 90471 FLU VACCINE (QUAD) GREATER THAN OR EQUAL TO 3YO PRESERVATIVE FREE IM: ICD-10-PCS | Mod: S$GLB,,, | Performed by: INTERNAL MEDICINE

## 2020-10-16 PROCEDURE — 99214 OFFICE O/P EST MOD 30 MIN: CPT | Mod: 25,S$GLB,, | Performed by: INTERNAL MEDICINE

## 2020-10-16 PROCEDURE — 90686 FLU VACCINE (QUAD) GREATER THAN OR EQUAL TO 3YO PRESERVATIVE FREE IM: ICD-10-PCS | Mod: S$GLB,,, | Performed by: INTERNAL MEDICINE

## 2020-10-16 PROCEDURE — 96372 THER/PROPH/DIAG INJ SC/IM: CPT | Mod: 59,S$GLB,, | Performed by: INTERNAL MEDICINE

## 2020-10-16 PROCEDURE — 99999 PR PBB SHADOW E&M-EST. PATIENT-LVL III: ICD-10-PCS | Mod: PBBFAC,,, | Performed by: INTERNAL MEDICINE

## 2020-10-16 RX ORDER — CHLORTHALIDONE 25 MG/1
25 TABLET ORAL DAILY
Qty: 90 TABLET | Refills: 1 | Status: SHIPPED | OUTPATIENT
Start: 2020-10-16 | End: 2022-04-11 | Stop reason: SDUPTHER

## 2020-10-16 RX ORDER — KETOROLAC TROMETHAMINE 30 MG/ML
30 INJECTION, SOLUTION INTRAMUSCULAR; INTRAVENOUS
Status: COMPLETED | OUTPATIENT
Start: 2020-10-16 | End: 2020-10-16

## 2020-10-16 RX ORDER — LEVOTHYROXINE SODIUM 112 UG/1
TABLET ORAL
COMMUNITY
Start: 2020-10-12

## 2020-10-16 RX ADMIN — KETOROLAC TROMETHAMINE 30 MG: 30 INJECTION, SOLUTION INTRAMUSCULAR; INTRAVENOUS at 09:10

## 2020-10-16 NOTE — PROGRESS NOTES
Subjective:     Chief Complaint  Chief Complaint   Patient presents with    Follow-up       HPI  Jorge Joshua is a 65 y.o. female with medical diagnoses as listed in the medical history and problem list that presents for above complaint(s).    Right ankle pain s/p reconstruction - chronic pain in the setting of arthritis causing main source of stress    1+ LE on UA recently 10/13    Home blood pressure readings were 154/85 last night  Last weekend between 95 SBP and 150    Having some spotting bloody irritation from nose    Seeing Dr Morrell in , Dr Jovel next week    Taking synthroid 112 mcg M-F and 1.5 tabs on the weekend  Thyroid ultraound     [place] in the past week [place] [facility]    Patient Care Team:  Santos Cortez MD as PCP - General (Internal Medicine)  Beth Acharya MD as Consulting Physician (Hematology and Oncology)  Mj Rose MD as Consulting Physician (Endocrinology)  Flynn Hendricks Jr., MD as Consulting Physician (Rheumatology)  Rafal Morrell MD as Consulting Physician (Cardiology)  Vicki Padilla LPN as Licensed Practical Nurse  Gonzales Estrada Jr., MD as Consulting Physician (Psychiatry)  Vicki Padilla LPN as Care Coordinator      PAST MEDICAL HISTORY:  Past Medical History:   Diagnosis Date    Anxiety     Cancer     thyroid    Chronic low back pain     Depression     Dyspnea on exertion 2019    Heart murmur     Hyperlipidemia     Hypertension     Menorrhagia     OA (osteoarthritis) of knee     Obesity     Personal history of colonic polyps        PAST SURGICAL HISTORY:  Past Surgical History:   Procedure Laterality Date    breast reduction       SECTION, LOW TRANSVERSE      HYSTERECTOMY      ROTATOR CUFF REPAIR      TOTAL KNEE ARTHROPLASTY      left       SOCIAL HISTORY:  Social History     Socioeconomic History    Marital status:      Spouse name: kelly    Number of children: 2     Years of education: 12    Highest education level: Not on file   Occupational History    Occupation:      Employer: [employer]   Social Needs    Financial resource strain: Not on file    Food insecurity     Worry: Not on file     Inability: Not on file    Transportation needs     Medical: Not on file     Non-medical: Not on file   Tobacco Use    Smoking status: Former Smoker     Types: Cigarettes     Quit date: 2012     Years since quittin.2    Smokeless tobacco: Never Used   Substance and Sexual Activity    Alcohol use: No    Drug use: No    Sexual activity: Yes     Partners: Male   Lifestyle    Physical activity     Days per week: Not on file     Minutes per session: Not on file    Stress: Not on file   Relationships    Social connections     Talks on phone: Not on file     Gets together: Not on file     Attends Mosque service: Not on file     Active member of club or organization: Not on file     Attends meetings of clubs or organizations: Not on file     Relationship status: Not on file   Other Topics Concern    Not on file   Social History Narrative    Not on file       FAMILY HISTORY:  Family History   Problem Relation Age of Onset    Hypertension Mother     Colon cancer Father         colon    Alzheimer's disease Father        ALLERGIES AND MEDICATIONS: updated and reviewed.  Review of patient's allergies indicates:  No Known Allergies  Current Outpatient Medications   Medication Sig Dispense Refill    clonazePAM (KLONOPIN) 2 MG Tab Take 1 tablet (2 mg) by mouth every morning and 2 tabs (4 mg) every evening      dextroamphetamine-amphetamine 30 mg Tab Take 30 mg by mouth once daily. On workdays      ergocalciferol (ERGOCALCIFEROL) 50,000 unit Cap Take 50,000 Units by mouth every 7 days.       losartan (COZAAR) 100 MG tablet TAKE 1 TABLET BY MOUTH EVERY DAY 90 tablet 0    RHEUMATE 1-1-500 mg Cap Take 1 capsule by mouth once daily.  12     chlorthalidone (HYGROTEN) 25 MG Tab Take 1 tablet (25 mg total) by mouth once daily. 90 tablet 1    levothyroxine (SYNTHROID) 112 MCG tablet       meclizine (ANTIVERT) 12.5 mg tablet Take 1 tablet (12.5 mg total) by mouth 3 (three) times daily as needed. (Patient not taking: Reported on 10/16/2020) 30 tablet 0     No current facility-administered medications for this visit.          ROS:  Review of Systems   Constitutional: Negative for appetite change, chills, fever and unexpected weight change.   Respiratory: Negative for cough and shortness of breath.    Cardiovascular: Negative for chest pain, palpitations and leg swelling.   Gastrointestinal: Negative for abdominal pain, constipation, diarrhea, nausea and vomiting.   Musculoskeletal: Negative.    Skin: Negative.    Neurological: Positive for dizziness. Negative for light-headedness and headaches.   Psychiatric/Behavioral: Negative for dysphoric mood. The patient is not nervous/anxious.        Objective:       Physical Exam  Vitals:    10/16/20 0814   BP: 134/82   BP Location: Left arm   Patient Position: Sitting   BP Method: Large (Manual)   Pulse: 76   Temp: 97 °F (36.1 °C)   TempSrc: Oral   SpO2: 95%   Weight: 83.3 kg (183 lb 8.5 oz)   Height: 5' (1.524 m)    Body mass index is 35.84 kg/m².  Weight: 83.3 kg (183 lb 8.5 oz)   Height: 5' (152.4 cm)   Physical Exam  Vitals signs reviewed.   Constitutional:       General: She is not in acute distress.     Appearance: She is well-developed.   HENT:      Head: Normocephalic and atraumatic.   Eyes:      Conjunctiva/sclera: Conjunctivae normal.   Neck:      Musculoskeletal: Normal range of motion and neck supple.      Thyroid: No thyromegaly.   Cardiovascular:      Rate and Rhythm: Normal rate.      Heart sounds: Normal heart sounds. No murmur.   Pulmonary:      Effort: Pulmonary effort is normal.      Breath sounds: Normal breath sounds.   Musculoskeletal:         General: No deformity.   Lymphadenopathy:       Cervical: No cervical adenopathy.   Skin:     General: Skin is warm and dry.   Neurological:      Mental Status: She is alert.      Cranial Nerves: No cranial nerve deficit.   Psychiatric:         Behavior: Behavior normal.             Assessment:     1. Essential hypertension    2. Dizziness    3. Acute right ankle pain    4. Need for influenza vaccination    5. Sjogren's syndrome, with unspecified organ involvement    6. Acquired hypothyroidism s/p thyroidectomy in 2017      Plan:     Jorge was seen today for follow-up.    Diagnoses and all orders for this visit:    Essential hypertension  BP controlled presently - however has not been on diuretic - reviewed anti-hypertensive regimen - continue current therapy  -     chlorthalidone (HYGROTEN) 25 MG Tab; Take 1 tablet (25 mg total) by mouth once daily.    Dizziness  Likely in the setting of fluctuating blood pressures    Acute right ankle pain  Given toradol injection  Discussed chronic ankle pain - managed by Ortho/Dr Jovel  -     ketorolac injection 30 mg    Need for influenza vaccination  Counseled regarding seasonal influenza vaccination - administered  -     Influenza - Quadrivalent *Preferred* (6 months+) (PF)    Sjogren's syndrome, with unspecified organ involvement  Discussed - stable    Acquired hypothyroidism s/p thyroidectomy in 2017  Following with Dr Bolanos      Health Maintenance       Date Due Completion Date    HIV Screening 09/02/1970 ---    Shingles Vaccine (1 of 2) 09/02/2006 ---    Colorectal Cancer Screening 06/11/2017 6/11/2012    Mammogram 03/04/2020 3/4/2019    Override on 1/2/2012: Done    Override on 11/8/2011: Done    Influenza Vaccine (1) 08/01/2020 11/11/2019    Lipid Panel 06/20/2024 6/20/2019    TETANUS VACCINE 11/11/2029 11/11/2019        Health Maintenance reviewed and addressed as per orders    Follow up in 2 months (on 12/16/2020) for chronic medical problems.    The patient expressed understanding and no barriers to  adherence were identified.     1. The patient indicates understanding of these issues and agrees with the plan. Brief care plan is updated and reviewed with the patient as applicable.     2. The patient is given an After Visit Summary that lists all medications with directions, allergies, orders placed during this encounter and follow-up instructions.     3. I have reviewed the patient's medical information including past medical, family, and social history sections including the medications and allergies.     4. We discussed the patient's current medications. I reconciled the patient's medication list and prepared and supplied needed refills.       Santos Cortez MD  Internal Medicine-Pediatrics

## 2020-11-11 ENCOUNTER — LAB VISIT (OUTPATIENT)
Dept: LAB | Facility: HOSPITAL | Age: 65
End: 2020-11-11
Attending: INTERNAL MEDICINE
Payer: COMMERCIAL

## 2020-11-11 DIAGNOSIS — E78.5 DYSLIPIDEMIA: Chronic | ICD-10-CM

## 2020-11-11 LAB
ALBUMIN SERPL BCP-MCNC: 3.8 G/DL (ref 3.5–5.2)
ALP SERPL-CCNC: 122 U/L (ref 55–135)
ALT SERPL W/O P-5'-P-CCNC: 18 U/L (ref 10–44)
ANION GAP SERPL CALC-SCNC: 12 MMOL/L (ref 8–16)
AST SERPL-CCNC: 21 U/L (ref 10–40)
BILIRUB SERPL-MCNC: 0.7 MG/DL (ref 0.1–1)
BUN SERPL-MCNC: 22 MG/DL (ref 8–23)
CALCIUM SERPL-MCNC: 9.6 MG/DL (ref 8.7–10.5)
CHLORIDE SERPL-SCNC: 99 MMOL/L (ref 95–110)
CHOLEST SERPL-MCNC: 276 MG/DL (ref 120–199)
CHOLEST/HDLC SERPL: 4.8 {RATIO} (ref 2–5)
CO2 SERPL-SCNC: 30 MMOL/L (ref 23–29)
CREAT SERPL-MCNC: 0.8 MG/DL (ref 0.5–1.4)
EST. GFR  (AFRICAN AMERICAN): >60 ML/MIN/1.73 M^2
EST. GFR  (NON AFRICAN AMERICAN): >60 ML/MIN/1.73 M^2
GLUCOSE SERPL-MCNC: 110 MG/DL (ref 70–110)
HDLC SERPL-MCNC: 57 MG/DL (ref 40–75)
HDLC SERPL: 20.7 % (ref 20–50)
LDLC SERPL CALC-MCNC: 180 MG/DL (ref 63–159)
NONHDLC SERPL-MCNC: 219 MG/DL
POTASSIUM SERPL-SCNC: 3.8 MMOL/L (ref 3.5–5.1)
PROT SERPL-MCNC: 7.5 G/DL (ref 6–8.4)
SODIUM SERPL-SCNC: 141 MMOL/L (ref 136–145)
TRIGL SERPL-MCNC: 195 MG/DL (ref 30–150)

## 2020-11-11 PROCEDURE — 36415 COLL VENOUS BLD VENIPUNCTURE: CPT | Mod: PO

## 2020-11-11 PROCEDURE — 80053 COMPREHEN METABOLIC PANEL: CPT

## 2020-11-11 PROCEDURE — 80061 LIPID PANEL: CPT

## 2020-11-12 ENCOUNTER — PATIENT OUTREACH (OUTPATIENT)
Dept: ADMINISTRATIVE | Facility: OTHER | Age: 65
End: 2020-11-12

## 2020-11-12 NOTE — PROGRESS NOTES
Requested updates within Care Everywhere.  Patient's chart was reviewed for overdue LILY topics.  Media reviewed for outside colonoscopy.  Immunizations reconciled.

## 2020-11-12 NOTE — PROGRESS NOTES
Subjective:   Patient ID:  Jorge Johsua is a 65 y.o. female who presents for follow-up of CAD risk    HPI:The patient is here for CAD risk factors and high CAC and lipids now very bad again after being excellent 18 months ago.    The patient has no chest pain, SOB, TIA, palpitations, syncope or pre-syncope.Patient does not exercise.        Review of Systems   Constitution: Negative for chills, decreased appetite, diaphoresis, fever, malaise/fatigue, night sweats, weight gain and weight loss.   HENT: Negative for congestion, hoarse voice, nosebleeds, sore throat and tinnitus.    Eyes: Negative for blurred vision, double vision, vision loss in left eye, vision loss in right eye, visual disturbance and visual halos.   Cardiovascular: Negative for chest pain, claudication, cyanosis, dyspnea on exertion, irregular heartbeat, leg swelling, near-syncope, orthopnea, palpitations, paroxysmal nocturnal dyspnea and syncope.   Respiratory: Negative for cough, hemoptysis, shortness of breath, sleep disturbances due to breathing, snoring, sputum production and wheezing.    Endocrine: Negative for cold intolerance, heat intolerance, polydipsia, polyphagia and polyuria.   Hematologic/Lymphatic: Negative for adenopathy and bleeding problem. Does not bruise/bleed easily.   Skin: Negative for color change, dry skin, flushing, itching, nail changes, poor wound healing, rash, skin cancer, suspicious lesions and unusual hair distribution.   Musculoskeletal: Positive for arthritis, muscle cramps, muscle weakness and myalgias. Negative for back pain, falls, gout, joint pain, joint swelling and stiffness.   Gastrointestinal: Negative for abdominal pain, anorexia, change in bowel habit, constipation, diarrhea, dysphagia, heartburn, hematemesis, hematochezia, melena and vomiting.   Genitourinary: Negative for decreased libido, dysuria, hematuria, hesitancy and urgency.   Neurological: Negative for excessive daytime sleepiness, dizziness,  focal weakness, headaches, light-headedness, loss of balance, numbness, paresthesias, seizures, sensory change, tremors, vertigo and weakness.   Psychiatric/Behavioral: Negative for altered mental status, depression, hallucinations, memory loss, substance abuse and suicidal ideas. The patient does not have insomnia and is not nervous/anxious.    Allergic/Immunologic: Negative for environmental allergies and hives.       Objective: /72   Pulse 73   Ht 5' (1.524 m)   Wt 82.6 kg (182 lb 1.6 oz)   BMI 35.56 kg/m²      Physical Exam   Constitutional: She is oriented to person, place, and time. She appears well-developed and well-nourished.   HENT:   Head: Normocephalic.   Eyes: Pupils are equal, round, and reactive to light. EOM are normal.   Neck: Normal range of motion. Normal carotid pulses, no hepatojugular reflux and no JVD present. Carotid bruit is not present. No thyromegaly present.   Cardiovascular: Normal rate, regular rhythm, normal heart sounds and intact distal pulses. Exam reveals no gallop and no friction rub.   No murmur heard.  Pulmonary/Chest: Effort normal and breath sounds normal. No tachypnea. No respiratory distress. She has no wheezes. She has no rales. She exhibits no tenderness.   Abdominal: Soft. Bowel sounds are normal. She exhibits no distension and no mass. There is no abdominal tenderness. There is no rebound and no guarding.   Musculoskeletal: Normal range of motion.         General: No tenderness or edema.   Lymphadenopathy:     She has no cervical adenopathy.   Neurological: She is alert and oriented to person, place, and time. No cranial nerve deficit. Coordination normal.   Skin: Skin is warm. No rash noted. No erythema.   Psychiatric: She has a normal mood and affect. Her behavior is normal. Judgment and thought content normal.       Assessment:     1. Agatston CAC score 200-399    2. Dyslipidemia    3. Class 2 severe obesity due to excess calories with serious comorbidity  and body mass index (BMI) of 37.0 to 37.9 in adult    4. Essential hypertension    5. Nonrheumatic aortic valve stenosis    6. Bradycardia    7. History of thyroid cancer    8. Acquired hypothyroidism s/p thyroidectomy in 2017    9. Abnormal stress echo        Plan:   Discussed diet , achieving and maintaining ideal body weight, and exercise.   We reviewed meds in detail.  Reassured-Discussed goals, options, plan.   Really needs lipid treatment  Already on Baby ASA and Omega-3   Atorva 80 just half    Jorge was seen today for mixed hyperlipidemia.    Diagnoses and all orders for this visit:    Agatston CAC score 200-399  -     Lipid Panel; Standing  -     Comprehensive Metabolic Panel; Standing  -     Stress Echo Which stress agent will be used? Pharmacological; Future; Expected date: 11/14/2020  -     aspirin (ECOTRIN) 81 MG EC tablet; Take 1 tablet (81 mg total) by mouth once daily.  -     atorvastatin (LIPITOR) 80 MG tablet; Take 1 tablet (80 mg total) by mouth once daily.    Dyslipidemia  -     Lipid Panel; Standing  -     Comprehensive Metabolic Panel; Standing  -     atorvastatin (LIPITOR) 80 MG tablet; Take 1 tablet (80 mg total) by mouth once daily.    Class 2 severe obesity due to excess calories with serious comorbidity and body mass index (BMI) of 37.0 to 37.9 in adult    Essential hypertension  -     Lipid Panel; Standing  -     Comprehensive Metabolic Panel; Standing    Nonrheumatic aortic valve stenosis  -     Stress Echo Which stress agent will be used? Pharmacological; Future; Expected date: 11/14/2020    Bradycardia    History of thyroid cancer    Acquired hypothyroidism s/p thyroidectomy in 2017  -     TSH; Standing  -     T4, Free; Standing    Abnormal stress echo  -     Stress Echo Which stress agent will be used? Pharmacological; Future; Expected date: 11/14/2020    Other orders  -     paroxetine (PAXIL-CR) 25 MG 24 hr tablet            Follow up in about 15 months (around 2/13/2022) for with  labs; labs 3 months; DSE CFD Lavie day Lavie to read soon.

## 2020-11-13 ENCOUNTER — OFFICE VISIT (OUTPATIENT)
Dept: CARDIOLOGY | Facility: CLINIC | Age: 65
End: 2020-11-13
Payer: COMMERCIAL

## 2020-11-13 VITALS
BODY MASS INDEX: 35.76 KG/M2 | SYSTOLIC BLOOD PRESSURE: 130 MMHG | HEIGHT: 60 IN | HEART RATE: 73 BPM | DIASTOLIC BLOOD PRESSURE: 72 MMHG | WEIGHT: 182.13 LBS

## 2020-11-13 DIAGNOSIS — R00.1 BRADYCARDIA: ICD-10-CM

## 2020-11-13 DIAGNOSIS — R93.1 AGATSTON CAC SCORE 200-399: Primary | ICD-10-CM

## 2020-11-13 DIAGNOSIS — R94.39 ABNORMAL STRESS ECHO: ICD-10-CM

## 2020-11-13 DIAGNOSIS — E66.01 CLASS 2 SEVERE OBESITY DUE TO EXCESS CALORIES WITH SERIOUS COMORBIDITY AND BODY MASS INDEX (BMI) OF 37.0 TO 37.9 IN ADULT: Chronic | ICD-10-CM

## 2020-11-13 DIAGNOSIS — Z85.850 HISTORY OF THYROID CANCER: ICD-10-CM

## 2020-11-13 DIAGNOSIS — E03.9 ACQUIRED HYPOTHYROIDISM: Chronic | ICD-10-CM

## 2020-11-13 DIAGNOSIS — I35.0 NONRHEUMATIC AORTIC VALVE STENOSIS: ICD-10-CM

## 2020-11-13 DIAGNOSIS — E78.5 DYSLIPIDEMIA: Chronic | ICD-10-CM

## 2020-11-13 DIAGNOSIS — I10 ESSENTIAL HYPERTENSION: Chronic | ICD-10-CM

## 2020-11-13 PROCEDURE — 99999 PR PBB SHADOW E&M-EST. PATIENT-LVL IV: ICD-10-PCS | Mod: PBBFAC,,, | Performed by: INTERNAL MEDICINE

## 2020-11-13 PROCEDURE — 99999 PR PBB SHADOW E&M-EST. PATIENT-LVL IV: CPT | Mod: PBBFAC,,, | Performed by: INTERNAL MEDICINE

## 2020-11-13 PROCEDURE — 99215 PR OFFICE/OUTPT VISIT, EST, LEVL V, 40-54 MIN: ICD-10-PCS | Mod: S$GLB,,, | Performed by: INTERNAL MEDICINE

## 2020-11-13 PROCEDURE — 99215 OFFICE O/P EST HI 40 MIN: CPT | Mod: S$GLB,,, | Performed by: INTERNAL MEDICINE

## 2020-11-13 RX ORDER — ASPIRIN 81 MG/1
81 TABLET ORAL DAILY
Qty: 30 TABLET | Refills: 0
Start: 2020-11-13 | End: 2023-02-07

## 2020-11-13 RX ORDER — ATORVASTATIN CALCIUM 80 MG/1
80 TABLET, FILM COATED ORAL DAILY
Qty: 90 TABLET | Refills: 3 | Status: SHIPPED | OUTPATIENT
Start: 2020-11-13 | End: 2020-12-21 | Stop reason: SINTOL

## 2020-11-13 RX ORDER — PAROXETINE HYDROCHLORIDE HEMIHYDRATE 25 MG/1
TABLET, FILM COATED, EXTENDED RELEASE ORAL
COMMUNITY
Start: 2020-11-11

## 2020-11-13 NOTE — PATIENT INSTRUCTIONS
Discussed diet , achieving and maintaining ideal body weight, and exercise.   We reviewed meds in detail.  Reassured-Discussed goals, options, plan.   Really needs lipid treatment  Already on Baby ASA and Omega-3   Atorva 80 just half

## 2020-12-01 ENCOUNTER — HOSPITAL ENCOUNTER (OUTPATIENT)
Dept: CARDIOLOGY | Facility: HOSPITAL | Age: 65
Discharge: HOME | End: 2020-12-01
Attending: INTERNAL MEDICINE
Payer: COMMERCIAL

## 2020-12-01 VITALS
RESPIRATION RATE: 18 BRPM | WEIGHT: 175 LBS | DIASTOLIC BLOOD PRESSURE: 78 MMHG | SYSTOLIC BLOOD PRESSURE: 138 MMHG | BODY MASS INDEX: 34.36 KG/M2 | HEIGHT: 60 IN | HEART RATE: 63 BPM

## 2020-12-01 DIAGNOSIS — R94.39 ABNORMAL STRESS ECHO: ICD-10-CM

## 2020-12-01 DIAGNOSIS — I35.0 NONRHEUMATIC AORTIC VALVE STENOSIS: ICD-10-CM

## 2020-12-01 DIAGNOSIS — R93.1 AGATSTON CAC SCORE 200-399: ICD-10-CM

## 2020-12-01 LAB
ASCENDING AORTA: 2.91 CM
AV INDEX (PROSTH): 0.77
AV MEAN GRADIENT: 5 MMHG
AV PEAK GRADIENT: 10 MMHG
AV VALVE AREA: 2.43 CM2
AV VELOCITY RATIO: 0.68
BSA FOR ECHO PROCEDURE: 1.83 M2
CV ECHO LV RWT: 0.46 CM
CV STRESS BASE HR: 69 BPM
DIASTOLIC BLOOD PRESSURE: 90 MMHG
DOP CALC AO PEAK VEL: 1.6 M/S
DOP CALC AO VTI: 31.75 CM
DOP CALC LVOT AREA: 3.2 CM2
DOP CALC LVOT DIAMETER: 2.01 CM
DOP CALC LVOT PEAK VEL: 1.09 M/S
DOP CALC LVOT STROKE VOLUME: 77.1 CM3
DOP CALCLVOT PEAK VEL VTI: 24.31 CM
E WAVE DECELERATION TIME: 189.22 MSEC
E/A RATIO: 1
E/E' RATIO: 14.17 M/S
ECHO LV POSTERIOR WALL: 1 CM (ref 0.6–1.1)
FRACTIONAL SHORTENING: 29 % (ref 28–44)
INTERVENTRICULAR SEPTUM: 1 CM (ref 0.6–1.1)
IVRT: 122.74 MSEC
LA MAJOR: 4.51 CM
LA MINOR: 4.63 CM
LA WIDTH: 2.7 CM
LEFT ATRIUM SIZE: 3.45 CM
LEFT ATRIUM VOLUME INDEX: 20.5 ML/M2
LEFT ATRIUM VOLUME: 36.18 CM3
LEFT INTERNAL DIMENSION IN SYSTOLE: 3.11 CM (ref 2.1–4)
LEFT VENTRICLE DIASTOLIC VOLUME INDEX: 48.57 ML/M2
LEFT VENTRICLE DIASTOLIC VOLUME: 85.66 ML
LEFT VENTRICLE MASS INDEX: 83 G/M2
LEFT VENTRICLE SYSTOLIC VOLUME INDEX: 21.7 ML/M2
LEFT VENTRICLE SYSTOLIC VOLUME: 38.22 ML
LEFT VENTRICULAR INTERNAL DIMENSION IN DIASTOLE: 4.36 CM (ref 3.5–6)
LEFT VENTRICULAR MASS: 145.68 G
LV LATERAL E/E' RATIO: 12.14 M/S
LV SEPTAL E/E' RATIO: 17 M/S
MV PEAK A VEL: 0.85 M/S
MV PEAK E VEL: 0.85 M/S
MV STENOSIS PRESSURE HALF TIME: 54.87 MS
MV VALVE AREA P 1/2 METHOD: 4.01 CM2
OHS CV CPX 1 MINUTE RECOVERY HEART RATE: 133 BPM
OHS CV CPX 85 PERCENT MAX PREDICTED HEART RATE MALE: 127
OHS CV CPX MAX PREDICTED HEART RATE: 150
OHS CV CPX PATIENT IS FEMALE: 1
OHS CV CPX PATIENT IS MALE: 0
OHS CV CPX PEAK DIASTOLIC BLOOD PRESSURE: 47 MMHG
OHS CV CPX PEAK HEAR RATE: 133 BPM
OHS CV CPX PEAK RATE PRESSURE PRODUCT: NORMAL
OHS CV CPX PEAK SYSTOLIC BLOOD PRESSURE: 148 MMHG
OHS CV CPX PERCENT MAX PREDICTED HEART RATE ACHIEVED: 89
OHS CV CPX RATE PRESSURE PRODUCT PRESENTING: NORMAL
PISA TR MAX VEL: 2.16 M/S
PULM VEIN S/D RATIO: 1.15
PV PEAK D VEL: 0.39 M/S
PV PEAK S VEL: 0.45 M/S
RA MAJOR: 3.85 CM
RA PRESSURE: 3 MMHG
RA WIDTH: 2.64 CM
RIGHT VENTRICULAR END-DIASTOLIC DIMENSION: 3.24 CM
RV TISSUE DOPPLER FREE WALL SYSTOLIC VELOCITY 1 (APICAL 4 CHAMBER VIEW): 11.04 CM/S
SINUS: 2.99 CM
STJ: 2.49 CM
STRESS ST DEPRESSION: 0.5 MM
SYSTOLIC BLOOD PRESSURE: 166 MMHG
TDI LATERAL: 0.07 M/S
TDI SEPTAL: 0.05 M/S
TDI: 0.06 M/S
TR MAX PG: 19 MMHG
TRICUSPID ANNULAR PLANE SYSTOLIC EXCURSION: 1.97 CM
TV REST PULMONARY ARTERY PRESSURE: 22 MMHG

## 2020-12-01 PROCEDURE — 93351 STRESS TTE COMPLETE: CPT

## 2020-12-01 PROCEDURE — 93351 STRESS TTE COMPLETE: CPT | Mod: 26,,, | Performed by: INTERNAL MEDICINE

## 2020-12-01 PROCEDURE — 93351 STRESS ECHO (CUPID ONLY): ICD-10-PCS | Mod: 26,,, | Performed by: INTERNAL MEDICINE

## 2020-12-01 PROCEDURE — 63600175 PHARM REV CODE 636 W HCPCS: Performed by: INTERNAL MEDICINE

## 2020-12-01 RX ORDER — ATROPINE SULFATE 0.1 MG/ML
0.5 INJECTION INTRAVENOUS
Status: COMPLETED | OUTPATIENT
Start: 2020-12-01 | End: 2020-12-01

## 2020-12-01 RX ORDER — DOBUTAMINE HYDROCHLORIDE 400 MG/100ML
30 INJECTION INTRAVENOUS
Status: COMPLETED | OUTPATIENT
Start: 2020-12-01 | End: 2020-12-01

## 2020-12-01 RX ADMIN — DOBUTAMINE HYDROCHLORIDE 30 MCG/KG/MIN: 400 INJECTION INTRAVENOUS at 09:12

## 2020-12-01 RX ADMIN — ATROPINE SULFATE 0.5 MG: 0.1 INJECTION PARENTERAL at 09:12

## 2020-12-04 ENCOUNTER — PATIENT MESSAGE (OUTPATIENT)
Dept: CARDIOLOGY | Facility: CLINIC | Age: 65
End: 2020-12-04

## 2020-12-10 ENCOUNTER — NURSE TRIAGE (OUTPATIENT)
Dept: ADMINISTRATIVE | Facility: CLINIC | Age: 65
End: 2020-12-10

## 2020-12-10 ENCOUNTER — PATIENT MESSAGE (OUTPATIENT)
Dept: FAMILY MEDICINE | Facility: CLINIC | Age: 65
End: 2020-12-10

## 2020-12-10 DIAGNOSIS — M79.10 GENERALIZED MUSCLE ACHE: Primary | ICD-10-CM

## 2020-12-11 ENCOUNTER — LAB VISIT (OUTPATIENT)
Dept: FAMILY MEDICINE | Facility: CLINIC | Age: 65
End: 2020-12-11
Payer: COMMERCIAL

## 2020-12-11 DIAGNOSIS — M79.10 GENERALIZED MUSCLE ACHE: ICD-10-CM

## 2020-12-11 PROCEDURE — U0003 INFECTIOUS AGENT DETECTION BY NUCLEIC ACID (DNA OR RNA); SEVERE ACUTE RESPIRATORY SYNDROME CORONAVIRUS 2 (SARS-COV-2) (CORONAVIRUS DISEASE [COVID-19]), AMPLIFIED PROBE TECHNIQUE, MAKING USE OF HIGH THROUGHPUT TECHNOLOGIES AS DESCRIBED BY CMS-2020-01-R: HCPCS

## 2020-12-11 NOTE — TELEPHONE ENCOUNTER
Patient states she has a history of thyroid cancer. C/o pain all over her body that feels different from her fibromyalgia pain. Also c/o fatigue, chills, headache 3/10, scratchy throat, achy bones, abdominal pain 5/10. She says her son was at her house for [employer] and tested positive for COVID on following Sunday. Spoke to on call provider, Dr. Hardy. She states patient should go to ED tonight to be examined by a physician. Patient advised and voices understanding. Please contact caller directly to discuss any further care advice.    Reason for Disposition   [1] HIGH RISK patient (e.g., age > 64 years, diabetes, heart or lung disease, weak immune system) AND [2] new or worsening symptoms    Additional Information   Negative: SEVERE difficulty breathing (e.g., struggling for each breath, speaks in single words)   Negative: Difficult to awaken or acting confused (e.g., disoriented, slurred speech)   Negative: Bluish (or gray) lips or face now   Negative: Shock suspected (e.g., cold/pale/clammy skin, too weak to stand, low BP, rapid pulse)   Negative: Sounds like a life-threatening emergency to the triager   Negative: [1] COVID-19 exposure AND [2] no symptoms   Negative: [1] Lives with someone known to have influenza (flu test positive) AND [2] flu-like symptoms (e.g., cough, runny nose, sore throat, SOB; with or without fever)   Negative: [1] Adult with possible COVID-19 symptoms AND [2] triager concerned about severity of symptoms or other causes   Negative: Immunization reaction suspected (e.g., fever, headache, muscle aches occurring during days 1-3 days after immunization)   Negative: COVID-19 and breastfeeding, questions about   Negative: SEVERE or constant chest pain or pressure (Exception: mild central chest pain, present only when coughing)   Negative: MODERATE difficulty breathing (e.g., speaks in phrases, SOB even at rest, pulse 100-120)   Negative: [1] Headache AND [2] stiff neck  (can't touch chin to chest)   Negative: MILD difficulty breathing (e.g., minimal/no SOB at rest, SOB with walking, pulse <100)   Negative: Chest pain or pressure   Negative: Patient sounds very sick or weak to the triager   Negative: Fever > 103 F (39.4 C)   Negative: [1] Fever > 101 F (38.3 C) AND [2] age > 60   Negative: [1] Fever > 100.0 F (37.8 C) AND [2] bedridden (e.g., nursing home patient, CVA, chronic illness, recovering from surgery)    Protocols used: CORONAVIRUS (COVID-19) DIAGNOSED OR [id number]

## 2020-12-13 LAB — SARS-COV-2 RNA RESP QL NAA+PROBE: NOT DETECTED

## 2020-12-21 ENCOUNTER — TELEPHONE (OUTPATIENT)
Dept: CARDIOLOGY | Facility: CLINIC | Age: 65
End: 2020-12-21

## 2020-12-21 DIAGNOSIS — E78.5 DYSLIPIDEMIA: Primary | Chronic | ICD-10-CM

## 2020-12-21 RX ORDER — ROSUVASTATIN CALCIUM 40 MG/1
TABLET, COATED ORAL
Qty: 90 TABLET | Refills: 1 | Status: SHIPPED | OUTPATIENT
Start: 2020-12-21 | End: 2022-03-14 | Stop reason: SDUPTHER

## 2020-12-21 NOTE — TELEPHONE ENCOUNTER
----- Message from Denia Casey sent at 12/21/2020 10:23 AM CST -----  Regarding: lipitor  Pt of Dr. Porsche ALONZO 11/13/20    Pt was put on Lipitor about 3 weeks ago and isn't tolerating it well.  She is having joint and muscle pain and her  says she needs to be put on something else.  He can be reached at [phone].    Thank you

## 2021-02-04 ENCOUNTER — TELEPHONE (OUTPATIENT)
Dept: FAMILY MEDICINE | Facility: CLINIC | Age: 66
End: 2021-02-04

## 2021-02-19 ENCOUNTER — TELEPHONE (OUTPATIENT)
Dept: FAMILY MEDICINE | Facility: CLINIC | Age: 66
End: 2021-02-19

## 2021-03-31 ENCOUNTER — PATIENT OUTREACH (OUTPATIENT)
Dept: ADMINISTRATIVE | Facility: HOSPITAL | Age: 66
End: 2021-03-31

## 2021-06-07 DIAGNOSIS — I10 ESSENTIAL HYPERTENSION: ICD-10-CM

## 2021-06-07 RX ORDER — LOSARTAN POTASSIUM 100 MG/1
TABLET ORAL
Qty: 90 TABLET | Refills: 0 | OUTPATIENT
Start: 2021-06-07

## 2021-08-23 ENCOUNTER — TELEPHONE (OUTPATIENT)
Dept: CARDIOLOGY | Facility: CLINIC | Age: 66
End: 2021-08-23

## 2021-08-24 DIAGNOSIS — I10 ESSENTIAL HYPERTENSION: ICD-10-CM

## 2021-08-24 RX ORDER — LOSARTAN POTASSIUM 100 MG/1
100 TABLET ORAL DAILY
Qty: 90 TABLET | Refills: 1 | Status: SHIPPED | OUTPATIENT
Start: 2021-08-24 | End: 2022-03-14 | Stop reason: SDUPTHER

## 2022-03-14 DIAGNOSIS — I10 ESSENTIAL HYPERTENSION: ICD-10-CM

## 2022-03-14 DIAGNOSIS — E78.5 DYSLIPIDEMIA: Chronic | ICD-10-CM

## 2022-03-15 RX ORDER — ROSUVASTATIN CALCIUM 40 MG/1
TABLET, COATED ORAL
Qty: 90 TABLET | Refills: 1 | Status: SHIPPED | OUTPATIENT
Start: 2022-03-15 | End: 2023-06-08 | Stop reason: SDUPTHER

## 2022-03-15 RX ORDER — LOSARTAN POTASSIUM 100 MG/1
100 TABLET ORAL DAILY
Qty: 90 TABLET | Refills: 1 | Status: SHIPPED | OUTPATIENT
Start: 2022-03-15 | End: 2022-04-11 | Stop reason: ALTCHOICE

## 2022-04-11 ENCOUNTER — TELEPHONE (OUTPATIENT)
Dept: CARDIOLOGY | Facility: CLINIC | Age: 67
End: 2022-04-11
Payer: COMMERCIAL

## 2022-04-11 DIAGNOSIS — I10 ESSENTIAL HYPERTENSION: Chronic | ICD-10-CM

## 2022-04-11 RX ORDER — IRBESARTAN 300 MG/1
300 TABLET ORAL DAILY
Qty: 90 TABLET | Refills: 1 | Status: SHIPPED | OUTPATIENT
Start: 2022-04-11 | End: 2022-09-21 | Stop reason: SDUPTHER

## 2022-04-11 RX ORDER — CHLORTHALIDONE 25 MG/1
25 TABLET ORAL DAILY
Qty: 90 TABLET | Refills: 1 | Status: SHIPPED | OUTPATIENT
Start: 2022-04-11

## 2022-04-11 NOTE — TELEPHONE ENCOUNTER
----- Message from Duane Arevalo sent at 4/11/2022 10:21 AM CDT -----  Regarding: Call back  PT requested a call back from Della.  PT stated it is important and she can be reached @ [phone]          Thanks

## 2022-04-11 NOTE — TELEPHONE ENCOUNTER
Returned patient's call but no answer. Left msg on her VM.    Duane GIRON Staff  Caller: Unspecified (Today, 10:21 AM)  PT requested a call back from Della.  PT stated it is important and she can be reached @ [phone]

## 2022-04-11 NOTE — TELEPHONE ENCOUNTER
Per verbal order from dr Morrell: cancel order for amlodipine. Have patient record BP x 1 week and send us update. Pt updated on meds changes: replace losartan w. irbesartan 300 qd and restart chlorthalidone. F/u appt for May w. dr Morrell done and pt agreed to date/time of appointment(s).

## 2022-04-11 NOTE — TELEPHONE ENCOUNTER
----- Message from Rafal Morrell MD sent at 4/11/2022 12:26 PM CDT -----  Regarding: RE: High BP  See someone; change losartan to Ibresartan 300 mg; take the chlorthalidone. Amlodipine 5 mg but start with half at nite,CJL  ----- Message -----  From: Dot Chiu, RN  Sent: 4/11/2022  11:56 AM CDT  To: Rafal Morrell MD, Dot Chiu, RN  Subject: High BP                                          Pt c/o high BP. She is worried about high BP in view of sister had stroke. Also was told by another MD that she should go to ER if DBP is >100.  She wants to know if her losartan should be increased. She feels that it is not working as well since changed generic (from green to white).    Also has been off chlorthalidone for 6 mths due to difficulties having it refilled: last [facility] prescription was oct 2020, might see somebody elsewhere.  She says that she has been lying down all week-end due to her BP.  She has been having chronic ankle pain, not worse over the week-end, but her fibromyalgia flared up w. pain this week-end.  She takes her losartan every morning around [id number].  She denies any c/o.  She gave me readings off her machine. I told her that it was difficult increasing meds when some numbers were low, teaching done. Then she gave me a new sets of readings, going backward in the device which might have had the wrong dates.   I told her to start writing down her BP, at least bid , for a few days (with time and HR), write comments such as increased pain.   She denies swelling, denies SOB  See below latest version of BP readings and original BP readings below.    Final list of BP readings:  Date -- Time -- BP -- HR          09-Apr -- afternoon -- 105/87 --   09-Apr -- 1530 -- 188/94 --   10-Apr -- 1402 -- 111/81 -- 75  10-Apr --  -- 176/102 -- 66  11-Apr -- 2hrs bef -- 176/105 --   11-Apr -- 0900/? -- 176/111 --   Original  listing:  Date -- Time -- BP -- HR    --  --  --    09-Apr -- 1130 -- 145/86 -- 66   09-Apr -- 1500 -- 196/72 -- 76 96/72 originally, heard wrong?  09-Apr -- 1901 -- 101/69 -- 74   10-Apr -- 1101 -- 114/84 -- 62   10-Apr -- 1415 -- 176/102 --    11-Apr -- 0900 -- 178/111 -- 72   11-Apr -- 0900 -- 178/111 -- 72     Please advise on BP and restarting chlorthalidone.  She also needs to be scheduled for f/u.    ----- Message -----  From: Cee Hook  Sent: 4/11/2022  10:58 AM CDT  To: Dot Chiu RN  Subject: elevated                                         The pt is calling to report that her BP is elevated 176/111 and she is in pain. Please call her back @ [phone]. Thanks, Cee

## 2022-08-15 ENCOUNTER — TELEPHONE (OUTPATIENT)
Dept: NEUROLOGY | Facility: CLINIC | Age: 67
End: 2022-08-15
Payer: COMMERCIAL

## 2022-08-15 NOTE — TELEPHONE ENCOUNTER
----- Message from Janina Martinez sent at 8/15/2022 10:47 AM CDT -----  Type: Patient Call Back    Who called:Self    What is the request in detail: Pt is requesting to schedule an appt for back pain. Please call to assist.    Can the clinic reply by [facility]? no    Would the patient rather a call back or a response via [facility]? Call back    Best call back number: [phone] (home) [phone] (work)

## 2022-08-25 ENCOUNTER — TELEPHONE (OUTPATIENT)
Dept: ORTHOPEDICS | Facility: CLINIC | Age: 67
End: 2022-08-25
Payer: COMMERCIAL

## 2022-08-29 ENCOUNTER — TELEPHONE (OUTPATIENT)
Dept: CARDIOLOGY | Facility: CLINIC | Age: 67
End: 2022-08-29
Payer: COMMERCIAL

## 2022-08-29 ENCOUNTER — NURSE TRIAGE (OUTPATIENT)
Dept: ADMINISTRATIVE | Facility: CLINIC | Age: 67
End: 2022-08-29
Payer: COMMERCIAL

## 2022-08-29 RX ORDER — AMLODIPINE BESYLATE 5 MG/1
5 TABLET ORAL NIGHTLY
Qty: 30 TABLET | Refills: 11 | Status: SHIPPED | OUTPATIENT
Start: 2022-08-29 | End: 2023-06-05

## 2022-08-29 NOTE — TELEPHONE ENCOUNTER
Dr Morrell updated verbally on message about bp and stated to order amlodipine 5 q hs. Pt updated and verbalized understanding. Also encouraged to make f/u appt and learn how to check bp (apparently relies on others for it). She verbalized understanding.

## 2022-08-29 NOTE — TELEPHONE ENCOUNTER
Dr Morrell updated on having a message about this pt having high BP by email. He responded that he will look at Epic tonight (not currently available, this is after hours). I told pt that dr Morrell had been updated but that I did now know he would write a prescription tonight/ send her a message, or give me instructions in Am. I recommended going to ER earlier in view of readings. Pt wanted to speak to Ms Diallo, dr Morrell's MA and call transferred to her after I updated her.

## 2022-08-29 NOTE — TELEPHONE ENCOUNTER
----- Message from Dot Chiu RN sent at 8/29/2022  5:03 PM CDT -----  Regarding: High BP  Pt last seen in nov 2020.  States BP has been high since Thursday, no written records. BP has been running today from 150s/97 to 160s/101. Was 197/103 possibly one hour ago.  She has been having a lot of pain issue due to back problems. She has been in constant pain, which has been going on since way before Thursday. She also on several meds ordered outside of [facility] which include Otezla and gabapentin and she wonders of any interaction explaining the increase in BP.   She takes chlorthalidone 25mg qd and irbesartan 300 qAm.  Please advise,    ----- Message -----  From: Yaquelin Vazquez MA  Sent: 8/29/2022   4:33 PM CDT  To: JENISE Blanc the patient would like to talk to you about her blood pressure 170/90 last visit was on 11-  please call [phone]  . Thank you.

## 2022-08-30 ENCOUNTER — TELEPHONE (OUTPATIENT)
Dept: CARDIOLOGY | Facility: CLINIC | Age: 67
End: 2022-08-30
Payer: COMMERCIAL

## 2022-08-30 DIAGNOSIS — E03.9 ACQUIRED HYPOTHYROIDISM: ICD-10-CM

## 2022-08-30 DIAGNOSIS — I10 ESSENTIAL HYPERTENSION: ICD-10-CM

## 2022-08-30 DIAGNOSIS — R93.1 AGATSTON CAC SCORE 200-399: ICD-10-CM

## 2022-08-30 DIAGNOSIS — E78.5 DYSLIPIDEMIA: Primary | ICD-10-CM

## 2022-08-30 NOTE — TELEPHONE ENCOUNTER
----- Message from Cee Hook sent at 8/30/2022 10:27 AM CDT -----  Regarding: please call  The pt is calling to ask a question about her blood pressure medication. Please call her back @[phone]. Thanks, Cee

## 2022-08-30 NOTE — TELEPHONE ENCOUNTER
Pt called to find out if she is still supposed to take her other bp meds. Teaching done on taking her usual meds same way same time and adding up amlodipine at night as instructed yesterday. She verbalized understanding. She said that she will call me back during her lunch break to schedule her labs and f/u appt.

## 2022-08-30 NOTE — TELEPHONE ENCOUNTER
"Patient c/o elevated blood pressure 170/95 and states she spoke to Cardiologist's office of Dr. Rafal Morrell and was advised that a new prescription would be called to [facility] on [address].     New transmission to [facility] noted as "In Progress" today for Amlodipine 5mg by Dr. Rafal Morrell. Verbal order given to [facility] for Amlodipine 5 mg oral every evening. Dispense 30 tablets with 11 refills. PharmacistSummer, states medication will be available for pick-up tonight.    Patient notified of resubmission of prescription of Amlodipine 5mg to [facility] and advised that medication will be available for pick-up tonight. Patient states understanding that medication has been submitted and will be available for pick-up tonight. Patient cites no additional needs at this time.  Reason for Disposition   [1] Prescription prescribed recently is not at pharmacy AND [2] triager has access to patient's EMR AND [3] prescription is recorded in the EMR    Additional Information   Negative: [1] Intentional drug overdose AND [2] suicidal thoughts or ideas   Negative: Drug overdose and triager unable to answer question   Negative: Caller requesting information unrelated to medicine   Negative: Caller requesting information about COVID-19 Vaccine   Negative: Caller requesting information about Emergency Contraception   Negative: Caller requesting information about Combined Birth Control Pills   Negative: Caller requesting information about Progestin Birth Control Pills   Negative: Caller requesting information about Post-Op pain or medicines   Negative: Caller requesting a prescription antibiotic (such as Penicillin) for Strep throat and has a positive culture result   Negative: Caller requesting a prescription anti-viral med (such as Tamiflu) and has influenza (flu)  symptoms   Negative: Immunization reaction suspected   Negative: Rash while taking a medicine or within 3 days of " stopping it   Negative: [1] Asthma and [2] having symptoms of asthma (cough, wheezing, etc.)   Negative: [1] Symptom of illness (e.g., headache, abdominal pain, earache, vomiting) AND [2] more than mild   Negative: Breastfeeding questions about mother's medicines and diet   Negative: MORE THAN A DOUBLE DOSE of a prescription or over-the-counter (OTC) drug   Negative: [1] DOUBLE DOSE (an extra dose or lesser amount) of prescription drug AND [2] any symptoms (e.g., dizziness, nausea, pain, sleepiness)   Negative: [1] DOUBLE DOSE (an extra dose or lesser amount) of over-the-counter (OTC) drug AND [2] any symptoms (e.g., dizziness, nausea, pain, sleepiness)   Negative: Took another person's prescription drug   Negative: [1] DOUBLE DOSE (an extra dose or lesser amount) of prescription drug AND [2] NO symptoms (Exception: a double dose of antibiotics)   Negative: Diabetes drug error or overdose (e.g., took wrong type of insulin or took extra dose)   Negative: [1] Prescription refill request for ESSENTIAL medicine (i.e., likelihood of harm to patient if not taken) AND [2] triager unable to refill per department policy   Negative: [1] Prescription not at pharmacy AND [2] was prescribed by PCP recently (Exception: triager has access to EMR and prescription is recorded there. Go to Home Care and confirm for pharmacy.)   Negative: [1] Pharmacy calling with prescription question AND [2] triager unable to answer question   Negative: [1] Caller has URGENT medicine question about med that PCP or specialist prescribed AND [2] triager unable to answer question   Negative: Medicine patch causing local rash or itching   Negative: [1] Caller has medicine question about med NOT prescribed by PCP AND [2] triager unable to answer question (e.g., compatibility with other med, storage)   Negative: Prescription request for new medicine (not a refill)   Negative: Prescription refill request for a CONTROLLED substance (e.g., narcotics, ADHD  medicines)   Negative: [1] Prescription refill request for NON-ESSENTIAL medicine (i.e., no harm to patient if med not taken) AND [2] triager unable to refill per department policy   Negative: [1] Caller has NON-URGENT medicine question about med that PCP prescribed AND [2] triager unable to answer question   Negative: Caller wants to use a complementary or alternative medicine    Protocols used: Medication Question Call-A-AH

## 2022-08-31 ENCOUNTER — PATIENT MESSAGE (OUTPATIENT)
Dept: CARDIOLOGY | Facility: CLINIC | Age: 67
End: 2022-08-31
Payer: COMMERCIAL

## 2022-08-31 ENCOUNTER — TELEPHONE (OUTPATIENT)
Dept: CARDIOLOGY | Facility: CLINIC | Age: 67
End: 2022-08-31

## 2022-08-31 NOTE — TELEPHONE ENCOUNTER
----- Message from Dot Chiu RN sent at 8/31/2022  4:44 PM CDT -----  Regarding: labs  Orders in but pt states just had a full set of labs done. I am sending her our fax number to get results. I scheduled her for next month.    ----- Message -----  From: Rafal Morrell MD  Sent: 8/29/2022   7:40 PM CDT  To: Daniela Diallo MA, Dot Chiu RN  Subject: FW: High BP                                      No interactions but bad pain increases BP. Amlodipine 5 mg at nite should help. Also , get CBC, comp , TSH and lipids,CJL  ----- Message -----  From: Dot Chiu RN  Sent: 8/29/2022   5:10 PM CDT  To: Rafal Morrell MD, Dot Chiu RN  Subject: High BP                                          Pt last seen in nov 2020.  States BP has been high since Thursday, no written records. BP has been running today from 150s/97 to 160s/101. Was 197/103 possibly one hour ago.  She has been having a lot of pain issue due to back problems. She has been in constant pain, which has been going on since way before Thursday. She also on several meds ordered outside of [facility] which include Otezla and gabapentin and she wonders of any interaction explaining the increase in BP.   She takes chlorthalidone 25mg qd and irbesartan 300 qAm.  Please advise,    ----- Message -----  From: Yaquelin Vazquez MA  Sent: 8/29/2022   4:33 PM CDT  To: JENISE Blanc the patient would like to talk to you about her blood pressure 170/90 last visit was on 11-  please call [phone]  . Thank you.

## 2022-09-21 RX ORDER — IRBESARTAN 300 MG/1
300 TABLET ORAL DAILY
Qty: 90 TABLET | Refills: 3 | Status: SHIPPED | OUTPATIENT
Start: 2022-09-21 | End: 2022-09-22 | Stop reason: SDUPTHER

## 2022-09-22 RX ORDER — IRBESARTAN 300 MG/1
300 TABLET ORAL DAILY
Qty: 90 TABLET | Refills: 3 | Status: SHIPPED | OUTPATIENT
Start: 2022-09-22 | End: 2023-11-21 | Stop reason: SDUPTHER

## 2022-10-13 DIAGNOSIS — R52 PAIN: Primary | ICD-10-CM

## 2022-11-01 NOTE — TELEPHONE ENCOUNTER
Results of stress echo were given to patient. Rx for Amlodipine 5 mg was sent to her pharmacy.    Notes recorded by Rafal Morrell MD on 4/26/2019 at 3:49 PM CDT  Release-Stress was mildly abnormal but she did not go very far and BP was severely high making the test less accurate . Let's increase the HCTZ to a whole pill and add amlodipine 5 mg at nite ( we want most BPs to be in the 120s /70s!) . We will have to do a cath if she develops bad symptoms or if the stress worsens a lot-let's get a stress echo Porsche Morrell to read in 10 months.  
Detail Level: Detailed
Detail Level: Generalized
Detail Level: Simple

## 2022-12-12 ENCOUNTER — TELEPHONE (OUTPATIENT)
Dept: ORTHOPEDICS | Facility: CLINIC | Age: 67
End: 2022-12-12
Payer: COMMERCIAL

## 2022-12-12 NOTE — TELEPHONE ENCOUNTER
LVM informing pt the soonest appt is in feb, we can schedule and place on waitlist or schedule with a different provider.----- Message from Chung Maurice MA sent at 12/12/2022 11:18 AM CST -----    ----- Message -----  From: Hali Garcia  Sent: 12/12/2022  10:39 AM CST  To: Audra Cruz Staff    Type:  Sooner Apoointment Request    Caller is requesting a sooner appointment.  Caller declined first available appointment listed below.  Caller will not accept being placed on the waitlist and is requesting a message be sent to doctor.  Name of Caller:pt  When is the first available appointment?  Symptoms:ankle pain; previous 3 year fall  Would the patient rather a call back or a response via [facility]? call  Best Call Back Number:[phone]  Additional Information: pt would like specifically Dr. Zhu to see her but no slots were open for new patient

## 2023-01-01 NOTE — SUBJECTIVE & OBJECTIVE
Interval HPI:   Overnight events:  VS stable    Eatin%  Nausea: No  Hypoglycemia and intervention: No  Fever: No  TPN and/or TF: No      PMH, PSH, FH, SH updated and reviewed     ROS:    Review of Systems   Constitutional: Negative for unexpected weight change.   Eyes: Negative for visual disturbance.   Respiratory: Negative for shortness of breath.    Cardiovascular: Negative for chest pain.   Gastrointestinal: Negative for abdominal pain.   Genitourinary: Negative for urgency.   Musculoskeletal: Negative for arthralgias.   Skin: Negative for wound.   Neurological: Negative for headaches.   Hematological: Does not bruise/bleed easily.   Psychiatric/Behavioral: Negative for sleep disturbance.       PHYSICAL EXAMINATION:  Vitals:    19 1538   BP: 123/61   Pulse: (!) 56   Resp: 18   Temp:      Body mass index is 39.06 kg/m².    Physical Exam   Constitutional: She is oriented to person, place, and time. She appears well-developed and well-nourished.   HENT:   Head: Normocephalic and atraumatic.   Neck: Neck supple. No thyromegaly present.   Cardiovascular: Normal rate, regular rhythm and normal heart sounds.   Pulmonary/Chest: Effort normal. No respiratory distress.   Abdominal: Soft. There is no tenderness.   Neurological: She is alert and oriented to person, place, and time.   Skin: Skin is warm. No rash noted.   Psychiatric: She has a normal mood and affect. Her behavior is normal.      Verbal/written post procedure instructions were given to patient/caregiver

## 2023-01-17 ENCOUNTER — APPOINTMENT (OUTPATIENT)
Dept: RADIOLOGY | Facility: HOSPITAL | Age: 68
End: 2023-01-17
Attending: ORTHOPAEDIC SURGERY
Payer: COMMERCIAL

## 2023-01-17 DIAGNOSIS — R52 PAIN: ICD-10-CM

## 2023-01-17 PROCEDURE — 73610 XR ANKLE COMPLETE 3 VIEW RIGHT: ICD-10-PCS | Mod: 26,RT,, | Performed by: RADIOLOGY

## 2023-01-17 PROCEDURE — 73610 X-RAY EXAM OF ANKLE: CPT | Mod: 26,RT,, | Performed by: RADIOLOGY

## 2023-01-17 PROCEDURE — 73610 X-RAY EXAM OF ANKLE: CPT | Mod: TC,FY,PN,RT

## 2023-01-18 ENCOUNTER — OFFICE VISIT (OUTPATIENT)
Dept: ORTHOPEDICS | Facility: CLINIC | Age: 68
End: 2023-01-18
Payer: COMMERCIAL

## 2023-01-18 VITALS — WEIGHT: 175.06 LBS | BODY MASS INDEX: 34.37 KG/M2 | HEIGHT: 60 IN

## 2023-01-18 DIAGNOSIS — G89.21 CHRONIC PAIN DUE TO TRAUMA: ICD-10-CM

## 2023-01-18 DIAGNOSIS — M19.071 ARTHRITIS OF RIGHT ANKLE: Primary | ICD-10-CM

## 2023-01-18 PROCEDURE — 99999 PR PBB SHADOW E&M-EST. PATIENT-LVL III: CPT | Mod: PBBFAC,,, | Performed by: ORTHOPAEDIC SURGERY

## 2023-01-18 PROCEDURE — 99203 OFFICE O/P NEW LOW 30 MIN: CPT | Mod: S$GLB,,, | Performed by: ORTHOPAEDIC SURGERY

## 2023-01-18 PROCEDURE — 99999 PR PBB SHADOW E&M-EST. PATIENT-LVL III: ICD-10-PCS | Mod: PBBFAC,,, | Performed by: ORTHOPAEDIC SURGERY

## 2023-01-18 PROCEDURE — 99203 PR OFFICE/OUTPT VISIT, NEW, LEVL III, 30-44 MIN: ICD-10-PCS | Mod: S$GLB,,, | Performed by: ORTHOPAEDIC SURGERY

## 2023-01-18 RX ORDER — OXYCODONE AND ACETAMINOPHEN 10; 325 MG/1; MG/1
1 TABLET ORAL EVERY 8 HOURS PRN
Qty: 21 TABLET | Refills: 0 | Status: SHIPPED | OUTPATIENT
Start: 2023-01-18 | End: 2023-02-07

## 2023-01-18 NOTE — PROGRESS NOTES
Subjective:      Patient ID: Jorge Joshua is a 67 y.o. female.    Chief Complaint: Pain of the Right Ankle      HPI:  This is a 67-year-old female who presents for chronic ankle pain after trauma 08/19/2019.  She reports she sustained a fracture of her ankle and underwent open reduction internal fixation at [facility] by Dr. Garcia.  About six weeks postop during rehab it was noticed that there was a prominence which happened to be a screw backing out laterally and she was taken back to surgery to have the screw removed and shortly thereafter developed drainage and infection from the lateral wound.  She was taken back to the OR for I & D and removal of the lateral hardware and the wound was left open and left to heal secondarily.  She was treated with IV antibiotics and the wound healed within about six weeks.  Subsequently it sounds like she developed progressive arthritis and was seen by Dr. Jovel at [facility] who felt she would be a candidate for an ankle replacement if there was no underlying infection.  Before he was able to work her up he departed for another job elsewhere and she subsequently saw Dr. Swann about a year ago who did not feel she was a candidate for ankle replacement surgery.  She was scheduled to see  her schedule was fairly full and was scheduled with me for evaluation.  She does not report that she has had any symptoms or signs of infection since the original infection.  She has been treated with an [place] AFO for the last couple of years and reports that it has helped but feels that it is wearing out.  She had some adjustments made by the orthotist.  In addition to her ankle problems, she has Sjogren syndrome and psoriatic arthritis and has recently started infusions by her rheumatologist.  She also has been seeing a pain management specialist for back pain.    Past Medical History:   Diagnosis Date    Anxiety     Cancer     thyroid    Chronic low back pain     Depression     Dyspnea  on exertion 2/17/2019    Heart murmur     Hyperlipidemia     Hypertension     Menorrhagia     OA (osteoarthritis) of knee     Obesity     Personal history of colonic polyps        Current Outpatient Medications:     amLODIPine (NORVASC) 5 MG tablet, Take 1 tablet (5 mg total) by mouth every evening., Disp: 30 tablet, Rfl: 11    chlorthalidone (HYGROTEN) 25 MG Tab, Take 1 tablet (25 mg total) by mouth once daily., Disp: 90 tablet, Rfl: 1    clonazePAM (KLONOPIN) 2 MG Tab, Take 1 tablet (2 mg) by mouth every morning and 2 tabs (4 mg) every evening, Disp: , Rfl:     dextroamphetamine-amphetamine 30 mg Tab, Take 30 mg by mouth once daily. On workdays, Disp: , Rfl:     ergocalciferol (ERGOCALCIFEROL) 50,000 unit Cap, Take 50,000 Units by mouth every 7 days. , Disp: , Rfl:     irbesartan (AVAPRO) 300 MG tablet, Take 1 tablet (300 mg total) by mouth once daily. stop losartan and start irbesartan, Disp: 90 tablet, Rfl: 3    levothyroxine (SYNTHROID) 112 MCG tablet, , Disp: , Rfl:     meclizine (ANTIVERT) 12.5 mg tablet, Take 1 tablet (12.5 mg total) by mouth 3 (three) times daily as needed., Disp: 30 tablet, Rfl: 0    paroxetine (PAXIL-CR) 25 MG 24 hr tablet, , Disp: , Rfl:     RHEUMATE 1-1-500 mg Cap, Take 1 capsule by mouth once daily., Disp: , Rfl: 12    rosuvastatin (CRESTOR) 40 MG Tab, Take 0.5-1 tablet daily as directed, Disp: 90 tablet, Rfl: 1    aspirin (ECOTRIN) 81 MG EC tablet, Take 1 tablet (81 mg total) by mouth once daily., Disp: 30 tablet, Rfl: 0    oxyCODONE-acetaminophen (PERCOCET)  mg per tablet, Take 1 tablet by mouth every 8 (eight) hours as needed for Pain., Disp: 21 tablet, Rfl: 0  Review of patient's allergies indicates:  No Known Allergies    Ht 5' (1.524 m)   Wt 79.4 kg (175 lb 0.7 oz)   BMI 34.19 kg/m²     ROS:  Negative for chest pain, shortness of breath, fevers, or unexplained weight loss      Objective:    Ortho Exam      This is a well-developed well-nourished female who walks in with  an antalgic gait.  She has moderate swelling of the right ankle compared to the left.  She has incisional scars that are well healed.  She has diffuse tenderness about her ankle.  She has significant decreased motion of the ankle.  She has good distal pulses.      Assessment:     Imaging:  X-ray of the right ankle was performed on 01/17/2023 which reveals severe destruction of the right ankle joint with fairly normal alignment.  There is synostosis of the fibula to the tibia.  There is subsidence of the talus into the distal tibia.        1. Arthritis of right ankle    2. Chronic pain due to trauma          Plan:       Orders Placed This Encounter    oxyCODONE-acetaminophen (PERCOCET)  mg per tablet       Recommendation:  This is a case of posttraumatic ankle arthritis complicated by a postop infection after her original ORIF as well as systemic inflammatory disease and chronic pain issues.  She would like an opinion whether or not she would be a candidate for an ankle replacement versus fusion so I am going to get an opinion from Dr. Zhu.  If it is felt that she would be a candidate for fusion surgery then I would be willing to attempt an ankle fusion but she may have to go elsewhere for ankle replacement if Dr. Zhu is not available.

## 2023-02-07 ENCOUNTER — OFFICE VISIT (OUTPATIENT)
Dept: ORTHOPEDICS | Facility: CLINIC | Age: 68
End: 2023-02-07
Payer: COMMERCIAL

## 2023-02-07 VITALS — BODY MASS INDEX: 34.37 KG/M2 | HEIGHT: 60 IN | WEIGHT: 175.06 LBS

## 2023-02-07 DIAGNOSIS — Z87.81 STATUS POST ORIF OF FRACTURE OF ANKLE: ICD-10-CM

## 2023-02-07 DIAGNOSIS — M19.271 OTHER SECONDARY OSTEOARTHRITIS OF RIGHT ANKLE: Primary | ICD-10-CM

## 2023-02-07 DIAGNOSIS — Z98.890 STATUS POST ORIF OF FRACTURE OF ANKLE: ICD-10-CM

## 2023-02-07 DIAGNOSIS — Z87.39 HISTORY OF OSTEOMYELITIS: ICD-10-CM

## 2023-02-07 PROCEDURE — 99213 OFFICE O/P EST LOW 20 MIN: CPT | Mod: S$GLB,,, | Performed by: ORTHOPAEDIC SURGERY

## 2023-02-07 PROCEDURE — 99999 PR PBB SHADOW E&M-EST. PATIENT-LVL III: ICD-10-PCS | Mod: PBBFAC,,, | Performed by: ORTHOPAEDIC SURGERY

## 2023-02-07 PROCEDURE — 99213 PR OFFICE/OUTPT VISIT, EST, LEVL III, 20-29 MIN: ICD-10-PCS | Mod: S$GLB,,, | Performed by: ORTHOPAEDIC SURGERY

## 2023-02-07 PROCEDURE — 99999 PR PBB SHADOW E&M-EST. PATIENT-LVL III: CPT | Mod: PBBFAC,,, | Performed by: ORTHOPAEDIC SURGERY

## 2023-02-07 NOTE — PROGRESS NOTES
Subjective:   Chief complaint:   Chief Complaint   Patient presents with    Right Ankle - Pain     Referring provider: Dr. Omid Levy     HPI:   Jorge Joshua is a 67 y.o. female who presents today for evaluation of right ankle pain.  Rates pain as 5/10 with walking. Pain has been ongoing since May 2023 when she had a fall, increased ankle and back started bothering her as well.  Inciting event: fracture, infection .  Treatments tried: Pain management - VIBHA for LBP, [place] gauntlet for right ankle.    Initial injury and ORIF 8/2019 with Dr. Garcia unfortunately complicated by wound breakdown and ?osteomyelitis (Pseudomonas s/p washout 11 and 12/2019 and IV antibiotic therapy, not currently on antibiotics).  She saw Dr. Jovel and was given [place] gauntlet that helps but reports needs new one.  She then saw Dr. Swann who recommended fusion.  She saw Dr. Levy who referred her to me.    Pain is localized to the ankle joint.  Worse with activity and generally better with rest.  She notes increased falls and gait instability related to worsening ankle pain.    PMH notable for Sjorgen syndrome and psoriatic arthritis (on Otezla, sees Dr. Sellers @ [facility]) and chronic pain (spondylosis and fibromyalgia, sees Dr. Villareal, gets CSIs, no narcotics)    Does the patient use tobacco products? No  If so, what and how often? N/A    ROS:  Musculoskeletal: per HPI  Neurological: Positive for burning, tingling and numbness  Heme: Negative for blood thinners; Negative for history of blood clot  Endocrine: Negative for diabetes    Objective:   Exam:  There were no vitals filed for this visit.  General: No acute distress, well-appearing  Neurologic: Alert and oriented x3  Psychiatric: Appropriate mood and affect, cooperative  Cardiovascular: Regular pulse  Respiratory: Breathing on room air  Skin: No rashes or ulcers  Vascular: Palpable DP/PT  Musculoskeletal: Standing examination demonstrates guarded stance.  There is  swelling.   "There is no ecchymosis.  Medial longitudinal arch is neutral.  Healed incisions without sinus or signs of infection.    Focused exam of the right lower extremity demonstrates irritable and limited ankle range of motion.  Hindfoot is flexible.  Ankle dorsiflexion is decreased with negative Silfverskiold  and guarded .  TTP about ankle joint.    Fires TA/GSC/PTT/peroneals.  SILT SP/DP/PT and able to localize.     Imaging:  Prior radiographs were independently interpreted by me.    NWB 3v ankle demonstrates sequela of ankle fracture with end-stage OA/pseudoarthrosis of the joint and associated erosions (suspicious for sequela of osteomyelitis).  There is retained hardware in the medial malleolus.  Medial and lateral malleoli preserved.    Additional records/labs reviewed:  Dr. Levy notes reviewed. Summarized in HPI.    [facility] [place] reviewed- wound breakdown, staph + pseudomonas and "no evidence of osteomyelitis" noted.       Assessment:     1. Other secondary osteoarthritis of right ankle    2. History of osteomyelitis    3. Status post ORIF of fracture of ankle         Patient is seen for multiple problems (not at treatment goal) with uncertain prognosis and with ADLs and/or QOL likely to be impacted without treatment.    Data:  1 results independently interpreted and multiple prior notes reviewed    Treatment plan: Increased risk from treatment plan secondary to immunocompromised status, prior infection and chronic pain, Counseling regarding major surgery with procedural risk factors, and potential for this to be a limb-threatening condition.      I reviewed imaging, clinical history, and diagnosis as above with the patient. I attempted to use layman's terms to educate the patient as well as utilize foot models and/or pictures.   I personally went through imaging with the patient.  This is a difficult and challenging problem.     I do not think patient is an ankle replacement candidate given 1.) appearance of " bone 2.) history of pseudomonas and 3.) immunocompromised status.  She is COFAS II and I think will do equally well with arthrodesis and the risk profile is superior.  She will require staged arthrodesis in my opinion and given my anticipated department, informed patient I wouldn't be able to be the primary surgeon.  That said, I have discussed her case with colleague and we will collaborate in her care.    I'd recommend stage 1.) HWR, open biopsy and antibiotic bead placement with treatment by ID x 6 weeks and 2.) frame vs in-fix ankle arthrodesis pending findings.  I would have low-threshold to use frame for arthrodesis given history of polymicrobial infection and her immunocompromised status.    Discussed potential for this to be a limb threatening condition though if develops nonhealing wound or worsening infection.     Plan:       --patient will think about this and let us know    No orders of the defined types were placed in this encounter.      Past Medical History:   Diagnosis Date    Anxiety     Cancer     thyroid    Chronic low back pain     Depression     Dyspnea on exertion 2019    Heart murmur     Hyperlipidemia     Hypertension     Menorrhagia     OA (osteoarthritis) of knee     Obesity     Personal history of colonic polyps        Past Surgical History:   Procedure Laterality Date    breast reduction       SECTION, LOW TRANSVERSE      HYSTERECTOMY      ROTATOR CUFF REPAIR      TOTAL KNEE ARTHROPLASTY      left       Family History   Problem Relation Age of Onset    Hypertension Mother     Colon cancer Father         colon    Alzheimer's disease Father        Social History     Socioeconomic History    Marital status:      Spouse name: kelly    Number of children: 2    Years of education: 12   Occupational History    Occupation:      Employer: [employer]   Tobacco Use    Smoking status: Former     Types: Cigarettes     Quit date: 2012      Years since quitting: 10.5    Smokeless tobacco: Never   Substance and Sexual Activity    Alcohol use: No    Drug use: No    Sexual activity: Yes     Partners: Male

## 2023-02-15 ENCOUNTER — PATIENT MESSAGE (OUTPATIENT)
Dept: CARDIOLOGY | Facility: CLINIC | Age: 68
End: 2023-02-15
Payer: COMMERCIAL

## 2023-02-17 ENCOUNTER — TELEPHONE (OUTPATIENT)
Dept: ORTHOPEDICS | Facility: CLINIC | Age: 68
End: 2023-02-17
Payer: COMMERCIAL

## 2023-02-17 ENCOUNTER — PATIENT MESSAGE (OUTPATIENT)
Dept: ORTHOPEDICS | Facility: CLINIC | Age: 68
End: 2023-02-17
Payer: COMMERCIAL

## 2023-02-17 DIAGNOSIS — Z87.81 STATUS POST ORIF OF FRACTURE OF ANKLE: ICD-10-CM

## 2023-02-17 DIAGNOSIS — Z98.890 STATUS POST ORIF OF FRACTURE OF ANKLE: ICD-10-CM

## 2023-02-17 DIAGNOSIS — M19.271 OTHER SECONDARY OSTEOARTHRITIS OF RIGHT ANKLE: Primary | ICD-10-CM

## 2023-02-17 NOTE — TELEPHONE ENCOUNTER
Placed order for LONNIE khan and sent to [facility] for scheduling with Tomer Spaulding MS, OTC   Sports Medicine Assistant   [facility]  (P) [phone]  (F) [phone]

## 2023-02-17 NOTE — LETTER
[facility] 5th Fl  [address], [address]  [facility] [address]  Phone: [phone]     Jorge Joshua  1955  [address] [place] [address]  [phone]     02/17/2023    Dear [facility] ([facility] [address] [place] [address]; [phone] (phone) and [phone] (fax),    I am referring you my patient Jorge Joshua for evaluation and fitting of Custom equipment as detailed below.    Diagnosis:     ICD-10-CM ICD-9-CM   1. Other secondary osteoarthritis of right ankle  M19.271 715.27   2. Status post ORIF of fracture of ankle  Z98.890 V45.89    Z87.81 V15.51        Rx: Right Rosana Mckinney Brace  Orthotist discretion: Yes    Sincerely,      Nancy Zhu MD  Foot & Ankle Orthopedic Surgery

## 2023-05-03 ENCOUNTER — PATIENT MESSAGE (OUTPATIENT)
Dept: CARDIOLOGY | Facility: CLINIC | Age: 68
End: 2023-05-03
Payer: COMMERCIAL

## 2023-06-03 ENCOUNTER — LAB VISIT (OUTPATIENT)
Dept: LAB | Facility: HOSPITAL | Age: 68
End: 2023-06-03
Attending: INTERNAL MEDICINE
Payer: COMMERCIAL

## 2023-06-03 DIAGNOSIS — E78.5 DYSLIPIDEMIA: ICD-10-CM

## 2023-06-03 DIAGNOSIS — I10 ESSENTIAL HYPERTENSION: ICD-10-CM

## 2023-06-03 DIAGNOSIS — R93.1 AGATSTON CAC SCORE 200-399: ICD-10-CM

## 2023-06-03 DIAGNOSIS — E03.9 ACQUIRED HYPOTHYROIDISM: ICD-10-CM

## 2023-06-03 LAB
ALBUMIN SERPL BCP-MCNC: 3.5 G/DL (ref 3.5–5.2)
ALP SERPL-CCNC: 100 U/L (ref 55–135)
ALT SERPL W/O P-5'-P-CCNC: 12 U/L (ref 10–44)
ANION GAP SERPL CALC-SCNC: 9 MMOL/L (ref 8–16)
AST SERPL-CCNC: 15 U/L (ref 10–40)
BASOPHILS # BLD AUTO: 0.06 K/UL (ref 0–0.2)
BASOPHILS NFR BLD: 0.8 % (ref 0–1.9)
BILIRUB SERPL-MCNC: 0.5 MG/DL (ref 0.1–1)
BUN SERPL-MCNC: 12 MG/DL (ref 8–23)
CALCIUM SERPL-MCNC: 9.6 MG/DL (ref 8.7–10.5)
CHLORIDE SERPL-SCNC: 105 MMOL/L (ref 95–110)
CHOLEST SERPL-MCNC: 171 MG/DL (ref 120–199)
CHOLEST/HDLC SERPL: 2.4 {RATIO} (ref 2–5)
CO2 SERPL-SCNC: 31 MMOL/L (ref 23–29)
CREAT SERPL-MCNC: 0.7 MG/DL (ref 0.5–1.4)
DIFFERENTIAL METHOD: ABNORMAL
EOSINOPHIL # BLD AUTO: 0.2 K/UL (ref 0–0.5)
EOSINOPHIL NFR BLD: 3.3 % (ref 0–8)
ERYTHROCYTE [DISTWIDTH] IN BLOOD BY AUTOMATED COUNT: 13.2 % (ref 11.5–14.5)
EST. GFR  (NO RACE VARIABLE): >60 ML/MIN/1.73 M^2
GLUCOSE SERPL-MCNC: 83 MG/DL (ref 70–110)
HCT VFR BLD AUTO: 39.3 % (ref 37–48.5)
HDLC SERPL-MCNC: 70 MG/DL (ref 40–75)
HDLC SERPL: 40.9 % (ref 20–50)
HGB BLD-MCNC: 12.2 G/DL (ref 12–16)
IMM GRANULOCYTES # BLD AUTO: 0.03 K/UL (ref 0–0.04)
IMM GRANULOCYTES NFR BLD AUTO: 0.4 % (ref 0–0.5)
LDLC SERPL CALC-MCNC: 88 MG/DL (ref 63–159)
LYMPHOCYTES # BLD AUTO: 1.6 K/UL (ref 1–4.8)
LYMPHOCYTES NFR BLD: 22.4 % (ref 18–48)
MCH RBC QN AUTO: 28.9 PG (ref 27–31)
MCHC RBC AUTO-ENTMCNC: 31 G/DL (ref 32–36)
MCV RBC AUTO: 93 FL (ref 82–98)
MONOCYTES # BLD AUTO: 0.6 K/UL (ref 0.3–1)
MONOCYTES NFR BLD: 8.2 % (ref 4–15)
NEUTROPHILS # BLD AUTO: 4.7 K/UL (ref 1.8–7.7)
NEUTROPHILS NFR BLD: 64.9 % (ref 38–73)
NONHDLC SERPL-MCNC: 101 MG/DL
NRBC BLD-RTO: 0 /100 WBC
PLATELET # BLD AUTO: 317 K/UL (ref 150–450)
PMV BLD AUTO: 9.9 FL (ref 9.2–12.9)
POTASSIUM SERPL-SCNC: 4.9 MMOL/L (ref 3.5–5.1)
PROT SERPL-MCNC: 6.5 G/DL (ref 6–8.4)
RBC # BLD AUTO: 4.22 M/UL (ref 4–5.4)
SODIUM SERPL-SCNC: 145 MMOL/L (ref 136–145)
TRIGL SERPL-MCNC: 65 MG/DL (ref 30–150)
WBC # BLD AUTO: 7.28 K/UL (ref 3.9–12.7)

## 2023-06-03 PROCEDURE — 36415 COLL VENOUS BLD VENIPUNCTURE: CPT | Mod: PO | Performed by: INTERNAL MEDICINE

## 2023-06-03 PROCEDURE — 85025 COMPLETE CBC W/AUTO DIFF WBC: CPT | Performed by: INTERNAL MEDICINE

## 2023-06-03 PROCEDURE — 80053 COMPREHEN METABOLIC PANEL: CPT | Performed by: INTERNAL MEDICINE

## 2023-06-03 PROCEDURE — 80061 LIPID PANEL: CPT | Performed by: INTERNAL MEDICINE

## 2023-06-04 NOTE — PROGRESS NOTES
Your results look fine and do not require any change in treatment. However, we want LDL lower so if Rosuva is not whole pill can she increase -regardless add Ezetimide just half pill to start. Get comp and lipids in 18 weeks    Please contact me if you have any additional concerns.    Sincerely,    Rafal Morrell

## 2023-06-04 NOTE — PROGRESS NOTES
Subjective:   Patient ID:  Jorge Joshua is a 67 y.o. female who presents for follow-up of CVD    HPI:  The patient is here for CAD risk factors and high CAC.  The patient has no chest pain, SOB, TIA, palpitations, syncope or pre-syncope.Patient does not exercise.        Review of Systems   Constitutional: Positive for malaise/fatigue. Negative for chills, decreased appetite, diaphoresis, fever, night sweats, weight gain and weight loss.   HENT:  Negative for congestion, hoarse voice, nosebleeds, sore throat and tinnitus.    Eyes:  Negative for blurred vision, double vision, vision loss in left eye, vision loss in right eye, visual disturbance and visual halos.   Cardiovascular:  Negative for chest pain, claudication, cyanosis, dyspnea on exertion, irregular heartbeat, leg swelling, near-syncope, orthopnea, palpitations, paroxysmal nocturnal dyspnea and syncope.   Respiratory:  Negative for cough, hemoptysis, shortness of breath, sleep disturbances due to breathing, snoring, sputum production and wheezing.    Endocrine: Negative for cold intolerance, heat intolerance, polydipsia, polyphagia and polyuria.   Hematologic/Lymphatic: Negative for adenopathy and bleeding problem. Does not bruise/bleed easily.   Skin:  Negative for color change, dry skin, flushing, itching, nail changes, poor wound healing, rash, skin cancer, suspicious lesions and unusual hair distribution.   Musculoskeletal:  Positive for arthritis, back pain, joint pain, muscle cramps, muscle weakness, myalgias, neck pain and stiffness. Negative for falls, gout and joint swelling.   Gastrointestinal:  Negative for abdominal pain, anorexia, change in bowel habit, constipation, diarrhea, dysphagia, heartburn, hematemesis, hematochezia, melena and vomiting.   Genitourinary:  Negative for decreased libido, dysuria, hematuria, hesitancy and urgency.   Neurological:  Negative for excessive daytime sleepiness, dizziness, focal weakness, headaches,  light-headedness, loss of balance, numbness, paresthesias, seizures, sensory change, tremors, vertigo and weakness.   Psychiatric/Behavioral:  Negative for altered mental status, depression, hallucinations, memory loss, substance abuse and suicidal ideas. The patient does not have insomnia and is not nervous/anxious.    Allergic/Immunologic: Negative for environmental allergies and hives.     Objective: BP (!) 173/81   Pulse 69   Wt 80.7 kg (177 lb 14.6 oz)   SpO2 96%   BMI 34.75 kg/m²      Physical Exam  Constitutional:       Appearance: She is well-developed.   HENT:      Head: Normocephalic.   Eyes:      Pupils: Pupils are equal, round, and reactive to light.   Neck:      Thyroid: No thyromegaly.      Vascular: Normal carotid pulses. No carotid bruit, hepatojugular reflux or JVD.   Cardiovascular:      Rate and Rhythm: Normal rate and regular rhythm.      Pulses: Intact distal pulses.      Heart sounds: Murmur heard.   Harsh midsystolic murmur is present with a grade of 1/6 at the upper right sternal border radiating to the neck.     No friction rub. No gallop.   Pulmonary:      Effort: Pulmonary effort is normal. No tachypnea or respiratory distress.      Breath sounds: Normal breath sounds. No wheezing or rales.   Chest:      Chest wall: No tenderness.   Abdominal:      General: Bowel sounds are normal. There is no distension.      Palpations: Abdomen is soft. There is no mass.      Tenderness: There is no abdominal tenderness. There is no guarding or rebound.   Musculoskeletal:         General: No tenderness. Normal range of motion.      Cervical back: Normal range of motion.   Lymphadenopathy:      Cervical: No cervical adenopathy.   Skin:     General: Skin is warm.      Findings: No erythema or rash.   Neurological:      Mental Status: She is alert and oriented to person, place, and time.      Cranial Nerves: No cranial nerve deficit.      Coordination: Coordination normal.   Psychiatric:          Behavior: Behavior normal.         Thought Content: Thought content normal.         Judgment: Judgment normal.       Assessment:     1. Agatston CAC score 200-399    2. Nonrheumatic aortic valve stenosis    3. Abnormal stress echo    4. History of thyroid cancer    5. Accidental drug overdose, initial encounter    6. Sjogren's syndrome, with unspecified organ involvement    7. Bradycardia    8. History of narcotic addiction    9. Osteoarthritis of both knees, unspecified osteoarthritis type    10. Chronic low back pain with sciatica, sciatica laterality unspecified, unspecified back pain laterality    11. Acquired hypothyroidism s/p thyroidectomy in 2017    12. Dyslipidemia    13. Class 2 severe obesity due to excess calories with serious comorbidity and body mass index (BMI) of 37.0 to 37.9 in adult    14. Essential hypertension        Plan:   Discussed diet , achieving and maintaining ideal body weight, and exercise.   We reviewed meds in detail.  Reassured-Discussed goals, options, plan.  Omega-3 > 800 mg/d combined EPA/DHA.  Goal BP< 130/80.  Goal LDL < 70.  Could repeat stress CFD this year or next  Increase Amlodipine 10 mg at nite 2 of the 5s or one 10  Add Ezetimide         Diagnoses and all orders for this visit:    Agatston CAC score 200-399    Nonrheumatic aortic valve stenosis    Abnormal stress echo    History of thyroid cancer    Accidental drug overdose, initial encounter    Sjogren's syndrome, with unspecified organ involvement    Bradycardia    History of narcotic addiction    Osteoarthritis of both knees, unspecified osteoarthritis type    Chronic low back pain with sciatica, sciatica laterality unspecified, unspecified back pain laterality    Acquired hypothyroidism s/p thyroidectomy in 2017    Dyslipidemia    Class 2 severe obesity due to excess calories with serious comorbidity and body mass index (BMI) of 37.0 to 37.9 in adult    Essential hypertension            Follow up for Labs 4 months DSE  CFD 9 months Porsche Morrell to read.

## 2023-06-05 ENCOUNTER — OFFICE VISIT (OUTPATIENT)
Dept: CARDIOLOGY | Facility: CLINIC | Age: 68
End: 2023-06-05
Payer: COMMERCIAL

## 2023-06-05 ENCOUNTER — HOSPITAL ENCOUNTER (OUTPATIENT)
Dept: CARDIOLOGY | Facility: CLINIC | Age: 68
Discharge: HOME | End: 2023-06-05
Payer: COMMERCIAL

## 2023-06-05 VITALS
HEART RATE: 69 BPM | BODY MASS INDEX: 34.75 KG/M2 | SYSTOLIC BLOOD PRESSURE: 173 MMHG | WEIGHT: 177.94 LBS | OXYGEN SATURATION: 96 % | DIASTOLIC BLOOD PRESSURE: 81 MMHG

## 2023-06-05 DIAGNOSIS — I10 ESSENTIAL HYPERTENSION: Chronic | ICD-10-CM

## 2023-06-05 DIAGNOSIS — R00.1 BRADYCARDIA: ICD-10-CM

## 2023-06-05 DIAGNOSIS — I10 ESSENTIAL HYPERTENSION: Primary | Chronic | ICD-10-CM

## 2023-06-05 DIAGNOSIS — M17.0 OSTEOARTHRITIS OF BOTH KNEES, UNSPECIFIED OSTEOARTHRITIS TYPE: ICD-10-CM

## 2023-06-05 DIAGNOSIS — M54.40 CHRONIC LOW BACK PAIN WITH SCIATICA, SCIATICA LATERALITY UNSPECIFIED, UNSPECIFIED BACK PAIN LATERALITY: ICD-10-CM

## 2023-06-05 DIAGNOSIS — R93.1 AGATSTON CAC SCORE 200-399: Primary | ICD-10-CM

## 2023-06-05 DIAGNOSIS — E66.01 CLASS 2 SEVERE OBESITY DUE TO EXCESS CALORIES WITH SERIOUS COMORBIDITY AND BODY MASS INDEX (BMI) OF 37.0 TO 37.9 IN ADULT: Chronic | ICD-10-CM

## 2023-06-05 DIAGNOSIS — R94.39 ABNORMAL STRESS ECHO: ICD-10-CM

## 2023-06-05 DIAGNOSIS — I35.0 NONRHEUMATIC AORTIC VALVE STENOSIS: ICD-10-CM

## 2023-06-05 DIAGNOSIS — T50.901A ACCIDENTAL DRUG OVERDOSE, INITIAL ENCOUNTER: ICD-10-CM

## 2023-06-05 DIAGNOSIS — F11.21 HISTORY OF NARCOTIC ADDICTION: ICD-10-CM

## 2023-06-05 DIAGNOSIS — E03.9 ACQUIRED HYPOTHYROIDISM: Chronic | ICD-10-CM

## 2023-06-05 DIAGNOSIS — M35.00 SJOGREN'S SYNDROME, WITH UNSPECIFIED ORGAN INVOLVEMENT: ICD-10-CM

## 2023-06-05 DIAGNOSIS — E78.5 DYSLIPIDEMIA: Chronic | ICD-10-CM

## 2023-06-05 DIAGNOSIS — Z85.850 HISTORY OF THYROID CANCER: ICD-10-CM

## 2023-06-05 DIAGNOSIS — G89.29 CHRONIC LOW BACK PAIN WITH SCIATICA, SCIATICA LATERALITY UNSPECIFIED, UNSPECIFIED BACK PAIN LATERALITY: ICD-10-CM

## 2023-06-05 PROCEDURE — 99215 PR OFFICE/OUTPT VISIT, EST, LEVL V, 40-54 MIN: ICD-10-PCS | Mod: S$GLB,,, | Performed by: INTERNAL MEDICINE

## 2023-06-05 PROCEDURE — 99999 PR PBB SHADOW E&M-EST. PATIENT-LVL IV: CPT | Mod: PBBFAC,,, | Performed by: INTERNAL MEDICINE

## 2023-06-05 PROCEDURE — 99999 PR PBB SHADOW E&M-EST. PATIENT-LVL IV: ICD-10-PCS | Mod: PBBFAC,,, | Performed by: INTERNAL MEDICINE

## 2023-06-05 PROCEDURE — 93000 EKG 12-LEAD: ICD-10-PCS | Mod: S$GLB,,, | Performed by: INTERNAL MEDICINE

## 2023-06-05 PROCEDURE — 93000 ELECTROCARDIOGRAM COMPLETE: CPT | Mod: S$GLB,,, | Performed by: INTERNAL MEDICINE

## 2023-06-05 PROCEDURE — 99215 OFFICE O/P EST HI 40 MIN: CPT | Mod: S$GLB,,, | Performed by: INTERNAL MEDICINE

## 2023-06-05 RX ORDER — AMLODIPINE BESYLATE 10 MG/1
10 TABLET ORAL DAILY
Qty: 90 TABLET | Refills: 3 | Status: SHIPPED | OUTPATIENT
Start: 2023-06-05

## 2023-06-05 NOTE — PATIENT INSTRUCTIONS
Discussed diet , achieving and maintaining ideal body weight, and exercise.   We reviewed meds in detail.  Reassured-Discussed goals, options, plan.  Omega-3 > 800 mg/d combined EPA/DHA.  Goal BP< 130/80.  Goal LDL < 70.  Could repeat stress CFD this year or next  Increase Amlodipine 10 mg at nite 2 of the 5s or one 10  Add Ezetimide

## 2023-06-08 DIAGNOSIS — E78.5 DYSLIPIDEMIA: Chronic | ICD-10-CM

## 2023-06-08 RX ORDER — ROSUVASTATIN CALCIUM 40 MG/1
TABLET, COATED ORAL
Qty: 90 TABLET | Refills: 3 | Status: SHIPPED | OUTPATIENT
Start: 2023-06-08

## 2023-07-24 NOTE — ED NOTES
AAO ==Maintained on cardiac monitor    Spoke with pt and pt states that she been diagnosed with IBS and now she believe she is having a flare up. Pt states that she is having rectal bleeding, cramping in abdominal area. This is why pt would like a referral to Gastro to see what is going on first. Please advise   No

## 2023-11-21 RX ORDER — IRBESARTAN 300 MG/1
300 TABLET ORAL DAILY
Qty: 90 TABLET | Refills: 3 | Status: SHIPPED | OUTPATIENT
Start: 2023-11-21 | End: 2024-11-20

## 2024-07-17 ENCOUNTER — TELEPHONE (OUTPATIENT)
Dept: CARDIOLOGY | Facility: CLINIC | Age: 69
End: 2024-07-17
Payer: COMMERCIAL

## 2024-07-17 RX ORDER — ATORVASTATIN CALCIUM 80 MG/1
80 TABLET, FILM COATED ORAL DAILY
Qty: 90 TABLET | Refills: 3 | Status: SHIPPED | OUTPATIENT
Start: 2024-07-17 | End: 2025-07-17

## 2024-07-17 RX ORDER — EVOLOCUMAB 140 MG/ML
140 INJECTION, SOLUTION SUBCUTANEOUS
COMMUNITY
Start: 2024-07-10 | End: 2025-07-10

## 2024-07-17 NOTE — TELEPHONE ENCOUNTER
----- Message from Rafal Morrell MD sent at 7/17/2024  1:55 PM CDT -----  Regarding: RE: cholesterol  Atorva 80 ,CJL  ----- Message -----  From: Dot Chiu RN  Sent: 7/17/2024   1:54 PM CDT  To: Rafal Morrell MD; Dot Chiu RN  Subject: FW: cholesterol                                  Pt states that she threw away her rosuvastatin and it is too early to refill . She wants a different statin ordered to replace.    Please advise  ----- Message -----  From: Rafal Morrell MD  Sent: 7/17/2024   1:44 PM CDT  To: Dot Chiu RN  Subject: RE: cholesterol                                  Unless liver tests were bad the Repatha is given with the statin and statin is not stopped,CJL  ----- Message -----  From: Dot Chiu RN  Sent: 7/17/2024  12:10 PM CDT  To: Rafal Morrell MD; Dot Chiu RN  Subject: cholesterol                                      Pt's endocrinologist run some labs and her LDL went fr 88 to 200+. She told pt to stop rosuvastatin and ordered Repatha 140 for her. Pt would like dr Morrell's opinion on this. She denies muscle cramps/ joints issue w. there Rosuvastatin before she stopped it.  [place] [facility] [place]:    pecimen: Blood - Blood (substance)   Ref Range & Units 1 mo ago Comments  TOTAL CHOLESTEROL - [facility] 100 - 199 mg/dL 303 High    Triglycerides - [facility] 0 - 149 mg/dL 153 High    HDL Cholesterol - [facility] >39 mg/dL 55   VLDL Cholesterol Maximo-[facility] 5 - 40 mg/dL 29   LDL Chol Calc (NIH)-[facility] 0 - 99 mg/dL 219 High    Comment:-[facility]  Comment     Please advise,  ----- Message -----  From: Dianna Maynard  Sent: 7/17/2024  11:52 AM CDT  To: Dot Chiu RN  Subject: Medication                                       Dom [phone] pt spouse says her endocrinologist wants to stop her Crestor 40 mg and put her on another medication. If he doesn't answer you can also call the pt  [phone].    Thanks

## 2024-07-17 NOTE — TELEPHONE ENCOUNTER
Pt updated on dr Morrell's response and verbalized understanding. She will call back to schedule f/u appt.

## 2024-09-25 DIAGNOSIS — R93.1 AGATSTON CAC SCORE 200-399: ICD-10-CM

## 2024-09-25 DIAGNOSIS — I10 ESSENTIAL HYPERTENSION: Chronic | ICD-10-CM

## 2024-09-26 RX ORDER — AMLODIPINE BESYLATE 10 MG/1
10 TABLET ORAL DAILY
Qty: 90 TABLET | Refills: 3 | Status: SHIPPED | OUTPATIENT
Start: 2024-09-26

## 2024-11-13 ENCOUNTER — TELEPHONE (OUTPATIENT)
Dept: CARDIOLOGY | Facility: CLINIC | Age: 69
End: 2024-11-13
Payer: COMMERCIAL

## 2024-11-13 DIAGNOSIS — R00.1 BRADYCARDIA: ICD-10-CM

## 2024-11-13 DIAGNOSIS — I10 ESSENTIAL HYPERTENSION: Primary | Chronic | ICD-10-CM

## 2024-11-13 DIAGNOSIS — R01.1 HEART MURMUR: ICD-10-CM

## 2024-11-13 NOTE — PROGRESS NOTES
Subjective:   Patient ID:  Jorge Joshua is a 69 y.o. female who presents for follow-up of CAD risk    HPI:  The patient is here for CAD risk factors and high CAC.  The patient has no chest pain, SOB, TIA, palpitations, syncope or pre-syncope.Patient does not exercise.BPs she says are good . Not on Chlorthalidone.        Review of Systems   Constitutional: Negative for chills, decreased appetite, diaphoresis, fever, malaise/fatigue, night sweats, weight gain and weight loss.   HENT:  Negative for congestion, hoarse voice, nosebleeds, sore throat and tinnitus.    Eyes:  Negative for blurred vision, double vision, vision loss in left eye, vision loss in right eye, visual disturbance and visual halos.   Cardiovascular:  Negative for chest pain, claudication, cyanosis, dyspnea on exertion, irregular heartbeat, leg swelling, near-syncope, orthopnea, palpitations, paroxysmal nocturnal dyspnea and syncope.   Respiratory:  Negative for cough, hemoptysis, shortness of breath, sleep disturbances due to breathing, snoring, sputum production and wheezing.    Endocrine: Negative for cold intolerance, heat intolerance, polydipsia, polyphagia and polyuria.   Hematologic/Lymphatic: Negative for adenopathy and bleeding problem. Does not bruise/bleed easily.   Skin:  Negative for color change, dry skin, flushing, itching, nail changes, poor wound healing, rash, skin cancer, suspicious lesions and unusual hair distribution.   Musculoskeletal:  Negative for arthritis, back pain, falls, gout, joint pain, joint swelling, muscle cramps, muscle weakness, myalgias and stiffness.   Gastrointestinal:  Negative for abdominal pain, anorexia, change in bowel habit, constipation, diarrhea, dysphagia, heartburn, hematemesis, hematochezia, melena and vomiting.   Genitourinary:  Negative for decreased libido, dysuria, hematuria, hesitancy and urgency.   Neurological:  Negative for excessive daytime sleepiness, dizziness, focal weakness, headaches,  "light-headedness, loss of balance, numbness, paresthesias, seizures, sensory change, tremors, vertigo and weakness.   Psychiatric/Behavioral:  Negative for altered mental status, depression, hallucinations, memory loss, substance abuse and suicidal ideas. The patient does not have insomnia and is not nervous/anxious.    Allergic/Immunologic: Negative for environmental allergies and hives.       Objective: /81 (BP Location: Left arm, Patient Position: Sitting)   Pulse 88   Ht 5' 2" (1.575 m)   Wt 78 kg (171 lb 15.3 oz)   BMI 31.45 kg/m²      Physical Exam  Constitutional:       Appearance: She is well-developed.   HENT:      Head: Normocephalic.   Eyes:      Pupils: Pupils are equal, round, and reactive to light.   Neck:      Thyroid: No thyromegaly.      Vascular: Normal carotid pulses. No carotid bruit, hepatojugular reflux or JVD.   Cardiovascular:      Rate and Rhythm: Normal rate and regular rhythm.      Pulses: Intact distal pulses.      Heart sounds: Normal heart sounds. No murmur heard.     No friction rub. No gallop.   Pulmonary:      Effort: Pulmonary effort is normal. No tachypnea or respiratory distress.      Breath sounds: Normal breath sounds. No wheezing or rales.   Chest:      Chest wall: No tenderness.   Abdominal:      General: Bowel sounds are normal. There is no distension.      Palpations: Abdomen is soft. There is no mass.      Tenderness: There is no abdominal tenderness. There is no guarding or rebound.   Musculoskeletal:         General: No tenderness. Normal range of motion.      Cervical back: Normal range of motion.   Lymphadenopathy:      Cervical: No cervical adenopathy.   Skin:     General: Skin is warm.      Findings: No erythema or rash.   Neurological:      Mental Status: She is alert and oriented to person, place, and time.      Cranial Nerves: No cranial nerve deficit.      Coordination: Coordination normal.   Psychiatric:         Behavior: Behavior normal.         " Thought Content: Thought content normal.         Judgment: Judgment normal.         Assessment:     1. Agatston CAC score 200-399    2. Essential hypertension    3. Class 2 severe obesity due to excess calories with serious comorbidity and body mass index (BMI) of 37.0 to 37.9 in adult    4. Dyslipidemia    5. History of narcotic addiction    6. Aortic valve stenosis, etiology of cardiac valve disease unspecified    7. History of thyroid cancer    8. Abnormal stress echo    9. Impaired fasting blood sugar    Reviewed labs and last stress CFD and prior echo and CAC    Plan:   Discussed diet , achieving and maintaining ideal body weight, and exercise.   We reviewed meds in detail.  Reassured-Discussed goals, options, plan.  Omega-3 > 800 mg/d combined EPA/DHA.  Goal BP< 130/80.  Goal LDL < 70.  Could get Lpa, HSCRP, HSCRP, BNP and repeat stress echo CFD  Best dose of the ADHD meds are as low as dose as needed to reduce symptoms and improve quality of life  Jorge was seen today for hypertension, hyperlipidemia and aortic valve stenosis.    Diagnoses and all orders for this visit:    Agatston CAC score 200-399  -     Lipid Panel; Future  -     Comprehensive Metabolic Panel; Future  -     TSH; Future  -     Lipoprotein A (LPA); Future  -     CRP, High Sensitivity; Future  -     BNP; Future  -     Stress Echo Color Flow Doppler? Yes; Which stress agent will be used? Pharmacological; Future    Essential hypertension  -     Lipid Panel; Future  -     Comprehensive Metabolic Panel; Future  -     TSH; Future  -     Lipoprotein A (LPA); Future  -     CRP, High Sensitivity; Future  -     Vitamin D; Future  -     BNP; Future  -     Stress Echo Color Flow Doppler? Yes; Which stress agent will be used? Pharmacological; Future    Class 2 severe obesity due to excess calories with serious comorbidity and body mass index (BMI) of 37.0 to 37.9 in adult    Dyslipidemia  -     Lipid Panel; Future  -     Comprehensive Metabolic Panel;  Future  -     TSH; Future  -     Lipoprotein A (LPA); Future  -     CRP, High Sensitivity; Future  -     Vitamin D; Future    History of narcotic addiction    Aortic valve stenosis, etiology of cardiac valve disease unspecified    History of thyroid cancer  -     TSH; Future  -     T4, Free; Future    Abnormal stress echo  -     Stress Echo Color Flow Doppler? Yes; Which stress agent will be used? Pharmacological; Future    Impaired fasting blood sugar  -     Hemoglobin A1C; Future            Follow up for Labs now and DSE CFD Porsche Morrell to read soon.

## 2024-11-14 ENCOUNTER — HOSPITAL ENCOUNTER (OUTPATIENT)
Dept: CARDIOLOGY | Facility: CLINIC | Age: 69
Discharge: HOME | End: 2024-11-14
Payer: COMMERCIAL

## 2024-11-14 ENCOUNTER — OFFICE VISIT (OUTPATIENT)
Dept: CARDIOLOGY | Facility: CLINIC | Age: 69
End: 2024-11-14
Payer: COMMERCIAL

## 2024-11-14 ENCOUNTER — LAB VISIT (OUTPATIENT)
Dept: LAB | Facility: HOSPITAL | Age: 69
End: 2024-11-14
Attending: INTERNAL MEDICINE
Payer: COMMERCIAL

## 2024-11-14 VITALS
BODY MASS INDEX: 31.64 KG/M2 | WEIGHT: 171.94 LBS | HEART RATE: 88 BPM | HEIGHT: 62 IN | SYSTOLIC BLOOD PRESSURE: 138 MMHG | DIASTOLIC BLOOD PRESSURE: 81 MMHG

## 2024-11-14 DIAGNOSIS — I35.0 AORTIC VALVE STENOSIS, ETIOLOGY OF CARDIAC VALVE DISEASE UNSPECIFIED: ICD-10-CM

## 2024-11-14 DIAGNOSIS — R73.01 IMPAIRED FASTING BLOOD SUGAR: ICD-10-CM

## 2024-11-14 DIAGNOSIS — Z85.850 HISTORY OF THYROID CANCER: ICD-10-CM

## 2024-11-14 DIAGNOSIS — E66.01 CLASS 2 SEVERE OBESITY DUE TO EXCESS CALORIES WITH SERIOUS COMORBIDITY AND BODY MASS INDEX (BMI) OF 37.0 TO 37.9 IN ADULT: Chronic | ICD-10-CM

## 2024-11-14 DIAGNOSIS — R01.1 HEART MURMUR: ICD-10-CM

## 2024-11-14 DIAGNOSIS — R93.1 AGATSTON CAC SCORE 200-399: ICD-10-CM

## 2024-11-14 DIAGNOSIS — E78.5 DYSLIPIDEMIA: Chronic | ICD-10-CM

## 2024-11-14 DIAGNOSIS — F11.21 HISTORY OF NARCOTIC ADDICTION: ICD-10-CM

## 2024-11-14 DIAGNOSIS — I10 ESSENTIAL HYPERTENSION: Chronic | ICD-10-CM

## 2024-11-14 DIAGNOSIS — R93.1 AGATSTON CAC SCORE 200-399: Primary | ICD-10-CM

## 2024-11-14 DIAGNOSIS — R94.39 ABNORMAL STRESS ECHO: ICD-10-CM

## 2024-11-14 DIAGNOSIS — E66.812 CLASS 2 SEVERE OBESITY DUE TO EXCESS CALORIES WITH SERIOUS COMORBIDITY AND BODY MASS INDEX (BMI) OF 37.0 TO 37.9 IN ADULT: Chronic | ICD-10-CM

## 2024-11-14 DIAGNOSIS — R00.1 BRADYCARDIA: ICD-10-CM

## 2024-11-14 LAB
25(OH)D3+25(OH)D2 SERPL-MCNC: 39 NG/ML (ref 30–96)
ALBUMIN SERPL BCP-MCNC: 3.7 G/DL (ref 3.5–5.2)
ALP SERPL-CCNC: 103 U/L (ref 40–150)
ALT SERPL W/O P-5'-P-CCNC: 16 U/L (ref 10–44)
ANION GAP SERPL CALC-SCNC: 10 MMOL/L (ref 8–16)
AST SERPL-CCNC: 20 U/L (ref 10–40)
BILIRUB SERPL-MCNC: 0.8 MG/DL (ref 0.1–1)
BNP SERPL-MCNC: 60 PG/ML (ref 0–99)
BUN SERPL-MCNC: 16 MG/DL (ref 8–23)
CALCIUM SERPL-MCNC: 9.5 MG/DL (ref 8.7–10.5)
CHLORIDE SERPL-SCNC: 105 MMOL/L (ref 95–110)
CHOLEST SERPL-MCNC: 196 MG/DL (ref 120–199)
CHOLEST/HDLC SERPL: 2.6 {RATIO} (ref 2–5)
CO2 SERPL-SCNC: 27 MMOL/L (ref 23–29)
CREAT SERPL-MCNC: 0.8 MG/DL (ref 0.5–1.4)
CRP SERPL-MCNC: 2.08 MG/L (ref 0–3.19)
EST. GFR  (NO RACE VARIABLE): >60 ML/MIN/1.73 M^2
ESTIMATED AVG GLUCOSE: 105 MG/DL (ref 68–131)
GLUCOSE SERPL-MCNC: 90 MG/DL (ref 70–110)
HBA1C MFR BLD: 5.3 % (ref 4–5.6)
HDLC SERPL-MCNC: 76 MG/DL (ref 40–75)
HDLC SERPL: 38.8 % (ref 20–50)
LDLC SERPL CALC-MCNC: 83 MG/DL (ref 63–159)
NONHDLC SERPL-MCNC: 120 MG/DL
OHS QRS DURATION: 84 MS
OHS QTC CALCULATION: 440 MS
POTASSIUM SERPL-SCNC: 4 MMOL/L (ref 3.5–5.1)
PROT SERPL-MCNC: 6.9 G/DL (ref 6–8.4)
SODIUM SERPL-SCNC: 142 MMOL/L (ref 136–145)
T4 FREE SERPL-MCNC: 0.92 NG/DL (ref 0.71–1.51)
TRIGL SERPL-MCNC: 185 MG/DL (ref 30–150)
TSH SERPL DL<=0.005 MIU/L-ACNC: 8.32 UIU/ML (ref 0.4–4)

## 2024-11-14 PROCEDURE — 80061 LIPID PANEL: CPT | Performed by: INTERNAL MEDICINE

## 2024-11-14 PROCEDURE — 84439 ASSAY OF FREE THYROXINE: CPT | Performed by: INTERNAL MEDICINE

## 2024-11-14 PROCEDURE — 93005 ELECTROCARDIOGRAM TRACING: CPT | Mod: S$GLB,,, | Performed by: INTERNAL MEDICINE

## 2024-11-14 PROCEDURE — 93010 ELECTROCARDIOGRAM REPORT: CPT | Mod: S$GLB,,, | Performed by: INTERNAL MEDICINE

## 2024-11-14 PROCEDURE — 83036 HEMOGLOBIN GLYCOSYLATED A1C: CPT | Performed by: INTERNAL MEDICINE

## 2024-11-14 PROCEDURE — 80053 COMPREHEN METABOLIC PANEL: CPT | Performed by: INTERNAL MEDICINE

## 2024-11-14 PROCEDURE — 86141 C-REACTIVE PROTEIN HS: CPT | Performed by: INTERNAL MEDICINE

## 2024-11-14 PROCEDURE — 99999 PR PBB SHADOW E&M-EST. PATIENT-LVL IV: CPT | Mod: PBBFAC,,, | Performed by: INTERNAL MEDICINE

## 2024-11-14 PROCEDURE — 83695 ASSAY OF LIPOPROTEIN(A): CPT | Performed by: INTERNAL MEDICINE

## 2024-11-14 PROCEDURE — 82306 VITAMIN D 25 HYDROXY: CPT | Performed by: INTERNAL MEDICINE

## 2024-11-14 PROCEDURE — 83880 ASSAY OF NATRIURETIC PEPTIDE: CPT | Performed by: INTERNAL MEDICINE

## 2024-11-14 PROCEDURE — 36415 COLL VENOUS BLD VENIPUNCTURE: CPT | Performed by: INTERNAL MEDICINE

## 2024-11-14 PROCEDURE — 84443 ASSAY THYROID STIM HORMONE: CPT | Performed by: INTERNAL MEDICINE

## 2024-11-14 NOTE — TELEPHONE ENCOUNTER
Called to confirm appt w/Dr Morrell 11/14 at 3:30 pm (EKG at 2:45) - No answer - Left 2 msgs on pt's VM.

## 2024-11-14 NOTE — PATIENT INSTRUCTIONS
Discussed diet , achieving and maintaining ideal body weight, and exercise.   We reviewed meds in detail.  Reassured-Discussed goals, options, plan.  Omega-3 > 800 mg/d combined EPA/DHA.  Goal BP< 130/80.  Goal LDL < 70.  Could get Lpa, HSCRP, HSCRP, BNP and repeat stress echo CFD  Best dose of the ADHD meds are as low as dose as needed to reduce symptoms and improve quality of life

## 2024-11-19 LAB — LPA SERPL-MCNC: 13 MG/DL (ref 0–30)

## 2024-11-19 NOTE — PROGRESS NOTES
Your results look fine and do not require any change in treatment.     Please contact me if you have any additional concerns.    Sincerely,    Rafal Morrell

## 2024-11-26 ENCOUNTER — TELEPHONE (OUTPATIENT)
Dept: CARDIOLOGY | Facility: CLINIC | Age: 69
End: 2024-11-26
Payer: COMMERCIAL

## 2024-11-26 NOTE — TELEPHONE ENCOUNTER
----- Message from Isabela sent at 2024  1:43 PM CST -----  Regarding: Nurse  Contact: Pt  Type: Requesting to speak with nurse    Who Called: PT  Regarding:    CV  STRESS ECHO W CFD [[id number]]  Would the patient rather a call back or a response via [facility]? Call back  Best Call Back Number: [phone]  Additional Information:  patient would like to speak w/ Nurse Della,... Please call, Thank You

## 2024-11-26 NOTE — TELEPHONE ENCOUNTER
Returned pt's call ( her DSE was cancelled) and informed her that , per Dept, Dobutamine is on back order right now so we cannot reschedule her .Stress Echo at this time. Pt verbalized understanding. We will call her once we know when  will be available.    Isabela Bello Staff  Caller: Pt (Today,  1:43 PM)  Type: Requesting to speak with nurse    Who Called: PT  Regarding:   CV  STRESS ECHO W CFD [[id number]]  Would the patient rather a call back or a response via [facility]? Call back  Best Call Back Number: [phone]  Additional Information:  patient would like to speak w/ Nurse Della,... Please call, Thank You

## 2025-01-27 NOTE — TELEPHONE ENCOUNTER
Spoke with pt states she wanted to let the  Know that on 8/19/19 she fell In her room and broke her leg  On 8/27/19 pt had surgery on her leg  On 9/1/19 pt was discharged from hospital    Pt states while in the hospital she had the heart monitor and states they will be sending the report from the monitor to dr. Cortez    Pt has appointment 10/4/19   None

## 2025-03-15 RX ORDER — IRBESARTAN 300 MG/1
300 TABLET ORAL DAILY
Qty: 90 TABLET | Refills: 3 | Status: SHIPPED | OUTPATIENT
Start: 2025-03-15 | End: 2026-03-15

## 2025-03-17 ENCOUNTER — TELEPHONE (OUTPATIENT)
Dept: CARDIOLOGY | Facility: CLINIC | Age: 70
End: 2025-03-17
Payer: COMMERCIAL

## 2025-03-17 NOTE — TELEPHONE ENCOUNTER
----- Message from JENISE Chris sent at 3/17/2025  9:28 AM CDT -----  Regarding: FW: refill    ----- Message -----  From: Denia Barnhart  Sent: 3/14/2025   4:25 PM CDT  To: Porsche GIRON Staff  Subject: refill                                           irbesartan (AVAPRO) 300 MG tablet ()[facility] [address] [facility] Phone: [phone]: [phone] youLOV 24

## 2025-03-24 ENCOUNTER — TELEPHONE (OUTPATIENT)
Dept: NEUROLOGY | Facility: CLINIC | Age: 70
End: 2025-03-24
Payer: COMMERCIAL

## 2025-03-24 NOTE — TELEPHONE ENCOUNTER
----- Message from Rebecca sent at 3/24/2025  3:37 PM CDT -----  Type:  Sooner Apoointment RequestCaller is requesting a sooner appointment.  Caller declined first available appointment listed below.  Caller will  accept being placed on the waitlist and is requesting a message be sent to doctor.Name of Caller:Pt SpouseWhen is the first available appointment?none in epicSymptoms:Follow up Dr. Hemphill [facility]# Would the patient rather a call back or a response via [facility]? callBest Call Back Number:[phone] Information:

## 2025-05-22 ENCOUNTER — PATIENT MESSAGE (OUTPATIENT)
Dept: CARDIOLOGY | Facility: CLINIC | Age: 70
End: 2025-05-22
Payer: COMMERCIAL

## 2025-05-22 ENCOUNTER — TELEPHONE (OUTPATIENT)
Dept: CARDIOLOGY | Facility: CLINIC | Age: 70
End: 2025-05-22
Payer: COMMERCIAL

## 2025-05-22 PROBLEM — M25.471 BILATERAL SWELLING OF FEET AND ANKLES: Status: ACTIVE | Noted: 2025-05-22

## 2025-05-22 PROBLEM — M25.473 ANKLE SWELLING: Status: ACTIVE | Noted: 2025-05-22

## 2025-05-22 PROBLEM — M25.475 BILATERAL SWELLING OF FEET AND ANKLES: Status: ACTIVE | Noted: 2025-05-22

## 2025-05-22 PROBLEM — M25.474 BILATERAL SWELLING OF FEET AND ANKLES: Status: ACTIVE | Noted: 2025-05-22

## 2025-05-22 PROBLEM — M25.472 BILATERAL SWELLING OF FEET AND ANKLES: Status: ACTIVE | Noted: 2025-05-22

## 2025-05-22 PROBLEM — R06.02 SHORTNESS OF BREATH: Status: ACTIVE | Noted: 2019-02-17

## 2025-05-22 NOTE — TELEPHONE ENCOUNTER
Per emailing w. dr Morrell, pt/  also c/o SOB and ankle swelling.  I was able to reach  and pt was scheduled in Am w. Ms Harris, told to arrive 30 mn early.  agreed to date/time of appointment(s) and verbalized understanding.

## 2025-05-22 NOTE — TELEPHONE ENCOUNTER
"----- Message from JENISE Chris sent at 5/22/2025 11:01 AM CDT -----  Regarding: RE: swollen ankles   called back but I was unavailable at the time. I called back x 2 numbers and left a voicemail.  ----- Message -----  From: Dot Chiu RN  Sent: 5/22/2025   9:53 AM CDT  To: Dot Chiu RN  Subject: swollen ankles                                   Called pt at 2 phone number(s), left voicemail, and sent a  " [facility]" message.  ----- Message -----  From: Dot Chiu RN  Sent: 5/22/2025   7:19 AM CDT  To: Dot Chiu RN  Subject: FW: appt                                           ----- Message -----  From: Denia Barnhart  Sent: 5/21/2025   1:22 PM CDT  To: Porsche GIRON Staff  Subject: appt                                             Pt is requesting to speak with you in the hopes of getting his wife an appt with Dr. Porsche LUNSFORD for ankle swelling.  He says he left a message last week but never received a call back.  I did tell him that Dr. Morrell was booked until August and offered him a PA but he refused.  He can be reached at [phone].Thank you  "

## 2025-05-22 NOTE — PROGRESS NOTES
General Cardiology Clinic Note  Last Clinic Visit: 11/14/24 with Dr. Morrell   General Cardiologist: Dr. Morrell    HPI:     Jorge Joshua is a 69 y.o. female who presents for SOB.    PROBLEM LIST:  CACS 318 (2019, age 63)  HTN  HLD    Interval HPI:   She presents today with concerns of shortness of breath. For the past few weeks or so she's felt more dyspneic with exertion, even in bending over tying her shoes. She is not very active at baseline due to an injured ankle. She was told by someone recently that she's been wheezing. She has some mild ankle swelling, but this has been intermittent for a while now, not new. Denies chest pain/pressure/tightness/discomfort, sustained palpitations, PND/orthopnea, lightheadedness or syncope. Weight is stable.   Additionally, her BP has been elevated. She has not been on chlorthalidone for some time now and cannot recall why. She does take amlodipine and irbesartan.       11/2024 HPI (Dr. Morrell)  The patient is here for CAD risk factors and high CAC. The patient has no chest pain, SOB, TIA, palpitations, syncope or pre-syncope. Patient does not exercise. BPs she says are good. Not on Chlorthalidone.    11/2020 HPI (Dr. Morrell)  The patient is here for CAD risk factors and high CAC and lipids now very bad again after being excellent 18 months ago. The patient has no chest pain, SOB, TIA, palpitations, syncope or pre-syncope. Patient does not exercise.    2/2019 HPI (Dr. Morrell)  She was supposed to see me years ago but did not-had CP in past coming now for LOPEZ-I have seen a number of family members. The patient has no chest pain TIA, palpitations, syncope or pre-syncope. Patient does not exercise much.     Surgical: Reviewed, as below.  Family: Reviewed, as below.   Social: Reviewed, as below.    ROS:    Pertinent ROS included in HPI and below.  PMH:     Past Medical History:   Diagnosis Date    Anxiety     Cancer     thyroid    Chronic low back pain     Depression     Dyspnea on  "exertion 2019    Heart murmur     Hyperlipidemia     Hypertension     Menorrhagia     OA (osteoarthritis) of knee     Obesity     Personal history of colonic polyps      Past Surgical History:   Procedure Laterality Date    breast reduction  1998     SECTION, LOW TRANSVERSE      HYSTERECTOMY  2012    ROTATOR CUFF REPAIR      TOTAL KNEE ARTHROPLASTY      left     Allergies:   Review of patient's allergies indicates:  No Known Allergies  Medications:   Medications Ordered Prior to Encounter[1]  Social History:     Social History     Tobacco Use    Smoking status: Former     Current packs/day: 0.00     Types: Cigarettes     Quit date: 2012     Years since quittin.8    Smokeless tobacco: Never   Substance Use Topics    Alcohol use: No     Family History:     Family History   Problem Relation Name Age of Onset    Hypertension Mother      Colon cancer Father          colon    Alzheimer's disease Father       Physical Exam:   BP (!) 148/86   Pulse 65   Ht 5' 2" (1.575 m)   Wt 79.8 kg (175 lb 14.8 oz)   SpO2 95%   BMI 32.18 kg/m²      Physical Exam  Constitutional:       Appearance: Normal appearance.   Cardiovascular:      Rate and Rhythm: Normal rate and regular rhythm.      Pulses:           Radial pulses are 2+ on the right side and 2+ on the left side.      Heart sounds: Normal heart sounds.   Pulmonary:      Effort: Pulmonary effort is normal.      Breath sounds: Normal breath sounds.   Musculoskeletal:      Right lower leg: No edema.      Left lower leg: Edema present.      Comments: Trace edema confined to ankle only   Skin:     General: Skin is warm and dry.   Neurological:      Mental Status: She is alert.          Labs:     Blood Tests:  Lab Results   Component Value Date    BNP 60 2024     2024    K 4.0 2024     2024    CO2 27 2024    BUN 16 2024    CREATININE 0.8 2024    GLU 90 2024    HGBA1C 5.3 2024    MG 1.7 " 06/21/2019    AST 20 11/14/2024    ALT 16 11/14/2024    ALBUMIN 3.7 11/14/2024    PROT 6.9 11/14/2024    BILITOT 0.8 11/14/2024    WBC 7.28 06/03/2023    HGB 12.2 06/03/2023    HCT 39.3 06/03/2023    MCV 93 06/03/2023     06/03/2023    INR 0.9 06/20/2019    TSH 8.321 (H) 11/14/2024    TSH 4.110 10/14/2019       Lab Results   Component Value Date    CHOL 196 11/14/2024    HDL 76 (H) 11/14/2024    TRIG 185 (H) 11/14/2024       Lab Results   Component Value Date    LDLCALC 83.0 11/14/2024       Lab Results   Component Value Date    TSH 8.321 (H) 11/14/2024       Lab Results   Component Value Date    HGBA1C 5.3 11/14/2024         Imaging:     Echocardiogram  TTE 6/2019  Normal left ventricular systolic function. The estimated ejection fraction is 65%  Concentric left ventricular remodeling.  Normal LV diastolic function.  No wall motion abnormalities.  Normal right ventricular systolic function.  Mild right atrial enlargement.  Mild tricuspid regurgitation.  Normal central venous pressure (3 mm Hg).  The estimated PA systolic pressure is 33 mm Hg    TTE 8/2017    1 - Normal left ventricular systolic function (EF 55-60%).     2 - Concentric hypertrophy.     3 - Impaired LV relaxation, elevated LAP (grade 2 diastolic dysfunction).     4 - Trivial aortic stenosis, CAMDEN = 2.36 cm2, mean gradient = 8 mmHg.     Stress testing  DSE 12/2020  The patient reached the end of the protocol.  During stress, the following significant arrhythmias were observed: rare PVCs.  The ECG portion of this study is negative for myocardial ischemia.  The left ventricle is normal in size with normal systolic function. The estimated ejection fraction is 63%.  There is left ventricular concentric remodeling.  Indeterminate diastolic function.  Normal right ventricular size with normal right ventricular systolic function.  Normal central venous pressure (3 mmHg).  The estimated PA systolic pressure is 22 mmHg.  The stress echo portion of this  study is negative for myocardial ischemia.    LM 2019  The patient reported no symptoms during the stress test.  The test was stopped secondary to fatigue.  Arrhythmias during stress: occasional PVCs.Couplet.  Overall, the patient's exercise capacity was moderately impaired.  There is 0.5 mm of depression in the leads.  The EKG portion of this study is negative for myocardial ischemia.  Left ventricular systolic function. The estimated ejection fraction is 63%  No wall motion abnormalities.  Normal right ventricular systolic function.  Mild mitral sclerosis.  Normal central venous pressure (3 mm Hg).  The stress echo portion of this study is positive for myocardial ischemia but the specificity is markedly reduced by the HTN response ( BP went to 230/95 early recovery.).    SPECT 2017  1. The perfusion scan is free of evidence for myocardial ischemia or injury.   2. There is a mild intensity fixed defect in the inferior wall of the left ventricle, secondary to diaphragm attenuation.   3. Resting wall motion is physiologic.   4. Resting LV function is normal.   5. The ventricular volumes are normal at rest and stress.   6. The extracardiac distribution of radioactivity is normal.     Cath Lab  None    Other  Carotid US 2019  Mild calcific plaque seen distal right common carotid, proximal internal and external carotid arteries. Less than 39% stenosis by velocities.  Moderate dense calcific plaque seen left internal carotid artery. Mild plaque seen left common carotid and external carotid arteries, all stenosis less than 39% stenosis by velocities.    CT Cardiac Scoring 3/2019  Agatston score 318, which corresponds to the greater than 90th percentile for age and gender.   The score corresponds with moderate plaque burden and high cardiovascular risk with moderate nonobstructive CAD highly likely.     EK24 - Normal sinus rhythm, 85 bpm   Left axis deviation   Minimal voltage criteria for LVH, may be  normal variant ( [place] product )   Septal infarct (cited on or before 01-Dec-2020)   T wave abnormality, consider lateral ischemia   Abnormal ECG     Assessment:     1. Agatston CAC score 200-399    2. Shortness of breath    3. Bilateral swelling of feet and ankles    4. Essential hypertension    5. Dyslipidemia        Plan:     Agatston CAC score 200-399  Shortness of breath  Bilateral swelling of feet and ankles  She appears euvolemic on exam, less suspicion for CHF.   Will check BNP today and obtain PET stress.    Essential hypertension  BP elevated.   Add back chlorthalidone and re-check BMP in 2 weeks. Continue amlodipine and irbesartan.   Encourage low-sodium diet.    Dyslipidemia  Continue Repatha.      Signed:  Lauren Vlosich, PA-C [facility]     5/23/2025     Follow-up:     Future Appointments   Date Time Provider Department Center   5/23/2025 11:00 AM LAB, APPOINTMENT [facility]              [1]   Current Outpatient Medications on File Prior to Visit   Medication Sig Dispense Refill    clonazePAM (KLONOPIN) 2 MG Tab Take 1 tablet (2 mg) by mouth every morning and 2 tabs (4 mg) every evening      dextroamphetamine-amphetamine 30 mg Tab Take 30 mg by mouth once daily. On workdays      ergocalciferol (ERGOCALCIFEROL) 50,000 unit Cap Take 50,000 Units by mouth every 7 days.       evolocumab (REPATHA SURECLICK) 140 mg/mL PnIj Inject 140 mg into the skin every 14 (fourteen) days.      levothyroxine (SYNTHROID) 112 MCG tablet       meclizine (ANTIVERT) 12.5 mg tablet Take 1 tablet (12.5 mg total) by mouth 3 (three) times daily as needed. 30 tablet 0    [DISCONTINUED] amLODIPine (NORVASC) 10 MG tablet Take 1 tablet (10 mg total) by mouth once daily. 90 tablet 3    [DISCONTINUED] irbesartan (AVAPRO) 300 MG tablet Take 1 tablet (300 mg total) by mouth once daily. stop losartan and start irbesartan 90 tablet 3    paroxetine (PAXIL-CR) 25 MG 24 hr tablet  (Patient not taking: Reported  on 5/23/2025)      RHEUMATE 1-1-500 mg Cap Take 1 capsule by mouth once daily. (Patient not taking: Reported on 5/23/2025)  12    [DISCONTINUED] atorvastatin (LIPITOR) 80 MG tablet Take 1 tablet (80 mg total) by mouth once daily. (Patient not taking: Reported on 5/23/2025) 90 tablet 3    [DISCONTINUED] chlorthalidone (HYGROTEN) 25 MG Tab Take 1 tablet (25 mg total) by mouth once daily. (Patient not taking: Reported on 5/23/2025) 90 tablet 1     No current facility-administered medications on file prior to visit.

## 2025-05-23 ENCOUNTER — LAB VISIT (OUTPATIENT)
Dept: LAB | Facility: HOSPITAL | Age: 70
End: 2025-05-23
Payer: COMMERCIAL

## 2025-05-23 ENCOUNTER — OFFICE VISIT (OUTPATIENT)
Dept: CARDIOLOGY | Facility: CLINIC | Age: 70
End: 2025-05-23
Payer: COMMERCIAL

## 2025-05-23 ENCOUNTER — RESULTS FOLLOW-UP (OUTPATIENT)
Dept: CARDIOLOGY | Facility: CLINIC | Age: 70
End: 2025-05-23

## 2025-05-23 VITALS
HEART RATE: 65 BPM | BODY MASS INDEX: 32.37 KG/M2 | OXYGEN SATURATION: 95 % | HEIGHT: 62 IN | WEIGHT: 175.94 LBS | DIASTOLIC BLOOD PRESSURE: 86 MMHG | SYSTOLIC BLOOD PRESSURE: 148 MMHG

## 2025-05-23 DIAGNOSIS — M25.472 BILATERAL SWELLING OF FEET AND ANKLES: ICD-10-CM

## 2025-05-23 DIAGNOSIS — R06.02 SHORTNESS OF BREATH: ICD-10-CM

## 2025-05-23 DIAGNOSIS — M25.471 BILATERAL SWELLING OF FEET AND ANKLES: ICD-10-CM

## 2025-05-23 DIAGNOSIS — I10 ESSENTIAL HYPERTENSION: Chronic | ICD-10-CM

## 2025-05-23 DIAGNOSIS — R93.1 AGATSTON CAC SCORE 200-399: Primary | ICD-10-CM

## 2025-05-23 DIAGNOSIS — R93.1 AGATSTON CAC SCORE 200-399: ICD-10-CM

## 2025-05-23 DIAGNOSIS — M25.474 BILATERAL SWELLING OF FEET AND ANKLES: ICD-10-CM

## 2025-05-23 DIAGNOSIS — M25.475 BILATERAL SWELLING OF FEET AND ANKLES: ICD-10-CM

## 2025-05-23 DIAGNOSIS — E78.5 DYSLIPIDEMIA: Chronic | ICD-10-CM

## 2025-05-23 LAB
ALBUMIN SERPL BCP-MCNC: 3.3 G/DL (ref 3.5–5.2)
ALP SERPL-CCNC: 112 UNIT/L (ref 40–150)
ALT SERPL W/O P-5'-P-CCNC: 15 UNIT/L (ref 10–44)
ANION GAP (OHS): 9 MMOL/L (ref 8–16)
AST SERPL-CCNC: 18 UNIT/L (ref 11–45)
BILIRUB SERPL-MCNC: 0.8 MG/DL (ref 0.1–1)
BNP SERPL-MCNC: 84 PG/ML (ref 0–99)
BUN SERPL-MCNC: 15 MG/DL (ref 8–23)
CALCIUM SERPL-MCNC: 9 MG/DL (ref 8.7–10.5)
CHLORIDE SERPL-SCNC: 103 MMOL/L (ref 95–110)
CO2 SERPL-SCNC: 30 MMOL/L (ref 23–29)
CREAT SERPL-MCNC: 0.6 MG/DL (ref 0.5–1.4)
GFR SERPLBLD CREATININE-BSD FMLA CKD-EPI: >60 ML/MIN/1.73/M2
GLUCOSE SERPL-MCNC: 83 MG/DL (ref 70–110)
POTASSIUM SERPL-SCNC: 4.6 MMOL/L (ref 3.5–5.1)
PROT SERPL-MCNC: 6.5 GM/DL (ref 6–8.4)
SODIUM SERPL-SCNC: 142 MMOL/L (ref 136–145)

## 2025-05-23 PROCEDURE — 99999 PR PBB SHADOW E&M-EST. PATIENT-LVL IV: CPT | Mod: PBBFAC,,,

## 2025-05-23 PROCEDURE — 36415 COLL VENOUS BLD VENIPUNCTURE: CPT

## 2025-05-23 PROCEDURE — 84075 ASSAY ALKALINE PHOSPHATASE: CPT

## 2025-05-23 PROCEDURE — 83880 ASSAY OF NATRIURETIC PEPTIDE: CPT

## 2025-05-23 RX ORDER — CHLORTHALIDONE 25 MG/1
25 TABLET ORAL DAILY
Qty: 90 TABLET | Refills: 3 | Status: SHIPPED | OUTPATIENT
Start: 2025-05-23

## 2025-05-23 RX ORDER — IRBESARTAN 300 MG/1
300 TABLET ORAL DAILY
Qty: 90 TABLET | Refills: 3 | Status: SHIPPED | OUTPATIENT
Start: 2025-05-23 | End: 2026-05-23

## 2025-05-23 RX ORDER — AMLODIPINE BESYLATE 10 MG/1
10 TABLET ORAL DAILY
Qty: 90 TABLET | Refills: 3 | Status: SHIPPED | OUTPATIENT
Start: 2025-05-23